# Patient Record
Sex: MALE | Race: WHITE | NOT HISPANIC OR LATINO | Employment: OTHER | URBAN - METROPOLITAN AREA
[De-identification: names, ages, dates, MRNs, and addresses within clinical notes are randomized per-mention and may not be internally consistent; named-entity substitution may affect disease eponyms.]

---

## 2017-01-17 DIAGNOSIS — F41.9 ANXIETY: ICD-10-CM

## 2017-01-17 RX ORDER — ALPRAZOLAM 0.5 MG/1
0.5 TABLET ORAL 3 TIMES DAILY PRN
Qty: 90 TABLET | Refills: 0 | Status: SHIPPED | OUTPATIENT
Start: 2017-01-17 | End: 2017-02-23 | Stop reason: SDUPTHER

## 2017-01-18 DIAGNOSIS — F51.02 TRANSIENT INSOMNIA: ICD-10-CM

## 2017-01-18 NOTE — TELEPHONE ENCOUNTER
Patient notified that his prescription for alprazolam is ready for pickup.  Verbalized understanding but states that he also needs a refill of zolpidem.  Informed that the request would be sent to Dr. Vega and he would be notified when it is ready for pickup.  Verbalized understanding.  LOV 11/9/2016.

## 2017-01-18 NOTE — TELEPHONE ENCOUNTER
----- Message from Ana Elmore sent at 1/18/2017  2:34 PM CST -----  Contact: self  [phone]  Pt is checking on the status on his refill request. Pls call pt [phone]. Thanks......Ortega

## 2017-01-19 RX ORDER — ZOLPIDEM TARTRATE 10 MG/1
10 TABLET ORAL NIGHTLY
Qty: 30 TABLET | Refills: 1 | Status: SHIPPED | OUTPATIENT
Start: 2017-01-19 | End: 2017-03-20 | Stop reason: SDUPTHER

## 2017-02-22 DIAGNOSIS — F41.9 ANXIETY: ICD-10-CM

## 2017-02-22 DIAGNOSIS — I10 ESSENTIAL HYPERTENSION: ICD-10-CM

## 2017-02-22 RX ORDER — METOPROLOL SUCCINATE 25 MG/1
TABLET, EXTENDED RELEASE ORAL
Qty: 90 TABLET | Refills: 1 | Status: SHIPPED | OUTPATIENT
Start: 2017-02-22 | End: 2017-06-18 | Stop reason: SDUPTHER

## 2017-02-22 RX ORDER — AMLODIPINE BESYLATE 10 MG/1
TABLET ORAL
Qty: 90 TABLET | Refills: 1 | Status: SHIPPED | OUTPATIENT
Start: 2017-02-22 | End: 2017-03-20 | Stop reason: SDUPTHER

## 2017-02-22 RX ORDER — LISINOPRIL 40 MG/1
TABLET ORAL
Qty: 90 TABLET | Refills: 1 | Status: SHIPPED | OUTPATIENT
Start: 2017-02-22 | End: 2017-03-20 | Stop reason: SDUPTHER

## 2017-02-23 RX ORDER — ALPRAZOLAM 0.5 MG/1
0.5 TABLET ORAL 3 TIMES DAILY PRN
Qty: 90 TABLET | Refills: 0 | Status: SHIPPED | OUTPATIENT
Start: 2017-02-23 | End: 2017-03-20 | Stop reason: SDUPTHER

## 2017-02-23 NOTE — TELEPHONE ENCOUNTER
----- Message from Marcus Coker sent at 2/23/2017  2:31 PM CST -----  Contact: self  Refill:    alprazolam (XANAX) 0.5 MG tablet    Thanks

## 2017-03-07 ENCOUNTER — TELEPHONE (OUTPATIENT)
Dept: FAMILY MEDICINE | Facility: CLINIC | Age: 62
End: 2017-03-07

## 2017-03-07 NOTE — TELEPHONE ENCOUNTER
----- Message from Fadia Awad sent at 3/6/2017  2:14 PM CST -----  Contact: [phone]  Pt is wanting to know if his refill for alprazolam (XANAX) 0.5 MG tablet is ready for pickup Please call pt at your earliest convenience.  Thanks !

## 2017-03-07 NOTE — TELEPHONE ENCOUNTER
----- Message from Fadia Awad sent at 3/6/2017  2:14 PM CST -----  Contact: [phone]  Pt is wanting to know if his refill for alprazolam (XANAX) 0.5 MG tablet is ready for pickup Please call pt at your earliest convenience.  Thanks !

## 2017-03-08 NOTE — TELEPHONE ENCOUNTER
----- Message from Qamar Landry sent at 3/1/2017  1:27 PM CST -----  Contact: Self  Pt called to check status of refill for alprazolam (XANAX) 0.5 MG tablet. Pt can be reached @ [phone].

## 2017-03-20 DIAGNOSIS — M54.5 CHRONIC MIDLINE LOW BACK PAIN, WITH SCIATICA PRESENCE UNSPECIFIED: ICD-10-CM

## 2017-03-20 DIAGNOSIS — F51.02 TRANSIENT INSOMNIA: ICD-10-CM

## 2017-03-20 DIAGNOSIS — F41.9 ANXIETY: ICD-10-CM

## 2017-03-20 DIAGNOSIS — G89.29 CHRONIC MIDLINE LOW BACK PAIN, WITH SCIATICA PRESENCE UNSPECIFIED: ICD-10-CM

## 2017-03-20 DIAGNOSIS — I10 ESSENTIAL HYPERTENSION: ICD-10-CM

## 2017-03-20 RX ORDER — METOPROLOL SUCCINATE 25 MG/1
25 TABLET, EXTENDED RELEASE ORAL DAILY
Qty: 90 TABLET | Refills: 0 | Status: SHIPPED | OUTPATIENT
Start: 2017-03-20 | End: 2017-05-23 | Stop reason: SDUPTHER

## 2017-03-20 RX ORDER — ALPRAZOLAM 0.5 MG/1
0.5 TABLET ORAL 3 TIMES DAILY PRN
Qty: 90 TABLET | Refills: 0 | Status: SHIPPED | OUTPATIENT
Start: 2017-03-20 | End: 2017-05-23 | Stop reason: SDUPTHER

## 2017-03-20 RX ORDER — NAPROXEN 500 MG/1
500 TABLET ORAL 2 TIMES DAILY WITH MEALS
Qty: 60 TABLET | Refills: 0 | Status: SHIPPED | OUTPATIENT
Start: 2017-03-20 | End: 2017-04-18 | Stop reason: SDUPTHER

## 2017-03-20 RX ORDER — AMLODIPINE BESYLATE 10 MG/1
10 TABLET ORAL DAILY
Qty: 90 TABLET | Refills: 1 | Status: SHIPPED | OUTPATIENT
Start: 2017-03-20 | End: 2017-06-18 | Stop reason: SDUPTHER

## 2017-03-20 RX ORDER — ZOLPIDEM TARTRATE 10 MG/1
10 TABLET ORAL NIGHTLY
Qty: 30 TABLET | Refills: 1 | Status: SHIPPED | OUTPATIENT
Start: 2017-03-20 | End: 2017-05-23 | Stop reason: SDUPTHER

## 2017-03-20 RX ORDER — LISINOPRIL 40 MG/1
40 TABLET ORAL DAILY
Qty: 90 TABLET | Refills: 1 | Status: SHIPPED | OUTPATIENT
Start: 2017-03-20 | End: 2017-06-18 | Stop reason: SDUPTHER

## 2017-03-29 ENCOUNTER — TELEPHONE (OUTPATIENT)
Dept: FAMILY MEDICINE | Facility: CLINIC | Age: 62
End: 2017-03-29

## 2017-03-29 NOTE — TELEPHONE ENCOUNTER
Alvina @ [facility] pharmacy requests refill authorization for patient's alprazolam, metoprolol, and zolpidem prescriptions.  Authorization provided to Edgardo, pharmacist, per prescriptions written by Dr. Vega on 3/20/2017.

## 2017-04-03 NOTE — TELEPHONE ENCOUNTER
Patient inquiring about his metoprolol and zolpidem prescriptions.  Informed that his prescriptions were called in to [facility] pharmacy on 3/29/2017 as stated in message below and advised to contact [facility] for any additional information on shipping status.  Verbalized understanding and states that he will give them several more days to process the order.

## 2017-04-18 DIAGNOSIS — G89.29 CHRONIC MIDLINE LOW BACK PAIN, WITH SCIATICA PRESENCE UNSPECIFIED: ICD-10-CM

## 2017-04-18 DIAGNOSIS — M54.5 CHRONIC MIDLINE LOW BACK PAIN, WITH SCIATICA PRESENCE UNSPECIFIED: ICD-10-CM

## 2017-04-18 RX ORDER — NAPROXEN 500 MG/1
TABLET ORAL
Qty: 60 TABLET | Refills: 0 | Status: SHIPPED | OUTPATIENT
Start: 2017-04-18 | End: 2017-10-30 | Stop reason: SDUPTHER

## 2017-05-13 DIAGNOSIS — F41.9 ANXIETY: ICD-10-CM

## 2017-05-13 DIAGNOSIS — F51.02 TRANSIENT INSOMNIA: ICD-10-CM

## 2017-05-13 NOTE — TELEPHONE ENCOUNTER
----- Message from Fadia Awad sent at 5/13/2017  8:43 AM CDT -----  Contact: [phone]  Pt is requesting refills on alprazolam (XANAX) 0.5 MG tablet and zolpidem (AMBIEN) 10 mg , Tab please send to [facility] - [place], [place] - [address]

## 2017-05-14 RX ORDER — ZOLPIDEM TARTRATE 10 MG/1
10 TABLET ORAL NIGHTLY
Qty: 30 TABLET | Refills: 1 | OUTPATIENT
Start: 2017-05-14

## 2017-05-14 RX ORDER — ALPRAZOLAM 0.5 MG/1
0.5 TABLET ORAL 3 TIMES DAILY PRN
Qty: 90 TABLET | Refills: 0 | OUTPATIENT
Start: 2017-05-14

## 2017-05-23 ENCOUNTER — OFFICE VISIT (OUTPATIENT)
Dept: FAMILY MEDICINE | Facility: CLINIC | Age: 62
End: 2017-05-23
Payer: MEDICARE

## 2017-05-23 VITALS
TEMPERATURE: 98 F | OXYGEN SATURATION: 95 % | BODY MASS INDEX: 36.8 KG/M2 | DIASTOLIC BLOOD PRESSURE: 70 MMHG | WEIGHT: 248.44 LBS | HEIGHT: 69 IN | SYSTOLIC BLOOD PRESSURE: 120 MMHG | HEART RATE: 63 BPM

## 2017-05-23 DIAGNOSIS — G89.29 CHRONIC MIDLINE LOW BACK PAIN WITHOUT SCIATICA: ICD-10-CM

## 2017-05-23 DIAGNOSIS — E66.01 SEVERE OBESITY WITH BODY MASS INDEX 36.0-36.9: ICD-10-CM

## 2017-05-23 DIAGNOSIS — E11.9 CONTROLLED TYPE 2 DIABETES MELLITUS WITHOUT COMPLICATION, WITHOUT LONG-TERM CURRENT USE OF INSULIN: ICD-10-CM

## 2017-05-23 DIAGNOSIS — F51.02 TRANSIENT INSOMNIA: ICD-10-CM

## 2017-05-23 DIAGNOSIS — M54.50 CHRONIC MIDLINE LOW BACK PAIN WITHOUT SCIATICA: ICD-10-CM

## 2017-05-23 DIAGNOSIS — I50.9 CHRONIC CONGESTIVE HEART FAILURE, UNSPECIFIED CONGESTIVE HEART FAILURE TYPE: Primary | ICD-10-CM

## 2017-05-23 DIAGNOSIS — F41.9 ANXIETY: ICD-10-CM

## 2017-05-23 PROCEDURE — 3045F PR MOST RECENT HEMOGLOBIN A1C LEVEL 7.0-9.0%: CPT | Mod: S$GLB,,, | Performed by: FAMILY MEDICINE

## 2017-05-23 PROCEDURE — 4010F ACE/ARB THERAPY RXD/TAKEN: CPT | Mod: S$GLB,,, | Performed by: FAMILY MEDICINE

## 2017-05-23 PROCEDURE — 3074F SYST BP LT 130 MM HG: CPT | Mod: S$GLB,,, | Performed by: FAMILY MEDICINE

## 2017-05-23 PROCEDURE — 99999 PR PBB SHADOW E&M-EST. PATIENT-LVL III: CPT | Mod: PBBFAC,,, | Performed by: FAMILY MEDICINE

## 2017-05-23 PROCEDURE — 1160F RVW MEDS BY RX/DR IN RCRD: CPT | Mod: S$GLB,,, | Performed by: FAMILY MEDICINE

## 2017-05-23 PROCEDURE — 3078F DIAST BP <80 MM HG: CPT | Mod: S$GLB,,, | Performed by: FAMILY MEDICINE

## 2017-05-23 PROCEDURE — 99214 OFFICE O/P EST MOD 30 MIN: CPT | Mod: S$GLB,,, | Performed by: FAMILY MEDICINE

## 2017-05-23 PROCEDURE — 99499 UNLISTED E&M SERVICE: CPT | Mod: HCNC,S$GLB,, | Performed by: FAMILY MEDICINE

## 2017-05-23 RX ORDER — ZOLPIDEM TARTRATE 10 MG/1
10 TABLET ORAL NIGHTLY
Qty: 30 TABLET | Refills: 2 | Status: SHIPPED | OUTPATIENT
Start: 2017-05-23 | End: 2017-08-30 | Stop reason: SDUPTHER

## 2017-05-23 RX ORDER — ALPRAZOLAM 0.5 MG/1
0.5 TABLET ORAL 3 TIMES DAILY PRN
Qty: 90 TABLET | Refills: 0 | Status: SHIPPED | OUTPATIENT
Start: 2017-05-23 | End: 2017-05-23 | Stop reason: SDUPTHER

## 2017-05-23 RX ORDER — ALPRAZOLAM 0.5 MG/1
0.5 TABLET ORAL 4 TIMES DAILY PRN
Qty: 120 TABLET | Refills: 0 | Status: SHIPPED | OUTPATIENT
Start: 2017-05-23 | End: 2017-06-18 | Stop reason: SDUPTHER

## 2017-05-23 NOTE — PROGRESS NOTES
Office Visit    Patient Name: Eyad Rodriguez    : 1955  MRN: [id number]    Subjective:  Eyad is a 62 y.o. male who presents today for:    Medication Refill      This patient has multiple medical diagnoses as noted below.  This patient is known to me and to this clinic. He presents to clinic for refills on his medication.  Patient denies any new symptoms including chest pain, SOB, blurry vision, N/V, diarrhea.  He would like a refill on his medications.  He states that he is doing well.  His last hemoglobin A1C shows that he is well controlled without medications.          Active Problem List  Patient Active Problem List   Diagnosis    BPH (benign prostatic hypertrophy)    DJD (degenerative joint disease) of knee    Insomnia    Anxiety    Chronic low back pain    Severe obesity with body mass index 36.0-36.9    Tobacco abuse    Essential hypertension    Angina at rest    Chest pain, atypical    CHF (congestive heart failure)    Cardiomyopathy    Controlled type 2 diabetes mellitus without complication, without long-term current use of insulin       Past Surgical History  Past Surgical History:   Procedure Laterality Date    cyst removed from back         Family History  Family History   Problem Relation Age of Onset    Hypertension Mother     Anxiety disorder Mother     Diverticulosis Mother     Irritable bowel syndrome Mother     Hypertension Father     Anxiety disorder Father     Anxiety disorder Sister     Colon polyps Brother     Colon cancer Neg Hx     Cirrhosis Neg Hx     Liver cancer Neg Hx     Celiac disease Neg Hx     Crohn's disease Neg Hx     Ulcerative colitis Neg Hx     Esophageal cancer Neg Hx     Stomach cancer Neg Hx     Rectal cancer Neg Hx        Social History  Social History     Social History    Marital status:      Spouse name: N/A    Number of children: 1    Years of education: N/A     Occupational History          Social History  "Main Topics    Smoking status: Former Smoker     Types: Cigars    Smokeless tobacco: Not on file      Comment: quit 10 yrs ago    Alcohol use No    Drug use: No    Sexual activity: Not Currently     Other Topics Concern    Not on file     Social History Narrative    No narrative on file       Current Medications  Medications reviewed and updated.     Allergies   Review of patient's allergies indicates:   Allergen Reactions    Flexeril  [cyclobenzaprine]      Other reaction(s): Nausea       Review of Systems (Pertinent positives)  Review of Systems   Constitutional: Negative for activity change, appetite change, fatigue, fever and unexpected weight change.   HENT: Negative for facial swelling.    Eyes: Negative for visual disturbance.   Respiratory: Negative for chest tightness, shortness of breath, wheezing and stridor.    Cardiovascular: Negative for chest pain, palpitations and leg swelling.   Gastrointestinal: Negative for abdominal distention, abdominal pain, blood in stool, constipation, diarrhea, nausea and vomiting.   Endocrine: Negative for cold intolerance, heat intolerance, polydipsia and polyuria.   Genitourinary: Negative.  Negative for testicular pain and urgency.   Musculoskeletal: Positive for back pain and gait problem.   Skin: Negative.    Allergic/Immunologic: Negative.    Neurological: Negative for dizziness, weakness, light-headedness, numbness and headaches.   Psychiatric/Behavioral: Positive for agitation and sleep disturbance. Negative for decreased concentration. The patient is nervous/anxious.        /70 (BP Location: Left arm, Patient Position: Sitting, BP Method: Manual)   Pulse 63   Temp 98.1 °F (36.7 °C) (Oral)   Ht 5' 9" (1.753 m)   Wt 112.7 kg (248 lb 7.3 oz)   SpO2 95%   BMI 36.69 kg/m²     Physical Exam   Constitutional: He is oriented to person, place, and time. He appears well-developed and well-nourished.   HENT:   Head: Normocephalic and atraumatic.   Eyes: " Conjunctivae and EOM are normal. Pupils are equal, round, and reactive to light.   Neck: Normal range of motion. Neck supple. No JVD present. No thyromegaly present.   Cardiovascular: Normal rate, regular rhythm and normal heart sounds.    Pulmonary/Chest: Effort normal and breath sounds normal.   Abdominal: Soft. Bowel sounds are normal. There is no guarding.   Diastasis recti   Musculoskeletal: Normal range of motion.   Lymphadenopathy:     He has no cervical adenopathy.   Neurological: He is alert and oriented to person, place, and time.   Skin: Skin is warm and dry.   Psychiatric: He has a normal mood and affect. His behavior is normal.       Health Maintenance  Health Maintenance Topics with due status: Not Due       Topic Last Completion Date    Pneumococcal PPSV23 (Medium Risk) 10/08/2015    Lipid Panel 11/09/2016    Influenza Vaccine Not Due       Assessment/Plan:  Eyad Rodriguez is a 62 y.o. male who presents today for :    Eyad was seen today for medication refill.    Diagnoses and all orders for this visit:    Chronic congestive heart failure, unspecified congestive heart failure type  -  No increase in weight   -  Pt is currently stable on medication regimen.  Continue current therapy as scheduled.  Contact office with any questions about adjustments on medications.   -  Follow up with cardiology   Anxiety  -     Discontinue: alprazolam (XANAX) 0.5 MG tablet; Take 1 tablet (0.5 mg total) by mouth 3 (three) times daily as needed for Anxiety.  -     alprazolam (XANAX) 0.5 MG tablet; Take 1 tablet (0.5 mg total) by mouth 4 (four) times daily as needed for Anxiety.  -  Padmini roldan in hospice temporary increase in alprazolam because of increase stress related to caring for her.     Transient insomnia  -     zolpidem (AMBIEN) 10 mg Tab; Take 1 tablet (10 mg total) by mouth nightly.  -  Pt is currently stable on medication regimen.  Continue current therapy as scheduled.  Contact office with any questions  about adjustments on medications.   -  We discussed sleep hygiene including going to bed at the same time every night, having a bedtime routine, avoiding bright lights in the 2-3 hours before bedtime, making sure the bedroom is completely dark when going to bed and not watching TV or reading in bed (preferably no tv in the bedroom), Consider consultation with a sleep specialist if symptoms worsen or persist.    Controlled type 2 diabetes mellitus without complication, without long-term current use of insulin  -   Patient is stable.  Assess and addressed all modifiable risk factors.  Continue with appropriate management to prevent complications.      Chronic midline low back pain without sciatica  -  Continue with pain management     Severe obesity with body mass index 36.0-36.9  -  The patient is asked to make an attempt to improve diet and exercise patterns to aid in medical management of this problem.          No Follow-up on file.

## 2017-05-26 DIAGNOSIS — E11.9 TYPE 2 DIABETES MELLITUS WITHOUT COMPLICATION: ICD-10-CM

## 2017-06-18 DIAGNOSIS — I10 ESSENTIAL HYPERTENSION: ICD-10-CM

## 2017-06-18 DIAGNOSIS — F41.9 ANXIETY: ICD-10-CM

## 2017-06-18 NOTE — TELEPHONE ENCOUNTER
----- Message from Elodia Oden sent at 6/16/2017  4:19 PM CDT -----  Contact: Self   Patient need a refill. Please call patient at [phone]    alprazolam (XANAX) 0.5 MG tablet  lisinopril (PRINIVIL,ZESTRIL) 40 MG tablet  metoprolol succinate (TOPROL-XL) 25 MG 24 hr tablet  amlodipine (NORVASC) 10 MG tablet

## 2017-06-19 RX ORDER — ALPRAZOLAM 0.5 MG/1
0.5 TABLET ORAL 4 TIMES DAILY PRN
Qty: 120 TABLET | Refills: 0 | Status: SHIPPED | OUTPATIENT
Start: 2017-06-19 | End: 2017-07-22 | Stop reason: SDUPTHER

## 2017-06-19 RX ORDER — METOPROLOL SUCCINATE 25 MG/1
25 TABLET, EXTENDED RELEASE ORAL DAILY
Qty: 90 TABLET | Refills: 1 | Status: SHIPPED | OUTPATIENT
Start: 2017-06-19 | End: 2018-01-15 | Stop reason: SDUPTHER

## 2017-06-19 RX ORDER — LISINOPRIL 40 MG/1
40 TABLET ORAL DAILY
Qty: 90 TABLET | Refills: 1 | Status: SHIPPED | OUTPATIENT
Start: 2017-06-19 | End: 2018-01-15 | Stop reason: SDUPTHER

## 2017-06-19 RX ORDER — AMLODIPINE BESYLATE 10 MG/1
10 TABLET ORAL DAILY
Qty: 90 TABLET | Refills: 1 | Status: SHIPPED | OUTPATIENT
Start: 2017-06-19 | End: 2018-01-15 | Stop reason: SDUPTHER

## 2017-07-22 DIAGNOSIS — F41.9 ANXIETY: ICD-10-CM

## 2017-07-24 RX ORDER — ALPRAZOLAM 0.5 MG/1
0.5 TABLET ORAL 4 TIMES DAILY PRN
Qty: 90 TABLET | Refills: 0 | Status: SHIPPED | OUTPATIENT
Start: 2017-07-24 | End: 2017-08-30 | Stop reason: SDUPTHER

## 2017-08-30 DIAGNOSIS — F51.02 TRANSIENT INSOMNIA: ICD-10-CM

## 2017-08-30 DIAGNOSIS — F41.9 ANXIETY: ICD-10-CM

## 2017-08-31 RX ORDER — ALPRAZOLAM 0.5 MG/1
0.5 TABLET ORAL 3 TIMES DAILY PRN
Qty: 90 TABLET | Refills: 0 | Status: SHIPPED | OUTPATIENT
Start: 2017-08-31 | End: 2017-10-09 | Stop reason: SDUPTHER

## 2017-08-31 RX ORDER — ZOLPIDEM TARTRATE 10 MG/1
10 TABLET ORAL NIGHTLY
Qty: 30 TABLET | Refills: 2 | Status: SHIPPED | OUTPATIENT
Start: 2017-08-31 | End: 2017-12-15 | Stop reason: SDUPTHER

## 2017-10-09 DIAGNOSIS — F41.9 ANXIETY: ICD-10-CM

## 2017-10-09 DIAGNOSIS — I10 ESSENTIAL HYPERTENSION: ICD-10-CM

## 2017-10-09 RX ORDER — ALPRAZOLAM 0.5 MG/1
0.5 TABLET ORAL 3 TIMES DAILY PRN
Qty: 90 TABLET | Refills: 0 | Status: SHIPPED | OUTPATIENT
Start: 2017-10-09 | End: 2017-11-10 | Stop reason: SDUPTHER

## 2017-10-09 RX ORDER — METOPROLOL SUCCINATE 25 MG/1
25 TABLET, EXTENDED RELEASE ORAL DAILY
Qty: 90 TABLET | Refills: 1 | Status: CANCELLED | OUTPATIENT
Start: 2017-10-09

## 2017-10-30 ENCOUNTER — OFFICE VISIT (OUTPATIENT)
Dept: FAMILY MEDICINE | Facility: CLINIC | Age: 62
End: 2017-10-30
Payer: MEDICARE

## 2017-10-30 VITALS
BODY MASS INDEX: 36.9 KG/M2 | DIASTOLIC BLOOD PRESSURE: 76 MMHG | HEART RATE: 53 BPM | TEMPERATURE: 98 F | HEIGHT: 69 IN | WEIGHT: 249.13 LBS | SYSTOLIC BLOOD PRESSURE: 100 MMHG | OXYGEN SATURATION: 94 %

## 2017-10-30 DIAGNOSIS — G89.29 CHRONIC MIDLINE LOW BACK PAIN, WITH SCIATICA PRESENCE UNSPECIFIED: ICD-10-CM

## 2017-10-30 DIAGNOSIS — M54.5 CHRONIC MIDLINE LOW BACK PAIN, WITH SCIATICA PRESENCE UNSPECIFIED: ICD-10-CM

## 2017-10-30 DIAGNOSIS — M23.50 CHRONIC INSTABILITY OF KNEE, UNSPECIFIED LATERALITY: ICD-10-CM

## 2017-10-30 DIAGNOSIS — N52.1 ERECTILE DYSFUNCTION DUE TO DISEASES CLASSIFIED ELSEWHERE: Primary | ICD-10-CM

## 2017-10-30 PROCEDURE — 99999 PR PBB SHADOW E&M-EST. PATIENT-LVL III: CPT | Mod: PBBFAC,,, | Performed by: FAMILY MEDICINE

## 2017-10-30 PROCEDURE — 99499 UNLISTED E&M SERVICE: CPT | Mod: S$GLB,,, | Performed by: FAMILY MEDICINE

## 2017-10-30 PROCEDURE — 99214 OFFICE O/P EST MOD 30 MIN: CPT | Mod: S$GLB,,, | Performed by: FAMILY MEDICINE

## 2017-10-30 RX ORDER — HYDROCODONE BITARTRATE AND ACETAMINOPHEN 7.5; 325 MG/1; MG/1
1 TABLET ORAL EVERY 6 HOURS PRN
Qty: 30 TABLET | Refills: 0 | Status: SHIPPED | OUTPATIENT
Start: 2017-10-30 | End: 2018-04-10 | Stop reason: SDUPTHER

## 2017-10-30 RX ORDER — NAPROXEN 500 MG/1
500 TABLET ORAL 2 TIMES DAILY WITH MEALS
Qty: 60 TABLET | Refills: 0 | Status: SHIPPED | OUTPATIENT
Start: 2017-10-30 | End: 2018-01-15 | Stop reason: SDUPTHER

## 2017-10-30 NOTE — PROGRESS NOTES
Office Visit    Patient Name: Eyad Rodriguez    : 1955  MRN: [id number]    Subjective:  Eyad is a 62 y.o. male who presents today for:    Diabetes; Hypertension; and Medication Refill      This patient has multiple medical diagnoses as noted below.  This patient is known to me and to this clinic. He reports some abdominal discomfort.  He has it mainly at night.  He will have a bowel moment, but will have some associated cramping.  He reports that he is having some ED.  He has been on cialis and viagra but has not worked in the past.  Hematoma in scrotal region caused permanent ED. He noted that he has increased intense pain at night.  He would like medication to use periodically for the pain.  He has been following the [employer] diet.  He states that he would like to lose weight to help with his hernia in place.       Patient Active Problem List   Diagnosis    BPH (benign prostatic hypertrophy)    DJD (degenerative joint disease) of knee    Insomnia    Anxiety    Chronic low back pain    Severe obesity with body mass index 36.0-36.9    Tobacco abuse    Essential hypertension    Angina at rest    Chest pain, atypical    CHF (congestive heart failure)    Cardiomyopathy    Controlled type 2 diabetes mellitus without complication, without long-term current use of insulin       Past Surgical History:   Procedure Laterality Date    cyst removed from back         Family History   Problem Relation Age of Onset    Hypertension Mother     Anxiety disorder Mother     Diverticulosis Mother     Irritable bowel syndrome Mother     Hypertension Father     Anxiety disorder Father     Anxiety disorder Sister     Colon polyps Brother     Colon cancer Neg Hx     Cirrhosis Neg Hx     Liver cancer Neg Hx     Celiac disease Neg Hx     Crohn's disease Neg Hx     Ulcerative colitis Neg Hx     Esophageal cancer Neg Hx     Stomach cancer Neg Hx     Rectal cancer Neg Hx        Social History     Social  History    Marital status:      Spouse name: N/A    Number of children: 1    Years of education: N/A     Occupational History          Social History Main Topics    Smoking status: Former Smoker     Types: Cigars    Smokeless tobacco: Not on file      Comment: quit 10 yrs ago    Alcohol use No    Drug use: No    Sexual activity: Not Currently     Other Topics Concern    Not on file     Social History Narrative    No narrative on file       Current Medications  Medications reviewed and updated.     Allergies   Review of patient's allergies indicates:   Allergen Reactions    Flexeril  [cyclobenzaprine]      Other reaction(s): Nausea         Labs  Lab Results   Component Value Date    HGBA1C 7.0 (H) 11/09/2016     Lab Results   Component Value Date     08/13/2015    K 4.2 08/13/2015     08/13/2015    CO2 29 08/13/2015    BUN 13 08/13/2015    CREATININE 1.1 08/13/2015    CALCIUM 9.6 08/13/2015    ANIONGAP 9 08/13/2015    ESTGFRAFRICA >60.0 08/13/2015    EGFRNONAA >60.0 08/13/2015     Lab Results   Component Value Date    CHOL 208 (H) 11/09/2016    CHOL 193 04/13/2013     Lab Results   Component Value Date    HDL 37 (L) 11/09/2016    HDL 39 (L) 04/13/2013     Lab Results   Component Value Date    LDLCALC 139.2 11/09/2016    LDLCALC 132.0 04/13/2013     Lab Results   Component Value Date    TRIG 159 (H) 11/09/2016    TRIG 111 04/13/2013     Lab Results   Component Value Date    CHOLHDL 17.8 (L) 11/09/2016    CHOLHDL 20.2 04/13/2013     Last set of blood work has been reviewed as noted above.    Review of Systems   Constitutional: Negative for activity change, appetite change, fatigue, fever and unexpected weight change.   HENT: Negative for facial swelling.    Eyes: Negative for visual disturbance.   Respiratory: Negative for chest tightness, shortness of breath, wheezing and stridor.    Cardiovascular: Negative for chest pain, palpitations and leg swelling.   Gastrointestinal:  "Negative for abdominal distention, abdominal pain, blood in stool, constipation, diarrhea, nausea and vomiting.   Endocrine: Negative for cold intolerance, heat intolerance, polydipsia and polyuria.   Genitourinary: Negative.  Negative for testicular pain and urgency.        ED   Skin: Negative.    Allergic/Immunologic: Negative.    Neurological: Negative for dizziness, weakness, light-headedness, numbness and headaches.   Psychiatric/Behavioral: Negative for agitation and decreased concentration.       /76 (BP Location: Left arm, Patient Position: Sitting, BP Method: X-Large (Manual))   Pulse (!) 53   Temp 97.9 °F (36.6 °C) (Oral)   Ht 5' 9" (1.753 m)   Wt 113 kg (249 lb 1.9 oz)   SpO2 (!) 94%   BMI 36.79 kg/m²      Physical Exam   Constitutional: He is oriented to person, place, and time. He appears well-developed and well-nourished.   HENT:   Head: Normocephalic and atraumatic.   Eyes: Conjunctivae and EOM are normal. Pupils are equal, round, and reactive to light.   Neck: Normal range of motion. Neck supple. No JVD present. No thyromegaly present.   Cardiovascular: Normal rate, regular rhythm and normal heart sounds.    Pulmonary/Chest: Effort normal and breath sounds normal.   Abdominal: Soft. Bowel sounds are normal. He exhibits no distension. There is no tenderness. There is no guarding.   Diastasis recti   Musculoskeletal: Normal range of motion.   Lymphadenopathy:     He has no cervical adenopathy.   Neurological: He is alert and oriented to person, place, and time.   Skin: Skin is warm and dry.   Psychiatric: He has a normal mood and affect. His behavior is normal.       Health Maintenance  Health Maintenance       Date Due Completion Date    Foot Exam 05/18/1967 ---    Eye Exam 05/18/1967 ---    TETANUS VACCINE 05/18/1975 ---    Colonoscopy 05/18/2007 ---    Hemoglobin A1c 05/09/2017 11/9/2016    Zoster Vaccine 05/18/2017 ---    Influenza Vaccine 08/01/2017 ---    Lipid Panel 11/09/2017 " 11/9/2016    Pneumococcal PPSV23 (Medium Risk) (2) 05/18/2022 10/8/2015          Assessment/Plan:  Eyad Rodriguez is a 62 y.o. male who presents today for :    1. Erectile dysfunction due to diseases classified elsewhere    2. Chronic midline low back pain, with sciatica presence unspecified    3. Chronic instability of knee, unspecified laterality        Problem List Items Addressed This Visit        Unprioritized    Chronic low back pain    Overview     followed by pain management         Relevant Medications    naproxen (NAPROSYN) 500 MG tablet    hydrocodone-acetaminophen 7.5-325mg (NORCO) 7.5-325 mg per tablet      Other Visit Diagnoses     Erectile dysfunction due to diseases classified elsewhere    -  Primary    Relevant Medications    alprostadil (MUSE) 125 MCG pellet    Chronic instability of knee, unspecified laterality      -  contiue with pain management             Return in about 6 months (around 4/30/2018).     This note was created by combination of typed  and Dragon dictation.  Transcription errors may be present.  If there are any questions, please contact me.

## 2017-11-10 DIAGNOSIS — F41.9 ANXIETY: ICD-10-CM

## 2017-11-10 RX ORDER — ALPRAZOLAM 0.5 MG/1
0.5 TABLET ORAL 3 TIMES DAILY PRN
Qty: 15 TABLET | Refills: 0 | Status: SHIPPED | OUTPATIENT
Start: 2017-11-10 | End: 2017-11-13 | Stop reason: SDUPTHER

## 2017-11-10 NOTE — PROGRESS NOTES
Pt came into office Friday afternoon 11/10 at 3:40pm asking for refill on his xanax. He realized he was out of medication today.   RX last filled on 10/10/2017. Reviewed [place]   I will prescribe enough until Dr. Vega comes back to office.

## 2017-11-13 DIAGNOSIS — F41.9 ANXIETY: ICD-10-CM

## 2017-11-13 RX ORDER — ALPRAZOLAM 0.5 MG/1
0.5 TABLET ORAL 3 TIMES DAILY PRN
Qty: 90 TABLET | Refills: 0 | Status: SHIPPED | OUTPATIENT
Start: 2017-11-13 | End: 2017-12-15 | Stop reason: SDUPTHER

## 2017-11-13 NOTE — TELEPHONE ENCOUNTER
Patient came here Friday afternoon needing refill on his Xanax. He said that he was completely out. He was given fifteen of them by Dr Pickens to last until Monday

## 2017-11-17 DIAGNOSIS — E11.9 TYPE 2 DIABETES MELLITUS WITHOUT COMPLICATION: ICD-10-CM

## 2017-12-15 DIAGNOSIS — F41.9 ANXIETY: ICD-10-CM

## 2017-12-15 DIAGNOSIS — F51.02 TRANSIENT INSOMNIA: ICD-10-CM

## 2017-12-15 NOTE — TELEPHONE ENCOUNTER
Patient requesting refill on xanax and ambien.  Last OV 10/30/17  Last rx xanax 11/13/17  Last rx ambien 8/31/17  No upcoming scheduled appointment

## 2017-12-15 NOTE — TELEPHONE ENCOUNTER
----- Message from Verito Pickens sent at 12/15/2017 11:35 AM CST -----  Refill: zolpidem (AMBIEN) 10 mg Tab  Refill: ALPRAZolam (XANAX) 0.5 MG tablet    [facility] [place] - [id number] - [place] - [address]    Patient can be reached at [phone]. Thank you!

## 2017-12-18 ENCOUNTER — TELEPHONE (OUTPATIENT)
Dept: FAMILY MEDICINE | Facility: CLINIC | Age: 62
End: 2017-12-18

## 2017-12-18 RX ORDER — ZOLPIDEM TARTRATE 10 MG/1
10 TABLET ORAL NIGHTLY
Qty: 30 TABLET | Refills: 2 | Status: SHIPPED | OUTPATIENT
Start: 2017-12-18 | End: 2018-04-10 | Stop reason: SDUPTHER

## 2017-12-18 RX ORDER — ALPRAZOLAM 0.5 MG/1
0.5 TABLET ORAL 3 TIMES DAILY PRN
Qty: 90 TABLET | Refills: 0 | Status: SHIPPED | OUTPATIENT
Start: 2017-12-18 | End: 2018-01-15 | Stop reason: SDUPTHER

## 2017-12-18 NOTE — TELEPHONE ENCOUNTER
Advised patient that  has been out of office and that his refill request is still pending for approval. Advised pt to give Dr. Vega sometime to see his request and someone will call him back.

## 2017-12-18 NOTE — TELEPHONE ENCOUNTER
----- Message from Gary Underwood sent at 12/18/2017 12:36 PM CST -----  Contact: self  Refill: ALPRAZolam (XANAX) 0.5 MG tablet. Contact pt at [phone].    Thanks-

## 2017-12-19 DIAGNOSIS — F41.9 ANXIETY: ICD-10-CM

## 2017-12-19 DIAGNOSIS — F51.02 TRANSIENT INSOMNIA: ICD-10-CM

## 2017-12-19 RX ORDER — ZOLPIDEM TARTRATE 10 MG/1
10 TABLET ORAL NIGHTLY
Qty: 30 TABLET | Refills: 2 | OUTPATIENT
Start: 2017-12-19

## 2017-12-19 RX ORDER — ALPRAZOLAM 0.5 MG/1
0.5 TABLET ORAL 3 TIMES DAILY PRN
Qty: 90 TABLET | Refills: 0 | OUTPATIENT
Start: 2017-12-19

## 2018-01-15 DIAGNOSIS — G89.29 CHRONIC MIDLINE LOW BACK PAIN, WITH SCIATICA PRESENCE UNSPECIFIED: ICD-10-CM

## 2018-01-15 DIAGNOSIS — F51.02 TRANSIENT INSOMNIA: ICD-10-CM

## 2018-01-15 DIAGNOSIS — M54.5 CHRONIC MIDLINE LOW BACK PAIN, WITH SCIATICA PRESENCE UNSPECIFIED: ICD-10-CM

## 2018-01-15 DIAGNOSIS — F41.9 ANXIETY: ICD-10-CM

## 2018-01-15 DIAGNOSIS — I10 ESSENTIAL HYPERTENSION: ICD-10-CM

## 2018-01-15 DIAGNOSIS — N52.1 ERECTILE DYSFUNCTION DUE TO DISEASES CLASSIFIED ELSEWHERE: ICD-10-CM

## 2018-01-15 RX ORDER — METOPROLOL SUCCINATE 25 MG/1
25 TABLET, EXTENDED RELEASE ORAL DAILY
Qty: 90 TABLET | Refills: 1 | Status: SHIPPED | OUTPATIENT
Start: 2018-01-15 | End: 2018-08-08 | Stop reason: SDUPTHER

## 2018-01-15 RX ORDER — LISINOPRIL 40 MG/1
40 TABLET ORAL DAILY
Qty: 90 TABLET | Refills: 1 | Status: SHIPPED | OUTPATIENT
Start: 2018-01-15 | End: 2018-08-08 | Stop reason: SDUPTHER

## 2018-01-15 RX ORDER — AMLODIPINE BESYLATE 10 MG/1
10 TABLET ORAL DAILY
Qty: 90 TABLET | Refills: 1 | Status: SHIPPED | OUTPATIENT
Start: 2018-01-15 | End: 2018-08-08 | Stop reason: SDUPTHER

## 2018-01-15 RX ORDER — ALPRAZOLAM 0.5 MG/1
0.5 TABLET ORAL 3 TIMES DAILY PRN
Qty: 90 TABLET | Refills: 0 | Status: CANCELLED | OUTPATIENT
Start: 2018-01-15

## 2018-01-15 RX ORDER — ALPRAZOLAM 0.5 MG/1
0.5 TABLET ORAL 3 TIMES DAILY PRN
Qty: 90 TABLET | Refills: 0 | Status: SHIPPED | OUTPATIENT
Start: 2018-01-15 | End: 2018-02-14 | Stop reason: SDUPTHER

## 2018-01-15 RX ORDER — NAPROXEN 500 MG/1
500 TABLET ORAL 2 TIMES DAILY WITH MEALS
Qty: 60 TABLET | Refills: 0 | Status: SHIPPED | OUTPATIENT
Start: 2018-01-15 | End: 2018-07-10 | Stop reason: SDUPTHER

## 2018-01-15 NOTE — TELEPHONE ENCOUNTER
----- Message from Roberto Ro sent at 1/15/2018 11:47 AM CST -----  Contact: Eyad [phone]  Pt wants to know why his medication was denied. Please give him a call back at your earliest convenience.

## 2018-01-15 NOTE — TELEPHONE ENCOUNTER
----- Message from Gary Underwood sent at 1/15/2018 11:41 AM CST -----  Contact: self  Refill: ALPRAZolam (XANAX) 0.5 MG tablet            zolpidem (AMBIEN) 10 mg Tab    Contact pt once script is ready for  at [phone].    Thanks-

## 2018-01-16 ENCOUNTER — TELEPHONE (OUTPATIENT)
Dept: FAMILY MEDICINE | Facility: CLINIC | Age: 63
End: 2018-01-16

## 2018-01-16 RX ORDER — ZOLPIDEM TARTRATE 10 MG/1
10 TABLET ORAL NIGHTLY
Qty: 30 TABLET | Refills: 2 | OUTPATIENT
Start: 2018-01-16

## 2018-01-16 NOTE — TELEPHONE ENCOUNTER
Patients Rx for Muse 125 mcg is on back order from the   But Muse 250 mcg  Would you like to change it

## 2018-01-26 ENCOUNTER — PES CALL (OUTPATIENT)
Dept: ADMINISTRATIVE | Facility: CLINIC | Age: 63
End: 2018-01-26

## 2018-02-14 DIAGNOSIS — F41.9 ANXIETY: ICD-10-CM

## 2018-02-15 RX ORDER — ALPRAZOLAM 0.5 MG/1
0.5 TABLET ORAL 3 TIMES DAILY PRN
Qty: 90 TABLET | Refills: 0 | Status: SHIPPED | OUTPATIENT
Start: 2018-02-15 | End: 2018-04-10 | Stop reason: SDUPTHER

## 2018-03-12 ENCOUNTER — TELEPHONE (OUTPATIENT)
Dept: FAMILY MEDICINE | Facility: CLINIC | Age: 63
End: 2018-03-12

## 2018-03-12 DIAGNOSIS — F51.02 TRANSIENT INSOMNIA: ICD-10-CM

## 2018-03-12 DIAGNOSIS — F41.9 ANXIETY: ICD-10-CM

## 2018-03-12 RX ORDER — ZOLPIDEM TARTRATE 10 MG/1
10 TABLET ORAL NIGHTLY
Qty: 30 TABLET | Refills: 2 | OUTPATIENT
Start: 2018-03-12

## 2018-03-12 RX ORDER — ALPRAZOLAM 0.5 MG/1
0.5 TABLET ORAL 3 TIMES DAILY PRN
Qty: 90 TABLET | Refills: 0 | OUTPATIENT
Start: 2018-03-12

## 2018-03-12 NOTE — TELEPHONE ENCOUNTER
----- Message from Fadia Awad sent at 3/12/2018 10:30 AM CDT -----  Contact: [phone]  Refill:ALPRAZolam (XANAX) 0.5 MG tablet and zolpidem (AMBIEN) 10 mg Tab

## 2018-03-12 NOTE — TELEPHONE ENCOUNTER
----- Message from Fadia Awad sent at 3/12/2018 10:30 AM CDT -----  Contact: [phone]  Refill:ALPRAZolam (XANAX) 0.5 MG tablet and zolpidem (AMBIEN) 10 mg Tab

## 2018-03-13 ENCOUNTER — TELEPHONE (OUTPATIENT)
Dept: FAMILY MEDICINE | Facility: CLINIC | Age: 63
End: 2018-03-13

## 2018-03-13 NOTE — TELEPHONE ENCOUNTER
Patient has OV scheduled for 4/10/18. Patient states he needs to come in sooner because he will be out of his medication before then. No earlier appointments with Dr. Vega available at this time. Patient placed on wait list for earlier appointment. Patient wants to know why he can't get medication now. Please advise.

## 2018-03-13 NOTE — TELEPHONE ENCOUNTER
----- Message from Roberto Ro sent at 3/13/2018 11:03 AM CDT -----  Contact: Eyad [phone]  Pt wants a call back in regards to refused medication. Please call at your earliest convenience.

## 2018-03-16 DIAGNOSIS — Z12.11 COLON CANCER SCREENING: ICD-10-CM

## 2018-03-16 DIAGNOSIS — Z13.5 DIABETIC RETINOPATHY SCREENING: ICD-10-CM

## 2018-04-10 ENCOUNTER — OFFICE VISIT (OUTPATIENT)
Dept: FAMILY MEDICINE | Facility: CLINIC | Age: 63
End: 2018-04-10
Payer: MEDICARE

## 2018-04-10 VITALS
DIASTOLIC BLOOD PRESSURE: 82 MMHG | BODY MASS INDEX: 37.33 KG/M2 | WEIGHT: 252 LBS | HEART RATE: 58 BPM | TEMPERATURE: 98 F | OXYGEN SATURATION: 95 % | SYSTOLIC BLOOD PRESSURE: 130 MMHG | HEIGHT: 69 IN

## 2018-04-10 DIAGNOSIS — M54.5 CHRONIC MIDLINE LOW BACK PAIN, WITH SCIATICA PRESENCE UNSPECIFIED: ICD-10-CM

## 2018-04-10 DIAGNOSIS — F51.02 TRANSIENT INSOMNIA: ICD-10-CM

## 2018-04-10 DIAGNOSIS — I10 ESSENTIAL HYPERTENSION: Chronic | ICD-10-CM

## 2018-04-10 DIAGNOSIS — E66.01 SEVERE OBESITY WITH BODY MASS INDEX (BMI) OF 35.0 TO 39.9 WITH COMORBIDITY: ICD-10-CM

## 2018-04-10 DIAGNOSIS — E11.9 CONTROLLED TYPE 2 DIABETES MELLITUS WITHOUT COMPLICATION, WITHOUT LONG-TERM CURRENT USE OF INSULIN: Primary | ICD-10-CM

## 2018-04-10 DIAGNOSIS — I20.89 ANGINA AT REST: ICD-10-CM

## 2018-04-10 DIAGNOSIS — F41.9 ANXIETY: ICD-10-CM

## 2018-04-10 DIAGNOSIS — G89.29 CHRONIC MIDLINE LOW BACK PAIN, WITH SCIATICA PRESENCE UNSPECIFIED: ICD-10-CM

## 2018-04-10 DIAGNOSIS — I50.9 CHRONIC CONGESTIVE HEART FAILURE, UNSPECIFIED CONGESTIVE HEART FAILURE TYPE: ICD-10-CM

## 2018-04-10 PROCEDURE — 3079F DIAST BP 80-89 MM HG: CPT | Mod: CPTII,S$GLB,, | Performed by: FAMILY MEDICINE

## 2018-04-10 PROCEDURE — 99214 OFFICE O/P EST MOD 30 MIN: CPT | Mod: S$GLB,,, | Performed by: FAMILY MEDICINE

## 2018-04-10 PROCEDURE — 99499 UNLISTED E&M SERVICE: CPT | Mod: S$GLB,,, | Performed by: FAMILY MEDICINE

## 2018-04-10 PROCEDURE — 99999 PR PBB SHADOW E&M-EST. PATIENT-LVL III: CPT | Mod: PBBFAC,,, | Performed by: FAMILY MEDICINE

## 2018-04-10 PROCEDURE — 3075F SYST BP GE 130 - 139MM HG: CPT | Mod: CPTII,S$GLB,, | Performed by: FAMILY MEDICINE

## 2018-04-10 RX ORDER — ALPRAZOLAM 0.5 MG/1
0.5 TABLET ORAL 3 TIMES DAILY PRN
Qty: 90 TABLET | Refills: 2 | Status: SHIPPED | OUTPATIENT
Start: 2018-04-10 | End: 2018-07-10 | Stop reason: SDUPTHER

## 2018-04-10 RX ORDER — ZOLPIDEM TARTRATE 10 MG/1
10 TABLET ORAL NIGHTLY
Qty: 30 TABLET | Refills: 2 | Status: SHIPPED | OUTPATIENT
Start: 2018-04-10 | End: 2018-07-10 | Stop reason: SDUPTHER

## 2018-04-10 RX ORDER — HYDROCODONE BITARTRATE AND ACETAMINOPHEN 7.5; 325 MG/1; MG/1
1 TABLET ORAL EVERY 6 HOURS PRN
Qty: 30 TABLET | Refills: 0 | Status: SHIPPED | OUTPATIENT
Start: 2018-04-10 | End: 2018-07-10 | Stop reason: SDUPTHER

## 2018-04-10 NOTE — ASSESSMENT & PLAN NOTE
The current medical regimen is effective;  continue present plan and medications.  Patient was counseled that a recent study showed that the chronic use of anxiolytic medication may increase the risk for developing dementia.

## 2018-04-10 NOTE — PROGRESS NOTES
Office Visit    Patient Name: Eyad Rodriguez    : 1955  MRN: [id number]    Subjective:  Eyad is a 62 y.o. male who presents today for:    Medication Refill      This patient has multiple medical diagnoses as noted below.  This patient is known to me and to this clinic. Patient denies any new symptoms including chest pain, SOB, blurry vision, N/V, diarrhea.  He reports that he needs refills.  He denies any new issues.  He reports that his knee and his back hurts.  Nothing has worsened.  He denies any side effects from his medication.  He continues to have issues with PTSD and the lose of his significant other.  He does not like to interact with others.  He denies any SI/HI.  He will try to go out to meet another person when he feels better.       Patient Active Problem List   Diagnosis    BPH (benign prostatic hypertrophy)    DJD (degenerative joint disease) of knee    Insomnia    Anxiety    Chronic low back pain    Severe obesity with body mass index (BMI) of 35.0 to 39.9 with comorbidity    Tobacco abuse    Essential hypertension    Angina at rest    Chest pain, atypical    CHF (congestive heart failure)    Cardiomyopathy    Controlled type 2 diabetes mellitus without complication, without long-term current use of insulin       Past Surgical History:   Procedure Laterality Date    cyst removed from back         Family History   Problem Relation Age of Onset    Hypertension Mother     Anxiety disorder Mother     Diverticulosis Mother     Irritable bowel syndrome Mother     Hypertension Father     Anxiety disorder Father     Anxiety disorder Sister     Colon polyps Brother     Colon cancer Neg Hx     Cirrhosis Neg Hx     Liver cancer Neg Hx     Celiac disease Neg Hx     Crohn's disease Neg Hx     Ulcerative colitis Neg Hx     Esophageal cancer Neg Hx     Stomach cancer Neg Hx     Rectal cancer Neg Hx        Social History     Social History    Marital status:       Spouse name: N/A    Number of children: 1    Years of education: N/A     Occupational History          Social History Main Topics    Smoking status: Former Smoker     Types: Cigars    Smokeless tobacco: Not on file      Comment: quit 10 yrs ago    Alcohol use No    Drug use: No    Sexual activity: Not Currently     Other Topics Concern    Not on file     Social History Narrative    No narrative on file       Current Medications  Medications reviewed and updated.     Allergies   Review of patient's allergies indicates:   Allergen Reactions    Flexeril  [cyclobenzaprine]      Other reaction(s): Nausea         Labs  Lab Results   Component Value Date    HGBA1C 7.0 (H) 11/09/2016     Lab Results   Component Value Date     08/13/2015    K 4.2 08/13/2015     08/13/2015    CO2 29 08/13/2015    BUN 13 08/13/2015    CREATININE 1.1 08/13/2015    CALCIUM 9.6 08/13/2015    ANIONGAP 9 08/13/2015    ESTGFRAFRICA >60.0 08/13/2015    EGFRNONAA >60.0 08/13/2015     Lab Results   Component Value Date    CHOL 208 (H) 11/09/2016    CHOL 193 04/13/2013     Lab Results   Component Value Date    HDL 37 (L) 11/09/2016    HDL 39 (L) 04/13/2013     Lab Results   Component Value Date    LDLCALC 139.2 11/09/2016    LDLCALC 132.0 04/13/2013     Lab Results   Component Value Date    TRIG 159 (H) 11/09/2016    TRIG 111 04/13/2013     Lab Results   Component Value Date    CHOLHDL 17.8 (L) 11/09/2016    CHOLHDL 20.2 04/13/2013     Last set of blood work has been reviewed as noted above.    Review of Systems   Constitutional: Negative for activity change, appetite change, fatigue, fever and unexpected weight change.   HENT: Negative for facial swelling.    Eyes: Negative for visual disturbance.   Respiratory: Negative for chest tightness, shortness of breath, wheezing and stridor.    Cardiovascular: Negative for chest pain, palpitations and leg swelling.   Gastrointestinal: Negative for abdominal distention,  "abdominal pain, blood in stool, constipation, diarrhea, nausea and vomiting.   Endocrine: Negative for cold intolerance, heat intolerance, polydipsia and polyuria.   Genitourinary: Negative.  Negative for testicular pain and urgency.   Musculoskeletal: Positive for back pain and gait problem.   Skin: Negative.    Allergic/Immunologic: Negative.    Neurological: Negative for dizziness, weakness, light-headedness, numbness and headaches.   Psychiatric/Behavioral: Positive for decreased concentration and dysphoric mood. Negative for agitation.       /82 (BP Location: Left arm, Patient Position: Sitting, BP Method: Large (Manual))   Pulse (!) 58   Temp 98 °F (36.7 °C) (Oral)   Ht 5' 9" (1.753 m)   Wt 114.3 kg (251 lb 15.8 oz)   SpO2 95%   BMI 37.21 kg/m²      Physical Exam   Constitutional: He is oriented to person, place, and time. He appears well-developed and well-nourished.   HENT:   Head: Normocephalic and atraumatic.   Right Ear: External ear normal.   Left Ear: External ear normal.   Nose: Nose normal.   Mouth/Throat: Oropharynx is clear and moist.   Eyes: Conjunctivae and EOM are normal. Pupils are equal, round, and reactive to light.   Neck: Normal range of motion.   Cardiovascular: Normal rate, regular rhythm and normal heart sounds.    Pulmonary/Chest: Effort normal and breath sounds normal.   Neurological: He is alert and oriented to person, place, and time.   Skin: Skin is warm and dry.   Psychiatric: His mood appears not anxious. His affect is not inappropriate. He is withdrawn. He exhibits a depressed mood.   Vitals reviewed.      Health Maintenance  Health Maintenance       Date Due Completion Date    Foot Exam 05/18/1967 ---    Eye Exam 05/18/1967 ---    TETANUS VACCINE 05/18/1975 ---    High Dose Statin 05/18/1978 ---    Colonoscopy 05/18/2007 ---    Hemoglobin A1c 05/09/2017 11/9/2016    Zoster Vaccine 05/18/2017 ---    Influenza Vaccine 08/01/2017 ---    Lipid Panel 11/09/2017 11/9/2016 "    Pneumococcal PPSV23 (Medium Risk) (2) 05/18/2022 10/8/2015          Assessment/Plan:  Eyad Rodriguez is a 62 y.o. male who presents today for :    1. Controlled type 2 diabetes mellitus without complication, without long-term current use of insulin    2. Anxiety    3. Transient insomnia    4. Severe obesity with body mass index (BMI) of 35.0 to 39.9 with comorbidity    5. Essential hypertension    6. Angina at rest    7. Chronic congestive heart failure, unspecified congestive heart failure type    8. Chronic midline low back pain, with sciatica presence unspecified        Problem List Items Addressed This Visit        Unprioritized    Angina at rest    Current Assessment & Plan     No recent issues   -  Patient is stable.  Assess and addressed all modifiable risk factors.  Continue with appropriate management to prevent complications.           Anxiety    Current Assessment & Plan     The current medical regimen is effective;  continue present plan and medications.  Patient was counseled that a recent study showed that the chronic use of anxiolytic medication may increase the risk for developing dementia.         Relevant Medications    ALPRAZolam (XANAX) 0.5 MG tablet    CHF (congestive heart failure)    Current Assessment & Plan     Noted          Chronic low back pain    Overview     followed by pain management         Relevant Medications    hydrocodone-acetaminophen 7.5-325mg (NORCO) 7.5-325 mg per tablet    Controlled type 2 diabetes mellitus without complication, without long-term current use of insulin - Primary    Current Assessment & Plan     Pt is currently stable on medication regimen.  Continue current therapy as scheduled.  Contact office with any questions about adjustments on medications.            Relevant Orders    CBC auto differential    Comprehensive metabolic panel    Hemoglobin A1c    TSH    Lipid panel    Essential hypertension (Chronic)    Current Assessment & Plan     Pt is  currently stable on medication regimen.  Continue current therapy as scheduled.  Contact office with any questions about adjustments on medications.            Relevant Orders    CBC auto differential    Comprehensive metabolic panel    Hemoglobin A1c    TSH    Lipid panel    Severe obesity with body mass index (BMI) of 35.0 to 39.9 with comorbidity    Current Assessment & Plan     The patient is asked to make an attempt to improve diet and exercise patterns to aid in medical management of this problem.             Other Visit Diagnoses     Transient insomnia        Relevant Medications    zolpidem (AMBIEN) 10 mg Tab          No Follow-up on file.     This note was created by combination of typed  and Dragon dictation.  Transcription errors may be present.  If there are any questions, please contact me.

## 2018-04-10 NOTE — ASSESSMENT & PLAN NOTE
No recent issues   -  Patient is stable.  Assess and addressed all modifiable risk factors.  Continue with appropriate management to prevent complications.

## 2018-04-20 ENCOUNTER — PES CALL (OUTPATIENT)
Dept: ADMINISTRATIVE | Facility: CLINIC | Age: 63
End: 2018-04-20

## 2018-06-26 ENCOUNTER — TELEPHONE (OUTPATIENT)
Dept: FAMILY MEDICINE | Facility: CLINIC | Age: 63
End: 2018-06-26

## 2018-06-26 NOTE — TELEPHONE ENCOUNTER
----- Message from Azra Shaw sent at 6/26/2018  1:00 PM CDT -----  Contact: self  Pt returned staff's call. In regards to call, pt states he will complete  diabetic eye and foot exam, colonoscopy, or immunizations [facility] so he can try to get his [employer] benefits.

## 2018-06-26 NOTE — TELEPHONE ENCOUNTER
Left message with patient's friend to call our office in regards to his overdue Health Maintenance.    Please find out if the patient had a diabetic eye and foot exam, colonoscopy, or immunizations    Zoster and tetanus can not be ordered secondary to patient's insurance.

## 2018-07-02 ENCOUNTER — OFFICE VISIT (OUTPATIENT)
Dept: FAMILY MEDICINE | Facility: CLINIC | Age: 63
End: 2018-07-02
Payer: MEDICARE

## 2018-07-02 ENCOUNTER — LAB VISIT (OUTPATIENT)
Dept: LAB | Facility: HOSPITAL | Age: 63
End: 2018-07-02
Attending: FAMILY MEDICINE
Payer: MEDICARE

## 2018-07-02 VITALS
OXYGEN SATURATION: 98 % | SYSTOLIC BLOOD PRESSURE: 160 MMHG | HEART RATE: 60 BPM | HEIGHT: 69 IN | DIASTOLIC BLOOD PRESSURE: 80 MMHG | WEIGHT: 252.75 LBS | BODY MASS INDEX: 37.44 KG/M2 | TEMPERATURE: 98 F

## 2018-07-02 DIAGNOSIS — I10 ESSENTIAL HYPERTENSION: Chronic | ICD-10-CM

## 2018-07-02 DIAGNOSIS — F41.9 ANXIETY: ICD-10-CM

## 2018-07-02 DIAGNOSIS — G47.00 INSOMNIA, UNSPECIFIED TYPE: ICD-10-CM

## 2018-07-02 DIAGNOSIS — E11.9 CONTROLLED TYPE 2 DIABETES MELLITUS WITHOUT COMPLICATION, WITHOUT LONG-TERM CURRENT USE OF INSULIN: ICD-10-CM

## 2018-07-02 DIAGNOSIS — I20.89 ANGINA AT REST: ICD-10-CM

## 2018-07-02 DIAGNOSIS — I42.9 CARDIOMYOPATHY, UNSPECIFIED TYPE: ICD-10-CM

## 2018-07-02 DIAGNOSIS — I50.9 CONGESTIVE HEART FAILURE, UNSPECIFIED HF CHRONICITY, UNSPECIFIED HEART FAILURE TYPE: ICD-10-CM

## 2018-07-02 DIAGNOSIS — Z00.00 ENCOUNTER FOR PREVENTIVE HEALTH EXAMINATION: Primary | ICD-10-CM

## 2018-07-02 DIAGNOSIS — E66.01 SEVERE OBESITY WITH BODY MASS INDEX (BMI) OF 35.0 TO 39.9 WITH COMORBIDITY: ICD-10-CM

## 2018-07-02 LAB
ALBUMIN SERPL BCP-MCNC: 3.8 G/DL
ALP SERPL-CCNC: 70 U/L
ALT SERPL W/O P-5'-P-CCNC: 32 U/L
ANION GAP SERPL CALC-SCNC: 9 MMOL/L
AST SERPL-CCNC: 28 U/L
BASOPHILS # BLD AUTO: 0.08 K/UL
BASOPHILS NFR BLD: 1 %
BILIRUB SERPL-MCNC: 0.8 MG/DL
BUN SERPL-MCNC: 13 MG/DL
CALCIUM SERPL-MCNC: 9.4 MG/DL
CHLORIDE SERPL-SCNC: 105 MMOL/L
CHOLEST SERPL-MCNC: 178 MG/DL
CHOLEST/HDLC SERPL: 4.7 {RATIO}
CO2 SERPL-SCNC: 25 MMOL/L
CREAT SERPL-MCNC: 1 MG/DL
DIFFERENTIAL METHOD: NORMAL
EOSINOPHIL # BLD AUTO: 0.2 K/UL
EOSINOPHIL NFR BLD: 3 %
ERYTHROCYTE [DISTWIDTH] IN BLOOD BY AUTOMATED COUNT: 12.8 %
EST. GFR  (AFRICAN AMERICAN): >60 ML/MIN/1.73 M^2
EST. GFR  (NON AFRICAN AMERICAN): >60 ML/MIN/1.73 M^2
ESTIMATED AVG GLUCOSE: 123 MG/DL
GLUCOSE SERPL-MCNC: 118 MG/DL
HBA1C MFR BLD HPLC: 5.9 %
HCT VFR BLD AUTO: 45.1 %
HDLC SERPL-MCNC: 38 MG/DL
HDLC SERPL: 21.3 %
HGB BLD-MCNC: 14.7 G/DL
IMM GRANULOCYTES # BLD AUTO: 0.03 K/UL
IMM GRANULOCYTES NFR BLD AUTO: 0.4 %
LDLC SERPL CALC-MCNC: 89.2 MG/DL
LYMPHOCYTES # BLD AUTO: 2.6 K/UL
LYMPHOCYTES NFR BLD: 33.5 %
MCH RBC QN AUTO: 30.1 PG
MCHC RBC AUTO-ENTMCNC: 32.6 G/DL
MCV RBC AUTO: 92 FL
MONOCYTES # BLD AUTO: 0.7 K/UL
MONOCYTES NFR BLD: 8.4 %
NEUTROPHILS # BLD AUTO: 4.2 K/UL
NEUTROPHILS NFR BLD: 53.7 %
NONHDLC SERPL-MCNC: 140 MG/DL
NRBC BLD-RTO: 0 /100 WBC
PLATELET # BLD AUTO: 264 K/UL
PMV BLD AUTO: 9.8 FL
POTASSIUM SERPL-SCNC: 4.5 MMOL/L
PROT SERPL-MCNC: 7.3 G/DL
RBC # BLD AUTO: 4.89 M/UL
SODIUM SERPL-SCNC: 139 MMOL/L
TRIGL SERPL-MCNC: 254 MG/DL
TSH SERPL DL<=0.005 MIU/L-ACNC: 0.97 UIU/ML
WBC # BLD AUTO: 7.75 K/UL

## 2018-07-02 PROCEDURE — 3079F DIAST BP 80-89 MM HG: CPT | Mod: CPTII,S$GLB,, | Performed by: NURSE PRACTITIONER

## 2018-07-02 PROCEDURE — 36415 COLL VENOUS BLD VENIPUNCTURE: CPT | Mod: PO

## 2018-07-02 PROCEDURE — 83036 HEMOGLOBIN GLYCOSYLATED A1C: CPT

## 2018-07-02 PROCEDURE — 80061 LIPID PANEL: CPT

## 2018-07-02 PROCEDURE — 84443 ASSAY THYROID STIM HORMONE: CPT

## 2018-07-02 PROCEDURE — 3077F SYST BP >= 140 MM HG: CPT | Mod: CPTII,S$GLB,, | Performed by: NURSE PRACTITIONER

## 2018-07-02 PROCEDURE — 80053 COMPREHEN METABOLIC PANEL: CPT

## 2018-07-02 PROCEDURE — G0439 PPPS, SUBSEQ VISIT: HCPCS | Mod: S$GLB,,, | Performed by: NURSE PRACTITIONER

## 2018-07-02 PROCEDURE — 99999 PR PBB SHADOW E&M-EST. PATIENT-LVL IV: CPT | Mod: PBBFAC,,, | Performed by: NURSE PRACTITIONER

## 2018-07-02 PROCEDURE — 85025 COMPLETE CBC W/AUTO DIFF WBC: CPT

## 2018-07-02 NOTE — PATIENT INSTRUCTIONS
Counseling and Referral of Other Preventative  (Italic type indicates deductible and co-insurance are waived)    Patient Name: Eyad Rodriguez  Today's Date: 7/2/2018    Health Maintenance       Date Due Completion Date    Eye Exam 05/18/1967 Patient aware of recommendation for eye exam.     TETANUS VACCINE 05/18/1975 Patient aware of recommendation for tetanus vaccine.     High Dose Statin 05/18/1978 ---    Colonoscopy 05/18/2007 Patient aware of recommendation for colonoscopy.     Zoster Vaccine 05/18/2017 Patient aware of recommendation for zoster vaccine.     Influenza Vaccine 08/01/2018 12/20/2017    Hemoglobin A1c 01/02/2019 7/2/2018 (Done)    Override on 7/2/2018: Done    Foot Exam 07/02/2019 7/2/2018 (Done)    Override on 7/2/2018: Done    Lipid Panel 07/02/2019 7/2/2018 (Done)    Override on 7/2/2018: Done    Pneumococcal PPSV23 (Medium Risk) (2) 05/18/2022 10/8/2015        No orders of the defined types were placed in this encounter.    The following information is provided to all patients.  This information is to help you find resources for any of the problems found today that may be affecting your health:                Living healthy guide: www.[facility].gov      Understanding Diabetes: www.diabetes.org      Eating healthy: www.cdc.gov/healthyweight      CDC home safety checklist: www.cdc.gov/steadi/patient.html      Agency on Aging: www.goea.[place].[facility]      Alcoholics anonymous (AA): www.aa.org      Physical Activity: www.aristeo.nih.gov/gu3itzz      Tobacco use: www.quitwithusla.org

## 2018-07-02 NOTE — PROGRESS NOTES
"Eyad Rodriguez presented for a  Medicare AWV and comprehensive Health Risk Assessment today. The following components were reviewed and updated:    · Medical history  · Family History  · Social history  · Allergies and Current Medications  · Health Risk Assessment  · Health Maintenance  · Care Team     ** See Completed Assessments for Annual Wellness Visit within the encounter summary.**       The following assessments were completed:  · Living Situation  · CAGE  · Depression Screening  · Timed Get Up and Go  · Whisper Test  · Cognitive Function Screening  · Nutrition Screening  · ADL Screening  · PAQ Screening    Vitals:    07/02/18 1108   BP: (!) 160/80   Pulse: 60   Temp: 97.8 °F (36.6 °C)   TempSrc: Oral   SpO2: 98%   Weight: 114.7 kg (252 lb 12.1 oz)   Height: 5' 9" (1.753 m)     Body mass index is 37.33 kg/m².  Physical Exam   Constitutional: He is oriented to person, place, and time.   Cardiovascular: Normal rate, regular rhythm and normal heart sounds.    Pulses:       Dorsalis pedis pulses are 2+ on the right side, and 2+ on the left side.        Posterior tibial pulses are 2+ on the right side, and 2+ on the left side.   Pulmonary/Chest: Effort normal and breath sounds normal.   Musculoskeletal: Normal range of motion. He exhibits edema (bilateral +1 pretibial edema ).        Right foot: There is normal range of motion and no deformity.        Left foot: There is normal range of motion and no deformity.   Feet:   Right Foot:   Protective Sensation: 8 sites tested. 8 sites sensed.   Skin Integrity: Negative for ulcer, blister, skin breakdown, erythema, warmth, callus or dry skin.   Left Foot:   Protective Sensation: 8 sites tested. 8 sites sensed.   Skin Integrity: Negative for ulcer, blister, skin breakdown, erythema, warmth, callus or dry skin.   Neurological: He is alert and oriented to person, place, and time.   Skin: Skin is warm. Capillary refill takes less than 2 seconds.   Bilateral thickened toe " nails to great toes.    Psychiatric: He has a normal mood and affect. His behavior is normal. Thought content normal.   Vitals reviewed.    Foot examination completed using medical monofilament sensory testing screening tool 5.07/10g filament       Diagnoses and health risks identified today and associated recommendations/orders:    1. Encounter for preventive health examination  Education provided about preventive health examinations and procedures; addressed and discussed patient's health concerns. Additionally, reviewed medical record for risk factors and documented the results during this encounter.    2. Congestive heart failure, unspecified HF chronicity, unspecified heart failure type  Stable, we reviewed medications, dietary habits, daily routines, and fluid intake.  Patient deferred cardiology consult at this time stating he is preparing to visit the Department of  Affairs     3. Angina at rest  Stable, we reviewed medications, patient deferred cardiology consult at this time stating he is preparing to visit the [facility] [place] [facility]     4. Cardiomyopathy, unspecified type  Stable, we reviewed medications, patient deferred cardiology consult at this time stating he is preparing to visit the Department of  Affairs     5. Controlled type 2 diabetes mellitus without complication, without long-term current use of insulin  Education provided about diabetes, management of blood glucose with diet and activities, monitoring for worsening effects of diabetes.  Reviewed most recent Ha1c and informed patient of complications associated with uncontrolled diabetes.     6. Severe obesity with body mass index (BMI) of 35.0 to 39.9 with comorbidity  Reminded patient of [employer]'s [employer] benefits with access to fitness centers and classes.   We discussed diet and exercise for weight loss.  Educated about diet, activities, and ways to avoid sedentary lifestyle.   We discussed aquatics, low impact  exercising for stability, stamina, and strength.     7. Anxiety   Denies homicidal or suicidal ideation, we discussed family and social dynamics, stress relieving activities. Continue as advised.     8. Insomnia, unspecified type  Stable, managed with zolpidem. Continue as advised regarding dietary and lifestyle modifications.     Reviewed health maintenance with patient, educated about recommended examinations, procedures (labs & images), and immunizations. [facility] and is now planning to return within the next 3 months.  He would like to defer the recommendations until he confirms reception with the [facility].     Provided Eyad with a 5-10 year written screening schedule and personal prevention plan. Recommendations were developed using the USPSTF age appropriate recommendations. Education, counseling, and referrals were provided as needed. After Visit Summary printed and given to patient which includes a list of additional screenings\tests needed.    Follow-up in about 1 year (around 7/2/2019) for assessment .    Edil Waddell Jr, NP

## 2018-07-10 ENCOUNTER — OFFICE VISIT (OUTPATIENT)
Dept: FAMILY MEDICINE | Facility: CLINIC | Age: 63
End: 2018-07-10
Payer: MEDICARE

## 2018-07-10 VITALS
BODY MASS INDEX: 37.88 KG/M2 | TEMPERATURE: 99 F | DIASTOLIC BLOOD PRESSURE: 82 MMHG | SYSTOLIC BLOOD PRESSURE: 120 MMHG | HEIGHT: 69 IN | HEART RATE: 59 BPM | OXYGEN SATURATION: 94 % | WEIGHT: 255.75 LBS

## 2018-07-10 DIAGNOSIS — F41.9 ANXIETY: ICD-10-CM

## 2018-07-10 DIAGNOSIS — F51.01 PRIMARY INSOMNIA: ICD-10-CM

## 2018-07-10 DIAGNOSIS — I10 ESSENTIAL HYPERTENSION: Primary | Chronic | ICD-10-CM

## 2018-07-10 DIAGNOSIS — M54.5 CHRONIC MIDLINE LOW BACK PAIN, WITH SCIATICA PRESENCE UNSPECIFIED: ICD-10-CM

## 2018-07-10 DIAGNOSIS — F51.02 TRANSIENT INSOMNIA: ICD-10-CM

## 2018-07-10 DIAGNOSIS — G89.29 CHRONIC MIDLINE LOW BACK PAIN, WITH SCIATICA PRESENCE UNSPECIFIED: ICD-10-CM

## 2018-07-10 DIAGNOSIS — E11.9 CONTROLLED TYPE 2 DIABETES MELLITUS WITHOUT COMPLICATION, WITHOUT LONG-TERM CURRENT USE OF INSULIN: ICD-10-CM

## 2018-07-10 PROCEDURE — 3074F SYST BP LT 130 MM HG: CPT | Mod: CPTII,S$GLB,, | Performed by: FAMILY MEDICINE

## 2018-07-10 PROCEDURE — 3044F HG A1C LEVEL LT 7.0%: CPT | Mod: CPTII,S$GLB,, | Performed by: FAMILY MEDICINE

## 2018-07-10 PROCEDURE — 3008F BODY MASS INDEX DOCD: CPT | Mod: CPTII,S$GLB,, | Performed by: FAMILY MEDICINE

## 2018-07-10 PROCEDURE — 99999 PR PBB SHADOW E&M-EST. PATIENT-LVL III: CPT | Mod: PBBFAC,,, | Performed by: FAMILY MEDICINE

## 2018-07-10 PROCEDURE — 3079F DIAST BP 80-89 MM HG: CPT | Mod: CPTII,S$GLB,, | Performed by: FAMILY MEDICINE

## 2018-07-10 PROCEDURE — 99214 OFFICE O/P EST MOD 30 MIN: CPT | Mod: S$GLB,,, | Performed by: FAMILY MEDICINE

## 2018-07-10 RX ORDER — ROSUVASTATIN CALCIUM 5 MG/1
5 TABLET, COATED ORAL DAILY
Qty: 90 TABLET | Refills: 3 | Status: SHIPPED | OUTPATIENT
Start: 2018-07-10 | End: 2019-08-01 | Stop reason: SDUPTHER

## 2018-07-10 RX ORDER — HYDROCODONE BITARTRATE AND ACETAMINOPHEN 7.5; 325 MG/1; MG/1
1 TABLET ORAL EVERY 6 HOURS PRN
Qty: 30 TABLET | Refills: 0 | Status: SHIPPED | OUTPATIENT
Start: 2018-07-10 | End: 2019-01-04 | Stop reason: SDUPTHER

## 2018-07-10 RX ORDER — ALPRAZOLAM 0.5 MG/1
0.5 TABLET ORAL 3 TIMES DAILY PRN
Qty: 90 TABLET | Refills: 2 | Status: SHIPPED | OUTPATIENT
Start: 2018-07-10 | End: 2018-10-05 | Stop reason: SDUPTHER

## 2018-07-10 RX ORDER — NAPROXEN 500 MG/1
500 TABLET ORAL 2 TIMES DAILY WITH MEALS
Qty: 60 TABLET | Refills: 0 | Status: SHIPPED | OUTPATIENT
Start: 2018-07-10 | End: 2019-06-27 | Stop reason: SDUPTHER

## 2018-07-10 RX ORDER — ZOLPIDEM TARTRATE 10 MG/1
10 TABLET ORAL NIGHTLY
Qty: 30 TABLET | Refills: 2 | Status: SHIPPED | OUTPATIENT
Start: 2018-07-10 | End: 2018-08-08 | Stop reason: SDUPTHER

## 2018-07-10 NOTE — PROGRESS NOTES
Assessment & Plan  Problem List Items Addressed This Visit        Unprioritized    Anxiety    Relevant Medications    ALPRAZolam (XANAX) 0.5 MG tablet    Chronic low back pain    Overview     followed by pain management         Current Assessment & Plan     Patient is stable.  Assess and addressed all modifiable risk factors.  Continue with appropriate management to prevent complications.           Relevant Medications    HYDROcodone-acetaminophen (NORCO) 7.5-325 mg per tablet    naproxen (NAPROSYN) 500 MG tablet    Controlled type 2 diabetes mellitus without complication, without long-term current use of insulin    Current Assessment & Plan     Pt is currently stable on medication regimen.  Continue current therapy as scheduled.  Contact office with any questions about adjustments on medications.            Relevant Medications    rosuvastatin (CRESTOR) 5 MG tablet    Essential hypertension - Primary (Chronic)    Current Assessment & Plan     Pt is currently stable on medication regimen.  Continue current therapy as scheduled.  Contact office with any questions about adjustments on medications.            Relevant Medications    rosuvastatin (CRESTOR) 5 MG tablet    Insomnia    Current Assessment & Plan     Pt is currently stable on medication regimen.  Continue current therapy as scheduled.  Contact office with any questions about adjustments on medications.   -   The current medical regimen is effective;  continue present plan and medications.  Patient was counseled that a recent study showed that the chronic use of anxiolytic medication may increase the risk for developing dementia.         Relevant Medications    zolpidem (AMBIEN) 10 mg Tab      Other Visit Diagnoses     Transient insomnia        Relevant Medications    zolpidem (AMBIEN) 10 mg Tab            Health Maintenance reviewed  Follow-up: Follow-up in about 3 months (around  10/10/2018).    ______________________________________________________________________    Chief Complaint  Chief Complaint   Patient presents with    Diabetes    Medication Refill       HPI  Eyad Rodriguez is a 63 y.o. male with multiple medical diagnoses as listed in the medical history and problem list that presents for diabetes.  Pt is known to me with last appointment 4/10/2018.    Patient denies any new symptoms including chest pain, SOB, blurry vision, N/V, diarrhea.  He reports that he is taking his medication on a regular basis.  He is doing well with his diabetic.    The 10-year ASCVD risk score (Tamika GUZMAN Jr., et al., 2013) is: 19.5%    Values used to calculate the score:      Age: 63 years      Sex: Male      Is Non- : No      Diabetic: Yes      Tobacco smoker: No      Systolic Blood Pressure: 120 mmHg      Is BP treated: Yes      HDL Cholesterol: 38 mg/dL      Total Cholesterol: 178 mg/dL      PAST MEDICAL HISTORY:  Past Medical History:   Diagnosis Date    Angina pectoris     Anxiety     BPH (benign prostatic hypertrophy)     Chronic low back pain     followed by pain management    Disorder of kidney and ureter     DJD (degenerative joint disease) of knee     Heart failure     Hemorrhoid     HTN (hypertension)     Insomnia     Obesity     Personal history of kidney stones     Tobacco abuse     Type 2 diabetes mellitus        PAST SURGICAL HISTORY:  Past Surgical History:   Procedure Laterality Date    cyst removed from back         SOCIAL HISTORY:  Social History     Social History    Marital status:      Spouse name: N/A    Number of children: 1    Years of education: N/A     Occupational History          Social History Main Topics    Smoking status: Former Smoker     Types: Cigars    Smokeless tobacco: Current User      Comment: quit 10 yrs ago    Alcohol use No    Drug use: No    Sexual activity: Not Currently     Other Topics  Concern    Not on file     Social History Narrative    No narrative on file       FAMILY HISTORY:  Family History   Problem Relation Age of Onset    Hypertension Mother     Anxiety disorder Mother     Diverticulosis Mother     Irritable bowel syndrome Mother     Hypertension Father     Anxiety disorder Father     Anxiety disorder Sister     Colon polyps Brother     Colon cancer Neg Hx     Cirrhosis Neg Hx     Liver cancer Neg Hx     Celiac disease Neg Hx     Crohn's disease Neg Hx     Ulcerative colitis Neg Hx     Esophageal cancer Neg Hx     Stomach cancer Neg Hx     Rectal cancer Neg Hx        ALLERGIES AND MEDICATIONS: updated and reviewed.  Review of patient's allergies indicates:   Allergen Reactions    Flexeril  [cyclobenzaprine]      Other reaction(s): Nausea     Current Outpatient Prescriptions   Medication Sig Dispense Refill    ALPRAZolam (XANAX) 0.5 MG tablet Take 1 tablet (0.5 mg total) by mouth 3 (three) times daily as needed for Anxiety. 90 tablet 2    alprostadil (MUSE) 250 MCG pellet 1 each (250 mcg total) by Transurethral route as needed for Erectile Dysfunction. use no more than 3 times per week 6 each 1    amLODIPine (NORVASC) 10 MG tablet Take 1 tablet (10 mg total) by mouth once daily. 90 tablet 1    HYDROcodone-acetaminophen (NORCO) 7.5-325 mg per tablet Take 1 tablet by mouth every 6 (six) hours as needed. 30 tablet 0    lisinopril (PRINIVIL,ZESTRIL) 40 MG tablet Take 1 tablet (40 mg total) by mouth once daily. 90 tablet 1    metoprolol succinate (TOPROL-XL) 25 MG 24 hr tablet Take 1 tablet (25 mg total) by mouth once daily. 90 tablet 1    naproxen (NAPROSYN) 500 MG tablet Take 1 tablet (500 mg total) by mouth 2 (two) times daily with meals. 60 tablet 0    zolpidem (AMBIEN) 10 mg Tab Take 1 tablet (10 mg total) by mouth nightly. 30 tablet 2    rosuvastatin (CRESTOR) 5 MG tablet Take 1 tablet (5 mg total) by mouth once daily. 90 tablet 3     No current  "facility-administered medications for this visit.          ROS  Review of Systems   Constitutional: Negative for activity change, appetite change, fatigue, fever and unexpected weight change.   HENT: Negative for facial swelling.    Eyes: Negative for visual disturbance.   Respiratory: Negative for chest tightness, shortness of breath, wheezing and stridor.    Cardiovascular: Negative for chest pain, palpitations and leg swelling.   Gastrointestinal: Negative for abdominal distention, abdominal pain, blood in stool, constipation, diarrhea, nausea and vomiting.   Endocrine: Negative for cold intolerance, heat intolerance, polydipsia and polyuria.   Genitourinary: Negative.  Negative for testicular pain and urgency.   Skin: Negative.    Allergic/Immunologic: Negative.    Neurological: Negative for dizziness, weakness, light-headedness, numbness and headaches.   Psychiatric/Behavioral: Negative for agitation and decreased concentration.       Physical Exam  Vitals:    07/10/18 1320   BP: 120/82   BP Location: Left arm   Patient Position: Sitting   BP Method: Large (Manual)   Pulse: (!) 59   Temp: 98.8 °F (37.1 °C)   TempSrc: Oral   SpO2: (!) 94%   Weight: 116 kg (255 lb 11.7 oz)   Height: 5' 9" (1.753 m)    Body mass index is 37.77 kg/m².  Weight: 116 kg (255 lb 11.7 oz)   Height: 5' 9" (175.3 cm)   Physical Exam   Constitutional: He is oriented to person, place, and time. He appears well-developed and well-nourished.   HENT:   Head: Normocephalic and atraumatic.   Right Ear: External ear normal.   Left Ear: External ear normal.   Nose: Nose normal.   Mouth/Throat: Oropharynx is clear and moist. No oropharyngeal exudate.   Eyes: Conjunctivae and EOM are normal. Pupils are equal, round, and reactive to light.   Neck: Normal range of motion. Neck supple.   Cardiovascular: Normal rate, regular rhythm, normal heart sounds and intact distal pulses.  Exam reveals no gallop and no friction rub.    No murmur " heard.  Pulmonary/Chest: Effort normal and breath sounds normal.   Abdominal: Soft. Bowel sounds are normal.   Musculoskeletal: Normal range of motion.   Neurological: He is alert and oriented to person, place, and time. He has normal reflexes.   Skin: Skin is warm and dry.   Psychiatric: He has a normal mood and affect. His behavior is normal. Thought content normal.   Vitals reviewed.        Health Maintenance       Date Due Completion Date    Eye Exam 05/18/1967 ---    TETANUS VACCINE 05/18/1975 ---    High Dose Statin 05/18/1978 ---    Colonoscopy 05/18/2007 ---    Zoster Vaccine 05/18/2017 ---    Influenza Vaccine 08/01/2018 12/20/2017    Hemoglobin A1c 01/02/2019 7/2/2018    Override on 7/2/2018: Done    Foot Exam 07/02/2019 7/2/2018    Override on 7/2/2018: Done    Lipid Panel 07/02/2019 7/2/2018    Override on 7/2/2018: Done    Pneumococcal PPSV23 (Medium Risk) (2) 05/18/2022 10/8/2015

## 2018-07-10 NOTE — ASSESSMENT & PLAN NOTE
Pt is currently stable on medication regimen.  Continue current therapy as scheduled.  Contact office with any questions about adjustments on medications.   -   The current medical regimen is effective;  continue present plan and medications.  Patient was counseled that a recent study showed that the chronic use of anxiolytic medication may increase the risk for developing dementia.

## 2018-08-08 DIAGNOSIS — F51.02 TRANSIENT INSOMNIA: ICD-10-CM

## 2018-08-08 DIAGNOSIS — I10 ESSENTIAL HYPERTENSION: ICD-10-CM

## 2018-08-08 RX ORDER — AMLODIPINE BESYLATE 10 MG/1
10 TABLET ORAL DAILY
Qty: 90 TABLET | Refills: 1 | Status: SHIPPED | OUTPATIENT
Start: 2018-08-08 | End: 2019-02-01 | Stop reason: SDUPTHER

## 2018-08-08 RX ORDER — LISINOPRIL 40 MG/1
40 TABLET ORAL DAILY
Qty: 90 TABLET | Refills: 1 | Status: SHIPPED | OUTPATIENT
Start: 2018-08-08 | End: 2019-02-01 | Stop reason: SDUPTHER

## 2018-08-08 RX ORDER — ZOLPIDEM TARTRATE 10 MG/1
10 TABLET ORAL NIGHTLY
Qty: 30 TABLET | Refills: 2 | Status: SHIPPED | OUTPATIENT
Start: 2018-08-08 | End: 2018-10-05 | Stop reason: SDUPTHER

## 2018-08-08 RX ORDER — METOPROLOL SUCCINATE 25 MG/1
25 TABLET, EXTENDED RELEASE ORAL DAILY
Qty: 90 TABLET | Refills: 1 | Status: SHIPPED | OUTPATIENT
Start: 2018-08-08 | End: 2019-02-01 | Stop reason: SDUPTHER

## 2018-08-08 NOTE — TELEPHONE ENCOUNTER
----- Message from Sis Adan sent at 8/8/2018 12:20 PM CDT -----  Contact: self  lisinopril (PRINIVIL,ZESTRIL) 40 MG tablet  amLODIPine (NORVASC) 10 MG tablet  zolpidem (AMBIEN) 10 mg Tab  metoprolol succinate (TOPROL-XL) 25 MG 24 hr tablet    Pt calling to request a refill of the above to be sent to [facility]. Please call pt to [phone].

## 2018-10-05 ENCOUNTER — OFFICE VISIT (OUTPATIENT)
Dept: FAMILY MEDICINE | Facility: CLINIC | Age: 63
End: 2018-10-05
Payer: MEDICARE

## 2018-10-05 VITALS
DIASTOLIC BLOOD PRESSURE: 72 MMHG | HEART RATE: 63 BPM | HEIGHT: 69 IN | BODY MASS INDEX: 39.15 KG/M2 | OXYGEN SATURATION: 94 % | WEIGHT: 264.31 LBS | SYSTOLIC BLOOD PRESSURE: 114 MMHG | TEMPERATURE: 98 F

## 2018-10-05 DIAGNOSIS — F41.9 ANXIETY: ICD-10-CM

## 2018-10-05 DIAGNOSIS — F51.02 TRANSIENT INSOMNIA: ICD-10-CM

## 2018-10-05 PROCEDURE — 99999 PR PBB SHADOW E&M-EST. PATIENT-LVL III: CPT | Mod: PBBFAC,,, | Performed by: FAMILY MEDICINE

## 2018-10-05 PROCEDURE — 3008F BODY MASS INDEX DOCD: CPT | Mod: CPTII,,, | Performed by: FAMILY MEDICINE

## 2018-10-05 PROCEDURE — 99213 OFFICE O/P EST LOW 20 MIN: CPT | Mod: PBBFAC,PO | Performed by: FAMILY MEDICINE

## 2018-10-05 PROCEDURE — 3074F SYST BP LT 130 MM HG: CPT | Mod: CPTII,,, | Performed by: FAMILY MEDICINE

## 2018-10-05 PROCEDURE — 99214 OFFICE O/P EST MOD 30 MIN: CPT | Mod: S$PBB,,, | Performed by: FAMILY MEDICINE

## 2018-10-05 PROCEDURE — 3078F DIAST BP <80 MM HG: CPT | Mod: CPTII,,, | Performed by: FAMILY MEDICINE

## 2018-10-05 RX ORDER — ZOLPIDEM TARTRATE 10 MG/1
10 TABLET ORAL NIGHTLY
Qty: 30 TABLET | Refills: 2 | Status: SHIPPED | OUTPATIENT
Start: 2018-10-05 | End: 2018-10-05 | Stop reason: SDUPTHER

## 2018-10-05 RX ORDER — ZOLPIDEM TARTRATE 10 MG/1
10 TABLET ORAL NIGHTLY
Qty: 30 TABLET | Refills: 2 | Status: SHIPPED | OUTPATIENT
Start: 2018-10-05 | End: 2019-01-04 | Stop reason: SDUPTHER

## 2018-10-05 RX ORDER — ALPRAZOLAM 0.5 MG/1
0.5 TABLET ORAL 3 TIMES DAILY PRN
Qty: 90 TABLET | Refills: 2 | Status: SHIPPED | OUTPATIENT
Start: 2018-10-05 | End: 2018-10-05 | Stop reason: SDUPTHER

## 2018-10-05 RX ORDER — ALPRAZOLAM 0.5 MG/1
0.5 TABLET ORAL 3 TIMES DAILY PRN
Qty: 90 TABLET | Refills: 2 | Status: SHIPPED | OUTPATIENT
Start: 2018-10-05 | End: 2019-01-04 | Stop reason: SDUPTHER

## 2018-10-05 NOTE — PROGRESS NOTES
Assessment & Plan  Problem List Items Addressed This Visit        Unprioritized    Anxiety    Relevant Medications    ALPRAZolam (XANAX) 0.5 MG tablet      Other Visit Diagnoses     Transient insomnia        Relevant Medications    zolpidem (AMBIEN) 10 mg Tab            Health Maintenance reviewed    Follow-up: Follow-up in about 3 months (around 1/5/2019).    ______________________________________________________________________    Chief Complaint  No chief complaint on file.      HPI  Eyad Rodriguez is a 63 y.o. male with multiple medical diagnoses as listed in the medical history and problem list that presents for medication refills.  Pt is known to me with last appointment 7/10/2018.      Patient denies any new symptoms including chest pain, SOB, blurry vision, N/V, diarrhea.  He requires refills on his medication.  He will be traveling very soon.  No recent changes in symptoms.     PAST MEDICAL HISTORY:  Past Medical History:   Diagnosis Date    Angina pectoris     Anxiety     BPH (benign prostatic hypertrophy)     Chronic low back pain     followed by pain management    Disorder of kidney and ureter     DJD (degenerative joint disease) of knee     Heart failure     Hemorrhoid     HTN (hypertension)     Insomnia     Obesity     Personal history of kidney stones     Tobacco abuse     Type 2 diabetes mellitus        PAST SURGICAL HISTORY:  Past Surgical History:   Procedure Laterality Date    COLONOSCOPY N/A 7/10/2015    Performed by Chuck [facility] at [facility] ([facility])    cyst removed from back      ESOPHAGOGASTRODUODENOSCOPY (EGD) N/A 7/10/2015    Performed by Chuck Delacruz MD at [facility] (4TH FLR)       SOCIAL HISTORY:  Social History     Socioeconomic History    Marital status:      Spouse name: Not on file    Number of children: 1    Years of education: Not on file    Highest education level: Not on file   Social Needs    Financial resource strain: Not on file     Food insecurity - worry: Not on file    Food insecurity - inability: Not on file    Transportation needs - medical: Not on file    Transportation needs - non-medical: Not on file   Occupational History    Occupation:    Tobacco Use    Smoking status: Former Smoker     Types: Cigars    Smokeless tobacco: Current User    Tobacco comment: quit 10 yrs ago   Substance and Sexual Activity    Alcohol use: No     Alcohol/week: 1.0 oz     Types: 2 Standard drinks or equivalent per week    Drug use: No    Sexual activity: Not Currently   Other Topics Concern    Not on file   Social History Narrative    Not on file       FAMILY HISTORY:  Family History   Problem Relation Age of Onset    Hypertension Mother     Anxiety disorder Mother     Diverticulosis Mother     Irritable bowel syndrome Mother     Hypertension Father     Anxiety disorder Father     Anxiety disorder Sister     Colon polyps Brother     Colon cancer Neg Hx     Cirrhosis Neg Hx     Liver cancer Neg Hx     Celiac disease Neg Hx     Crohn's disease Neg Hx     Ulcerative colitis Neg Hx     Esophageal cancer Neg Hx     Stomach cancer Neg Hx     Rectal cancer Neg Hx        ALLERGIES AND MEDICATIONS: updated and reviewed.  Review of patient's allergies indicates:   Allergen Reactions    Flexeril  [cyclobenzaprine]      Other reaction(s): Nausea     Current Outpatient Medications   Medication Sig Dispense Refill    ALPRAZolam (XANAX) 0.5 MG tablet Take 1 tablet (0.5 mg total) by mouth 3 (three) times daily as needed for Anxiety. 90 tablet 2    amLODIPine (NORVASC) 10 MG tablet Take 1 tablet (10 mg total) by mouth once daily. 90 tablet 1    lisinopril (PRINIVIL,ZESTRIL) 40 MG tablet Take 1 tablet (40 mg total) by mouth once daily. 90 tablet 1    metoprolol succinate (TOPROL-XL) 25 MG 24 hr tablet Take 1 tablet (25 mg total) by mouth once daily. 90 tablet 1    naproxen (NAPROSYN) 500 MG tablet Take 1 tablet (500 mg total) by  "mouth 2 (two) times daily with meals. 60 tablet 0    rosuvastatin (CRESTOR) 5 MG tablet Take 1 tablet (5 mg total) by mouth once daily. 90 tablet 3    zolpidem (AMBIEN) 10 mg Tab Take 1 tablet (10 mg total) by mouth nightly. 30 tablet 2    alprostadil (MUSE) 250 MCG pellet 1 each (250 mcg total) by Transurethral route as needed for Erectile Dysfunction. use no more than 3 times per week 6 each 1    HYDROcodone-acetaminophen (NORCO) 7.5-325 mg per tablet Take 1 tablet by mouth every 6 (six) hours as needed. 30 tablet 0     No current facility-administered medications for this visit.          ROS  Review of Systems   Constitutional: Negative for activity change, appetite change, fatigue, fever and unexpected weight change.   HENT: Negative for facial swelling.    Eyes: Negative for visual disturbance.   Respiratory: Negative for chest tightness, shortness of breath, wheezing and stridor.    Cardiovascular: Negative for chest pain, palpitations and leg swelling.   Gastrointestinal: Negative for abdominal distention, abdominal pain, blood in stool, constipation, diarrhea, nausea and vomiting.   Endocrine: Negative for cold intolerance, heat intolerance, polydipsia and polyuria.   Genitourinary: Negative.  Negative for testicular pain and urgency.   Skin: Negative.    Allergic/Immunologic: Negative.    Neurological: Negative for dizziness, weakness, light-headedness, numbness and headaches.   Psychiatric/Behavioral: Negative for agitation and decreased concentration.           Physical Exam  Vitals:    10/05/18 1104   BP: 114/72   BP Location: Left arm   Patient Position: Sitting   BP Method: Large (Manual)   Pulse: 63   Temp: 98.4 °F (36.9 °C)   TempSrc: Oral   SpO2: (!) 94%   Weight: 119.9 kg (264 lb 5.3 oz)   Height: 5' 9" (1.753 m)    Body mass index is 39.03 kg/m².  Weight: 119.9 kg (264 lb 5.3 oz)   Height: 5' 9" (175.3 cm)   Physical Exam   Constitutional: He is oriented to person, place, and time. He appears " well-developed and well-nourished.   HENT:   Head: Normocephalic and atraumatic.   Right Ear: External ear normal.   Left Ear: External ear normal.   Nose: Nose normal.   Mouth/Throat: Oropharynx is clear and moist. No oropharyngeal exudate.   Eyes: Conjunctivae and EOM are normal. Pupils are equal, round, and reactive to light.   Neck: Normal range of motion. Neck supple.   Cardiovascular: Normal rate, regular rhythm, normal heart sounds and intact distal pulses. Exam reveals no gallop and no friction rub.   No murmur heard.  Pulmonary/Chest: Effort normal and breath sounds normal.   Abdominal: Soft. Bowel sounds are normal.   Musculoskeletal: Normal range of motion.   Neurological: He is alert and oriented to person, place, and time. He has normal reflexes.   Skin: Skin is warm and dry.   Psychiatric: He has a normal mood and affect. His behavior is normal. Thought content normal.   Vitals reviewed.        Health Maintenance       Date Due Completion Date    TETANUS VACCINE 05/18/1975 ---    Colonoscopy 05/18/2007 ---    Zoster Vaccine 05/18/2017 ---    Eye Exam 05/23/2018 5/23/2017    Hemoglobin A1c 01/10/2019 7/10/2018    Override on 7/2/2018: Done    Foot Exam 07/02/2019 7/2/2018    Override on 7/2/2018: Done    Lipid Panel 07/02/2019 7/2/2018    Override on 7/2/2018: Done    High Dose Statin 07/10/2019 7/10/2018    Pneumococcal PPSV23 (Medium Risk) (2) 05/18/2022 10/8/2015

## 2019-01-04 ENCOUNTER — OFFICE VISIT (OUTPATIENT)
Dept: FAMILY MEDICINE | Facility: CLINIC | Age: 64
End: 2019-01-04
Payer: MEDICARE

## 2019-01-04 ENCOUNTER — TELEPHONE (OUTPATIENT)
Dept: FAMILY MEDICINE | Facility: CLINIC | Age: 64
End: 2019-01-04

## 2019-01-04 VITALS
WEIGHT: 262.81 LBS | TEMPERATURE: 98 F | SYSTOLIC BLOOD PRESSURE: 134 MMHG | OXYGEN SATURATION: 96 % | HEIGHT: 69 IN | HEART RATE: 69 BPM | BODY MASS INDEX: 38.93 KG/M2 | DIASTOLIC BLOOD PRESSURE: 80 MMHG

## 2019-01-04 DIAGNOSIS — E11.9 CONTROLLED TYPE 2 DIABETES MELLITUS WITHOUT COMPLICATION, WITHOUT LONG-TERM CURRENT USE OF INSULIN: ICD-10-CM

## 2019-01-04 DIAGNOSIS — E66.01 SEVERE OBESITY WITH BODY MASS INDEX (BMI) OF 35.0 TO 39.9 WITH COMORBIDITY: Primary | ICD-10-CM

## 2019-01-04 DIAGNOSIS — G89.29 CHRONIC MIDLINE LOW BACK PAIN, WITH SCIATICA PRESENCE UNSPECIFIED: ICD-10-CM

## 2019-01-04 DIAGNOSIS — K04.7 DENTAL ABSCESS: ICD-10-CM

## 2019-01-04 DIAGNOSIS — I10 ESSENTIAL HYPERTENSION: Chronic | ICD-10-CM

## 2019-01-04 DIAGNOSIS — I20.89 ANGINA AT REST: ICD-10-CM

## 2019-01-04 DIAGNOSIS — I50.9 CONGESTIVE HEART FAILURE, UNSPECIFIED HF CHRONICITY, UNSPECIFIED HEART FAILURE TYPE: ICD-10-CM

## 2019-01-04 DIAGNOSIS — M54.5 CHRONIC MIDLINE LOW BACK PAIN, WITH SCIATICA PRESENCE UNSPECIFIED: ICD-10-CM

## 2019-01-04 DIAGNOSIS — F41.9 ANXIETY: ICD-10-CM

## 2019-01-04 DIAGNOSIS — F51.02 TRANSIENT INSOMNIA: ICD-10-CM

## 2019-01-04 PROCEDURE — 3075F SYST BP GE 130 - 139MM HG: CPT | Mod: CPTII,HCNC,S$GLB, | Performed by: FAMILY MEDICINE

## 2019-01-04 PROCEDURE — 3079F DIAST BP 80-89 MM HG: CPT | Mod: CPTII,HCNC,S$GLB, | Performed by: FAMILY MEDICINE

## 2019-01-04 PROCEDURE — 3008F BODY MASS INDEX DOCD: CPT | Mod: CPTII,HCNC,S$GLB, | Performed by: FAMILY MEDICINE

## 2019-01-04 PROCEDURE — 99214 PR OFFICE/OUTPT VISIT, EST, LEVL IV, 30-39 MIN: ICD-10-PCS | Mod: HCNC,S$GLB,, | Performed by: FAMILY MEDICINE

## 2019-01-04 PROCEDURE — 3079F PR MOST RECENT DIASTOLIC BLOOD PRESSURE 80-89 MM HG: ICD-10-PCS | Mod: CPTII,HCNC,S$GLB, | Performed by: FAMILY MEDICINE

## 2019-01-04 PROCEDURE — 99214 OFFICE O/P EST MOD 30 MIN: CPT | Mod: HCNC,S$GLB,, | Performed by: FAMILY MEDICINE

## 2019-01-04 PROCEDURE — 99999 PR PBB SHADOW E&M-EST. PATIENT-LVL III: CPT | Mod: PBBFAC,HCNC,, | Performed by: FAMILY MEDICINE

## 2019-01-04 PROCEDURE — 99999 PR PBB SHADOW E&M-EST. PATIENT-LVL III: ICD-10-PCS | Mod: PBBFAC,HCNC,, | Performed by: FAMILY MEDICINE

## 2019-01-04 PROCEDURE — 3044F HG A1C LEVEL LT 7.0%: CPT | Mod: CPTII,HCNC,S$GLB, | Performed by: FAMILY MEDICINE

## 2019-01-04 PROCEDURE — 99499 RISK ADDL DX/OHS AUDIT: ICD-10-PCS | Mod: HCNC,S$GLB,, | Performed by: FAMILY MEDICINE

## 2019-01-04 PROCEDURE — 3075F PR MOST RECENT SYSTOLIC BLOOD PRESS GE 130-139MM HG: ICD-10-PCS | Mod: CPTII,HCNC,S$GLB, | Performed by: FAMILY MEDICINE

## 2019-01-04 PROCEDURE — 3008F PR BODY MASS INDEX (BMI) DOCUMENTED: ICD-10-PCS | Mod: CPTII,HCNC,S$GLB, | Performed by: FAMILY MEDICINE

## 2019-01-04 PROCEDURE — 3044F PR MOST RECENT HEMOGLOBIN A1C LEVEL <7.0%: ICD-10-PCS | Mod: CPTII,HCNC,S$GLB, | Performed by: FAMILY MEDICINE

## 2019-01-04 PROCEDURE — 99499 UNLISTED E&M SERVICE: CPT | Mod: HCNC,S$GLB,, | Performed by: FAMILY MEDICINE

## 2019-01-04 RX ORDER — HYDROCODONE BITARTRATE AND ACETAMINOPHEN 7.5; 325 MG/1; MG/1
1 TABLET ORAL EVERY 6 HOURS PRN
Qty: 30 TABLET | Refills: 0 | Status: SHIPPED | OUTPATIENT
Start: 2019-01-04 | End: 2019-04-02 | Stop reason: SDUPTHER

## 2019-01-04 RX ORDER — AMOXICILLIN 500 MG/1
500 TABLET, FILM COATED ORAL EVERY 12 HOURS
Qty: 20 TABLET | Refills: 0 | Status: SHIPPED | OUTPATIENT
Start: 2019-01-04 | End: 2019-01-14

## 2019-01-04 RX ORDER — ALPRAZOLAM 0.5 MG/1
0.5 TABLET ORAL 3 TIMES DAILY PRN
Qty: 90 TABLET | Refills: 2 | Status: SHIPPED | OUTPATIENT
Start: 2019-01-04 | End: 2019-04-02 | Stop reason: SDUPTHER

## 2019-01-04 RX ORDER — ZOLPIDEM TARTRATE 10 MG/1
10 TABLET ORAL NIGHTLY
Qty: 30 TABLET | Refills: 2 | Status: SHIPPED | OUTPATIENT
Start: 2019-01-04 | End: 2019-04-02 | Stop reason: SDUPTHER

## 2019-01-04 NOTE — ASSESSMENT & PLAN NOTE
Pt is currently stable on medication regimen.  Continue current therapy as scheduled.  Contact office with any questions about adjustments on medications.   Well controlled

## 2019-01-04 NOTE — ASSESSMENT & PLAN NOTE
The patient is asked to make an attempt to improve diet and exercise patterns to aid in medical management of this problem.

## 2019-01-04 NOTE — PROGRESS NOTES
Assessment & Plan  Problem List Items Addressed This Visit        Psychiatric    Anxiety    Current Assessment & Plan     The current medical regimen is effective;  continue present plan and medications.  Patient was counseled that a recent study showed that the chronic use of anxiolytic medication may increase the risk for developing dementia.         Relevant Medications    ALPRAZolam (XANAX) 0.5 MG tablet       Cardiac/Vascular    Essential hypertension (Chronic)    Current Assessment & Plan     Pt is currently stable on medication regimen.  Continue current therapy as scheduled.  Contact office with any questions about adjustments on medications.   Well controlled          Angina at rest    Current Assessment & Plan     Patient is stable.  Assess and addressed all modifiable risk factors.  Continue with appropriate management to prevent complications.           CHF (congestive heart failure)    Current Assessment & Plan     Patient is stable.  Assess and addressed all modifiable risk factors.  Continue with appropriate management to prevent complications.              Endocrine    Controlled type 2 diabetes mellitus without complication, without long-term current use of insulin    Current Assessment & Plan     Patient is stable.  Assess and addressed all modifiable risk factors.  Continue with appropriate management to prevent complications.  -  Continue with medication         Severe obesity with body mass index (BMI) of 35.0 to 39.9 with comorbidity - Primary    Current Assessment & Plan     The patient is asked to make an attempt to improve diet and exercise patterns to aid in medical management of this problem.                Orthopedic    Chronic low back pain    Overview     followed by pain management         Relevant Medications    HYDROcodone-acetaminophen (NORCO) 7.5-325 mg per tablet      Other Visit Diagnoses     Transient insomnia        Relevant Medications    zolpidem (AMBIEN) 10 mg Tab             Health Maintenance reviewed.     Follow-up: No Follow-up on file.    ______________________________________________________________________    Chief Complaint  Chief Complaint   Patient presents with    Medication Refill    Follow-up       HPI  Eyad Rodriguez is a 63 y.o. male with multiple medical diagnoses as listed in the medical history and problem list that presents for medication refill.  Pt is known to me with last appointment 10/5/2018.    Patient presents in the office today for refills on his medication.  Patient states he continues to have lower back pain. He denies any nausea or vomiting.  Patient reports that his anxiety is well controlled.      PAST MEDICAL HISTORY:  Past Medical History:   Diagnosis Date    Angina pectoris     Anxiety     BPH (benign prostatic hypertrophy)     Chronic low back pain     followed by pain management    Disorder of kidney and ureter     DJD (degenerative joint disease) of knee     Heart failure     Hemorrhoid     HTN (hypertension)     Insomnia     Obesity     Personal history of kidney stones     Tobacco abuse     Type 2 diabetes mellitus        PAST SURGICAL HISTORY:  Past Surgical History:   Procedure Laterality Date    COLONOSCOPY N/A 7/10/2015    Performed [facility] (4TH FLR)    cyst removed from back      ESOPHAGOGASTRODUODENOSCOPY (EGD) N/A 7/10/2015    Performed by [facility] at [facility] ENDO (4TH FLR)       SOCIAL HISTORY:  Social History     Socioeconomic History    Marital status:      Spouse name: Not on file    Number of children: 1    Years of education: Not on file    Highest education level: Not on file   Social Needs    Financial resource strain: Not on file    Food insecurity - worry: Not on file    Food insecurity - inability: Not on file    Transportation needs - medical: Not on file    Transportation needs - non-medical: Not on file   Occupational History    Occupation:     Tobacco Use    Smoking status: Former Smoker     Types: Cigars    Smokeless tobacco: Current User    Tobacco comment: quit 10 yrs ago   Substance and Sexual Activity    Alcohol use: No     Alcohol/week: 1.0 oz     Types: 2 Standard drinks or equivalent per week    Drug use: No    Sexual activity: Not Currently   Other Topics Concern    Not on file   Social History Narrative    Not on file       FAMILY HISTORY:  Family History   Problem Relation Age of Onset    Hypertension Mother     Anxiety disorder Mother     Diverticulosis Mother     Irritable bowel syndrome Mother     Hypertension Father     Anxiety disorder Father     Anxiety disorder Sister     Colon polyps Brother     Colon cancer Neg Hx     Cirrhosis Neg Hx     Liver cancer Neg Hx     Celiac disease Neg Hx     Crohn's disease Neg Hx     Ulcerative colitis Neg Hx     Esophageal cancer Neg Hx     Stomach cancer Neg Hx     Rectal cancer Neg Hx        ALLERGIES AND MEDICATIONS: updated and reviewed.  Review of patient's allergies indicates:   Allergen Reactions    Flexeril  [cyclobenzaprine]      Other reaction(s): Nausea     Current Outpatient Medications   Medication Sig Dispense Refill    ALPRAZolam (XANAX) 0.5 MG tablet Take 1 tablet (0.5 mg total) by mouth 3 (three) times daily as needed for Anxiety. 90 tablet 2    alprostadil (MUSE) 250 MCG pellet 1 each (250 mcg total) by Transurethral route as needed for Erectile Dysfunction. use no more than 3 times per week 6 each 1    amLODIPine (NORVASC) 10 MG tablet Take 1 tablet (10 mg total) by mouth once daily. 90 tablet 1    HYDROcodone-acetaminophen (NORCO) 7.5-325 mg per tablet Take 1 tablet by mouth every 6 (six) hours as needed. 30 tablet 0    lisinopril (PRINIVIL,ZESTRIL) 40 MG tablet Take 1 tablet (40 mg total) by mouth once daily. 90 tablet 1    metoprolol succinate (TOPROL-XL) 25 MG 24 hr tablet Take 1 tablet (25 mg total) by mouth once daily. 90 tablet 1     "naproxen (NAPROSYN) 500 MG tablet Take 1 tablet (500 mg total) by mouth 2 (two) times daily with meals. 60 tablet 0    rosuvastatin (CRESTOR) 5 MG tablet Take 1 tablet (5 mg total) by mouth once daily. 90 tablet 3    zolpidem (AMBIEN) 10 mg Tab Take 1 tablet (10 mg total) by mouth nightly. 30 tablet 2     No current facility-administered medications for this visit.          ROS  Review of Systems   Constitutional: Negative for activity change, appetite change, fatigue, fever and unexpected weight change.   HENT: Negative for facial swelling.    Eyes: Negative for visual disturbance.   Respiratory: Negative for chest tightness, shortness of breath, wheezing and stridor.    Cardiovascular: Negative for chest pain, palpitations and leg swelling.   Gastrointestinal: Negative for abdominal distention, abdominal pain, blood in stool, constipation, diarrhea, nausea and vomiting.   Endocrine: Negative for cold intolerance, heat intolerance, polydipsia and polyuria.   Genitourinary: Negative.  Negative for testicular pain and urgency.   Musculoskeletal: Positive for arthralgias and back pain.   Skin: Negative.    Allergic/Immunologic: Negative.    Neurological: Negative for dizziness, weakness, light-headedness, numbness and headaches.   Psychiatric/Behavioral: Negative for agitation and decreased concentration.           Physical Exam  Vitals:    01/04/19 1001   BP: 134/80   BP Location: Left arm   Patient Position: Sitting   BP Method: Large (Manual)   Pulse: 69   Temp: 97.8 °F (36.6 °C)   TempSrc: Oral   SpO2: 96%   Weight: 119.2 kg (262 lb 12.6 oz)   Height: 5' 9" (1.753 m)    Body mass index is 38.81 kg/m².  Weight: 119.2 kg (262 lb 12.6 oz)   Height: 5' 9" (175.3 cm)   Physical Exam   Constitutional: He is oriented to person, place, and time. He appears well-developed and well-nourished.   HENT:   Head: Normocephalic and atraumatic.   Right Ear: External ear normal.   Left Ear: External ear normal.   Nose: Nose " normal.   Mouth/Throat: Oropharynx is clear and moist.   Eyes: Conjunctivae and EOM are normal. Pupils are equal, round, and reactive to light.   Neck: Normal range of motion.   Cardiovascular: Normal rate, regular rhythm and normal heart sounds.   Pulmonary/Chest: Effort normal and breath sounds normal.   Neurological: He is alert and oriented to person, place, and time.   Skin: Skin is warm and dry.   Psychiatric: He has a normal mood and affect. His behavior is normal.   Vitals reviewed.      Health Maintenance       Date Due Completion Date    TETANUS VACCINE 05/18/1975 ---    Colonoscopy 05/18/2007 ---    Zoster Vaccine 05/18/2017 ---    Eye Exam 05/23/2018 5/23/2017    Hemoglobin A1c 01/10/2019 7/10/2018    Override on 7/2/2018: Done    Foot Exam 07/02/2019 7/2/2018    Override on 7/2/2018: Done    Lipid Panel 07/02/2019 7/2/2018    Override on 7/2/2018: Done    High Dose Statin 10/14/2019 10/14/2018              Patient note was created using [employer].  Any errors in syntax or even information may not have been identified and edited on initial review prior to signing this note.

## 2019-01-04 NOTE — TELEPHONE ENCOUNTER
----- Message from Mari Alva sent at 1/4/2019 11:14 AM CST -----  Contact: Pharmacy   Is calling stating pt is there and says an antibiotic was supposed to be called in but wasn't. Please call at [phone].

## 2019-01-04 NOTE — ASSESSMENT & PLAN NOTE
Patient is stable.  Assess and addressed all modifiable risk factors.  Continue with appropriate management to prevent complications.  -  Continue with medication

## 2019-01-25 DIAGNOSIS — E11.9 TYPE 2 DIABETES MELLITUS WITHOUT COMPLICATION: ICD-10-CM

## 2019-02-01 DIAGNOSIS — I10 ESSENTIAL HYPERTENSION: ICD-10-CM

## 2019-02-01 RX ORDER — METOPROLOL SUCCINATE 25 MG/1
25 TABLET, EXTENDED RELEASE ORAL DAILY
Qty: 90 TABLET | Refills: 1 | Status: SHIPPED | OUTPATIENT
Start: 2019-02-01 | End: 2019-07-31 | Stop reason: SDUPTHER

## 2019-02-01 RX ORDER — AMLODIPINE BESYLATE 10 MG/1
10 TABLET ORAL DAILY
Qty: 90 TABLET | Refills: 1 | Status: SHIPPED | OUTPATIENT
Start: 2019-02-01 | End: 2019-07-31 | Stop reason: SDUPTHER

## 2019-02-01 RX ORDER — LISINOPRIL 40 MG/1
40 TABLET ORAL DAILY
Qty: 90 TABLET | Refills: 1 | Status: SHIPPED | OUTPATIENT
Start: 2019-02-01 | End: 2019-07-31 | Stop reason: SDUPTHER

## 2019-02-01 NOTE — TELEPHONE ENCOUNTER
----- Message from Mireya Jones sent at 2/1/2019  1:17 PM CST -----  Contact: Self     Self/ call back number [phone]    Refills:  amLODIPine (NORVASC) 10 MG tablet]   metoprolol succinate (TOPROL-XL) 25 MG 24 hr tablet]  lisinopril (PRINIVIL,ZESTRIL) 40 MG tablet]    Thank you    [facility] [place] [facility] [id number] - [place] [address] - [address] [facility] [address]  [address] [facility] [address]  [place] [id number]  Phone: [phone] Fax: [phone]

## 2019-02-12 ENCOUNTER — PES CALL (OUTPATIENT)
Dept: ADMINISTRATIVE | Facility: CLINIC | Age: 64
End: 2019-02-12

## 2019-03-18 DIAGNOSIS — E11.9 TYPE 2 DIABETES MELLITUS WITHOUT COMPLICATION, UNSPECIFIED WHETHER LONG TERM INSULIN USE: ICD-10-CM

## 2019-04-02 ENCOUNTER — OFFICE VISIT (OUTPATIENT)
Dept: FAMILY MEDICINE | Facility: CLINIC | Age: 64
End: 2019-04-02
Payer: MEDICARE

## 2019-04-02 VITALS
HEIGHT: 69 IN | BODY MASS INDEX: 39.12 KG/M2 | DIASTOLIC BLOOD PRESSURE: 78 MMHG | OXYGEN SATURATION: 96 % | TEMPERATURE: 98 F | HEART RATE: 62 BPM | WEIGHT: 264.13 LBS | SYSTOLIC BLOOD PRESSURE: 120 MMHG

## 2019-04-02 DIAGNOSIS — G89.29 CHRONIC MIDLINE LOW BACK PAIN, WITH SCIATICA PRESENCE UNSPECIFIED: ICD-10-CM

## 2019-04-02 DIAGNOSIS — F41.9 ANXIETY: ICD-10-CM

## 2019-04-02 DIAGNOSIS — F51.02 TRANSIENT INSOMNIA: ICD-10-CM

## 2019-04-02 DIAGNOSIS — M54.5 CHRONIC MIDLINE LOW BACK PAIN, WITH SCIATICA PRESENCE UNSPECIFIED: ICD-10-CM

## 2019-04-02 PROCEDURE — 3074F SYST BP LT 130 MM HG: CPT | Mod: HCNC,CPTII,S$GLB, | Performed by: FAMILY MEDICINE

## 2019-04-02 PROCEDURE — 3078F DIAST BP <80 MM HG: CPT | Mod: HCNC,CPTII,S$GLB, | Performed by: FAMILY MEDICINE

## 2019-04-02 PROCEDURE — 3074F PR MOST RECENT SYSTOLIC BLOOD PRESSURE < 130 MM HG: ICD-10-PCS | Mod: HCNC,CPTII,S$GLB, | Performed by: FAMILY MEDICINE

## 2019-04-02 PROCEDURE — 99214 PR OFFICE/OUTPT VISIT, EST, LEVL IV, 30-39 MIN: ICD-10-PCS | Mod: HCNC,S$GLB,, | Performed by: FAMILY MEDICINE

## 2019-04-02 PROCEDURE — 3008F BODY MASS INDEX DOCD: CPT | Mod: HCNC,CPTII,S$GLB, | Performed by: FAMILY MEDICINE

## 2019-04-02 PROCEDURE — 3078F PR MOST RECENT DIASTOLIC BLOOD PRESSURE < 80 MM HG: ICD-10-PCS | Mod: HCNC,CPTII,S$GLB, | Performed by: FAMILY MEDICINE

## 2019-04-02 PROCEDURE — 99999 PR PBB SHADOW E&M-EST. PATIENT-LVL III: ICD-10-PCS | Mod: PBBFAC,HCNC,, | Performed by: FAMILY MEDICINE

## 2019-04-02 PROCEDURE — 99999 PR PBB SHADOW E&M-EST. PATIENT-LVL III: CPT | Mod: PBBFAC,HCNC,, | Performed by: FAMILY MEDICINE

## 2019-04-02 PROCEDURE — 3008F PR BODY MASS INDEX (BMI) DOCUMENTED: ICD-10-PCS | Mod: HCNC,CPTII,S$GLB, | Performed by: FAMILY MEDICINE

## 2019-04-02 PROCEDURE — 99214 OFFICE O/P EST MOD 30 MIN: CPT | Mod: HCNC,S$GLB,, | Performed by: FAMILY MEDICINE

## 2019-04-02 RX ORDER — HYDROCODONE BITARTRATE AND ACETAMINOPHEN 7.5; 325 MG/1; MG/1
1 TABLET ORAL EVERY 6 HOURS PRN
Qty: 30 TABLET | Refills: 0 | Status: SHIPPED | OUTPATIENT
Start: 2019-04-02 | End: 2019-06-27 | Stop reason: SDUPTHER

## 2019-04-02 RX ORDER — ZOLPIDEM TARTRATE 10 MG/1
10 TABLET ORAL NIGHTLY
Qty: 30 TABLET | Refills: 2 | Status: SHIPPED | OUTPATIENT
Start: 2019-04-02 | End: 2019-06-27 | Stop reason: SDUPTHER

## 2019-04-02 RX ORDER — ALPRAZOLAM 0.5 MG/1
0.5 TABLET ORAL 3 TIMES DAILY PRN
Qty: 90 TABLET | Refills: 2 | Status: SHIPPED | OUTPATIENT
Start: 2019-04-02 | End: 2019-06-27 | Stop reason: SDUPTHER

## 2019-04-02 NOTE — PROGRESS NOTES
Assessment & Plan  Problem List Items Addressed This Visit        Psychiatric    Anxiety    Relevant Medications    ALPRAZolam (XANAX) 0.5 MG tablet       Orthopedic    Chronic low back pain    Overview     followed by pain management         Relevant Medications    HYDROcodone-acetaminophen (NORCO) 7.5-325 mg per tablet      Other Visit Diagnoses     Transient insomnia        Relevant Medications    zolpidem (AMBIEN) 10 mg Tab            Health Maintenance reviewed    Follow-up: No follow-ups on file.    ______________________________________________________________________    Chief Complaint  Chief Complaint   Patient presents with    Medication Refill       HPI  Eyad Rodriguez is a 63 y.o. male with multiple medical diagnoses as listed in the medical history and problem list that presents for medication refill.  Pt is known to me with last appointment 1/4/2019.    He reports increased hip and knee pain.  He is working on disability with the [employer].    Patient denies any increased pain. Patient states he is otherwise doing well.    PAST MEDICAL HISTORY:  Past Medical History:   Diagnosis Date    Angina pectoris     Anxiety     BPH (benign prostatic hypertrophy)     Chronic low back pain     followed by pain management    Disorder of kidney and ureter     DJD (degenerative joint disease) of knee     Heart failure     Hemorrhoid     HTN (hypertension)     Insomnia     Obesity     Personal history of kidney stones     Tobacco abuse     Type 2 diabetes mellitus        PAST SURGICAL HISTORY:  Past Surgical History:   Procedure Laterality Date    COLONOSCOPY N/A 7/10/2015    Performed by [facility] at [facility] END[facility] (4TH FLR)    cyst removed from back      ESOPHAGOGASTRODUODENOSCOPY (EGD) N/A 7/10/2015    Performed by Chuck [facility]acruz MD at [facility] ENDO (4TH FLR)       SOCIAL HISTORY:  Social History     Socioeconomic History    Marital status:      Spouse name: Not on file    Number of  children: 1    Years of education: Not on file    Highest education level: Not on file   Occupational History    Occupation:    Social Needs    Financial resource strain: Not on file    Food insecurity:     Worry: Not on file     Inability: Not on file    Transportation needs:     Medical: Not on file     Non-medical: Not on file   Tobacco Use    Smoking status: Former Smoker     Types: Cigars    Smokeless tobacco: Current User    Tobacco comment: quit 10 yrs ago   Substance and Sexual Activity    Alcohol use: No     Alcohol/week: 1.0 oz     Types: 2 Standard drinks or equivalent per week    Drug use: No    Sexual activity: Not Currently   Lifestyle    Physical activity:     Days per week: Not on file     Minutes per session: Not on file    Stress: Not on file   Relationships    Social connections:     Talks on phone: Not on file     Gets together: Not on file     Attends Restorationist service: Not on file     Active member of club or organization: Not on file     Attends meetings of clubs or organizations: Not on file     Relationship status: Not on file   Other Topics Concern    Not on file   Social History Narrative    Not on file       FAMILY HISTORY:  Family History   Problem Relation Age of Onset    Hypertension Mother     Anxiety disorder Mother     Diverticulosis Mother     Irritable bowel syndrome Mother     Hypertension Father     Anxiety disorder Father     Anxiety disorder Sister     Colon polyps Brother     Colon cancer Neg Hx     Cirrhosis Neg Hx     Liver cancer Neg Hx     Celiac disease Neg Hx     Crohn's disease Neg Hx     Ulcerative colitis Neg Hx     Esophageal cancer Neg Hx     Stomach cancer Neg Hx     Rectal cancer Neg Hx        ALLERGIES AND MEDICATIONS: updated and reviewed.  Review of patient's allergies indicates:   Allergen Reactions    Codeine      Other reaction(s): Nausea  Other reaction(s): Nausea    Flexeril  [cyclobenzaprine]      Other  reaction(s): Nausea     Current Outpatient Medications   Medication Sig Dispense Refill    amLODIPine (NORVASC) 10 MG tablet Take 1 tablet (10 mg total) by mouth once daily. 90 tablet 1    HYDROcodone-acetaminophen (NORCO) 7.5-325 mg per tablet Take 1 tablet by mouth every 6 (six) hours as needed. 30 tablet 0    lisinopril (PRINIVIL,ZESTRIL) 40 MG tablet Take 1 tablet (40 mg total) by mouth once daily. 90 tablet 1    metoprolol succinate (TOPROL-XL) 25 MG 24 hr tablet Take 1 tablet (25 mg total) by mouth once daily. 90 tablet 1    naproxen (NAPROSYN) 500 MG tablet Take 1 tablet (500 mg total) by mouth 2 (two) times daily with meals. 60 tablet 0    rosuvastatin (CRESTOR) 5 MG tablet Take 1 tablet (5 mg total) by mouth once daily. 90 tablet 3    ALPRAZolam (XANAX) 0.5 MG tablet Take 1 tablet (0.5 mg total) by mouth 3 (three) times daily as needed for Anxiety. 90 tablet 2    alprostadil (MUSE) 250 MCG pellet 1 each (250 mcg total) by Transurethral route as needed for Erectile Dysfunction. use no more than 3 times per week 6 each 1    zolpidem (AMBIEN) 10 mg Tab Take 1 tablet (10 mg total) by mouth nightly. 30 tablet 2     No current facility-administered medications for this visit.          ROS  Review of Systems   Constitutional: Negative for activity change, appetite change, fatigue, fever and unexpected weight change.   HENT: Negative for facial swelling.    Eyes: Negative for visual disturbance.   Respiratory: Negative for chest tightness, shortness of breath, wheezing and stridor.    Cardiovascular: Negative for chest pain, palpitations and leg swelling.   Gastrointestinal: Negative for abdominal distention, abdominal pain, blood in stool, constipation, diarrhea, nausea and vomiting.   Endocrine: Negative for cold intolerance, heat intolerance, polydipsia and polyuria.   Genitourinary: Negative.  Negative for testicular pain and urgency.   Musculoskeletal: Positive for arthralgias and back pain.   Skin:  "Negative.    Allergic/Immunologic: Negative.    Neurological: Negative for dizziness, weakness, light-headedness, numbness and headaches.   Psychiatric/Behavioral: Negative for agitation and decreased concentration.       Physical Exam  Vitals:    04/02/19 1023   BP: 120/78   BP Location: Left arm   Patient Position: Sitting   BP Method: Large (Manual)   Pulse: 62   Temp: 98 °F (36.7 °C)   TempSrc: Oral   SpO2: 96%   Weight: 119.8 kg (264 lb 1.8 oz)   Height: 5' 9" (1.753 m)    Body mass index is 39 kg/m².  Weight: 119.8 kg (264 lb 1.8 oz)   Height: 5' 9" (175.3 cm)   Physical Exam   Constitutional: He is oriented to person, place, and time. He appears well-developed and well-nourished.   HENT:   Head: Normocephalic and atraumatic.   Right Ear: External ear normal.   Left Ear: External ear normal.   Nose: Nose normal.   Mouth/Throat: Oropharynx is clear and moist.   Eyes: Pupils are equal, round, and reactive to light. Conjunctivae and EOM are normal.   Neck: Normal range of motion.   Cardiovascular: Normal rate, regular rhythm and normal heart sounds.   Pulmonary/Chest: Effort normal and breath sounds normal.   Neurological: He is alert and oriented to person, place, and time.   Skin: Skin is warm and dry.   Psychiatric: He has a normal mood and affect. His behavior is normal.   Vitals reviewed.    Health Maintenance       Date Due Completion Date    TETANUS VACCINE 05/18/1975 ---    Aspirin/Antiplatelet Therapy 05/18/1975 ---    Colonoscopy 05/18/2007 ---    Zoster Vaccine 05/18/2017 ---    Eye Exam 05/23/2018 5/23/2017    Hemoglobin A1c 01/10/2019 7/10/2018    Override on 7/2/2018: Done    Foot Exam 07/02/2019 7/2/2018    Override on 7/2/2018: Done    Lipid Panel 07/02/2019 7/2/2018    Override on 7/2/2018: Done    High Dose Statin 01/04/2020 1/4/2019              Patient note was created using [employer].  Any errors in syntax or even information may not have been identified and edited on initial review prior to " signing this note.

## 2019-04-05 DIAGNOSIS — Z12.11 COLON CANCER SCREENING: ICD-10-CM

## 2019-06-27 ENCOUNTER — OFFICE VISIT (OUTPATIENT)
Dept: FAMILY MEDICINE | Facility: CLINIC | Age: 64
End: 2019-06-27
Payer: MEDICARE

## 2019-06-27 ENCOUNTER — LAB VISIT (OUTPATIENT)
Dept: LAB | Facility: HOSPITAL | Age: 64
End: 2019-06-27
Payer: MEDICARE

## 2019-06-27 VITALS
WEIGHT: 258.38 LBS | TEMPERATURE: 99 F | BODY MASS INDEX: 38.27 KG/M2 | HEIGHT: 69 IN | DIASTOLIC BLOOD PRESSURE: 82 MMHG | SYSTOLIC BLOOD PRESSURE: 136 MMHG | HEART RATE: 77 BPM | OXYGEN SATURATION: 98 %

## 2019-06-27 VITALS
WEIGHT: 258.38 LBS | SYSTOLIC BLOOD PRESSURE: 134 MMHG | HEART RATE: 77 BPM | OXYGEN SATURATION: 98 % | HEIGHT: 69 IN | BODY MASS INDEX: 38.27 KG/M2 | DIASTOLIC BLOOD PRESSURE: 82 MMHG | TEMPERATURE: 99 F

## 2019-06-27 DIAGNOSIS — Z13.6 ENCOUNTER FOR LIPID SCREENING FOR CARDIOVASCULAR DISEASE: ICD-10-CM

## 2019-06-27 DIAGNOSIS — F41.9 ANXIETY: ICD-10-CM

## 2019-06-27 DIAGNOSIS — F51.02 TRANSIENT INSOMNIA: ICD-10-CM

## 2019-06-27 DIAGNOSIS — I42.9 CARDIOMYOPATHY, UNSPECIFIED TYPE: ICD-10-CM

## 2019-06-27 DIAGNOSIS — Z12.11 SCREENING FOR MALIGNANT NEOPLASM OF COLON: ICD-10-CM

## 2019-06-27 DIAGNOSIS — I50.9 CONGESTIVE HEART FAILURE, UNSPECIFIED HF CHRONICITY, UNSPECIFIED HEART FAILURE TYPE: ICD-10-CM

## 2019-06-27 DIAGNOSIS — I10 ESSENTIAL HYPERTENSION: Chronic | ICD-10-CM

## 2019-06-27 DIAGNOSIS — Z13.220 ENCOUNTER FOR LIPID SCREENING FOR CARDIOVASCULAR DISEASE: ICD-10-CM

## 2019-06-27 DIAGNOSIS — I20.89 ANGINA AT REST: ICD-10-CM

## 2019-06-27 DIAGNOSIS — M54.5 CHRONIC MIDLINE LOW BACK PAIN, WITH SCIATICA PRESENCE UNSPECIFIED: ICD-10-CM

## 2019-06-27 DIAGNOSIS — E11.9 TYPE 2 DIABETES MELLITUS WITHOUT COMPLICATION: ICD-10-CM

## 2019-06-27 DIAGNOSIS — E11.9 CONTROLLED TYPE 2 DIABETES MELLITUS WITHOUT COMPLICATION, WITHOUT LONG-TERM CURRENT USE OF INSULIN: ICD-10-CM

## 2019-06-27 DIAGNOSIS — G89.29 CHRONIC MIDLINE LOW BACK PAIN, WITH SCIATICA PRESENCE UNSPECIFIED: ICD-10-CM

## 2019-06-27 DIAGNOSIS — Z00.00 ENCOUNTER FOR PREVENTIVE HEALTH EXAMINATION: Primary | ICD-10-CM

## 2019-06-27 DIAGNOSIS — E66.01 SEVERE OBESITY WITH BODY MASS INDEX (BMI) OF 35.0 TO 39.9 WITH COMORBIDITY: ICD-10-CM

## 2019-06-27 LAB
ALBUMIN SERPL BCP-MCNC: 3.8 G/DL (ref 3.5–5.2)
ALP SERPL-CCNC: 103 U/L (ref 55–135)
ALT SERPL W/O P-5'-P-CCNC: 76 U/L (ref 10–44)
ANION GAP SERPL CALC-SCNC: 7 MMOL/L (ref 8–16)
AST SERPL-CCNC: 57 U/L (ref 10–40)
BASOPHILS # BLD AUTO: 0.12 K/UL (ref 0–0.2)
BASOPHILS NFR BLD: 1.2 % (ref 0–1.9)
BILIRUB SERPL-MCNC: 0.6 MG/DL (ref 0.1–1)
BUN SERPL-MCNC: 10 MG/DL (ref 8–23)
CALCIUM SERPL-MCNC: 10 MG/DL (ref 8.7–10.5)
CHLORIDE SERPL-SCNC: 100 MMOL/L (ref 95–110)
CHOLEST SERPL-MCNC: 192 MG/DL (ref 120–199)
CHOLEST/HDLC SERPL: 4.6 {RATIO} (ref 2–5)
CO2 SERPL-SCNC: 27 MMOL/L (ref 23–29)
CREAT SERPL-MCNC: 1.2 MG/DL (ref 0.5–1.4)
DIFFERENTIAL METHOD: NORMAL
EOSINOPHIL # BLD AUTO: 0.3 K/UL (ref 0–0.5)
EOSINOPHIL NFR BLD: 2.8 % (ref 0–8)
ERYTHROCYTE [DISTWIDTH] IN BLOOD BY AUTOMATED COUNT: 12.7 % (ref 11.5–14.5)
EST. GFR  (AFRICAN AMERICAN): >60 ML/MIN/1.73 M^2
EST. GFR  (NON AFRICAN AMERICAN): >60 ML/MIN/1.73 M^2
ESTIMATED AVG GLUCOSE: 229 MG/DL (ref 68–131)
GLUCOSE SERPL-MCNC: 317 MG/DL (ref 70–110)
HBA1C MFR BLD HPLC: 9.6 % (ref 4–5.6)
HCT VFR BLD AUTO: 44.3 % (ref 40–54)
HDLC SERPL-MCNC: 42 MG/DL (ref 40–75)
HDLC SERPL: 21.9 % (ref 20–50)
HGB BLD-MCNC: 14.7 G/DL (ref 14–18)
IMM GRANULOCYTES # BLD AUTO: 0.04 K/UL (ref 0–0.04)
IMM GRANULOCYTES NFR BLD AUTO: 0.4 % (ref 0–0.5)
LDLC SERPL CALC-MCNC: 107 MG/DL (ref 63–159)
LYMPHOCYTES # BLD AUTO: 3.3 K/UL (ref 1–4.8)
LYMPHOCYTES NFR BLD: 32.8 % (ref 18–48)
MCH RBC QN AUTO: 29.8 PG (ref 27–31)
MCHC RBC AUTO-ENTMCNC: 33.2 G/DL (ref 32–36)
MCV RBC AUTO: 90 FL (ref 82–98)
MONOCYTES # BLD AUTO: 0.8 K/UL (ref 0.3–1)
MONOCYTES NFR BLD: 7.9 % (ref 4–15)
NEUTROPHILS # BLD AUTO: 5.5 K/UL (ref 1.8–7.7)
NEUTROPHILS NFR BLD: 54.9 % (ref 38–73)
NONHDLC SERPL-MCNC: 150 MG/DL
NRBC BLD-RTO: 0 /100 WBC
PLATELET # BLD AUTO: 268 K/UL (ref 150–350)
PMV BLD AUTO: 10.3 FL (ref 9.2–12.9)
POTASSIUM SERPL-SCNC: 4.6 MMOL/L (ref 3.5–5.1)
PROT SERPL-MCNC: 7.9 G/DL (ref 6–8.4)
RBC # BLD AUTO: 4.94 M/UL (ref 4.6–6.2)
SODIUM SERPL-SCNC: 134 MMOL/L (ref 136–145)
TRIGL SERPL-MCNC: 215 MG/DL (ref 30–150)
WBC # BLD AUTO: 10.02 K/UL (ref 3.9–12.7)

## 2019-06-27 PROCEDURE — 3046F HEMOGLOBIN A1C LEVEL >9.0%: CPT | Mod: HCNC,CPTII,S$GLB, | Performed by: NURSE PRACTITIONER

## 2019-06-27 PROCEDURE — 3008F BODY MASS INDEX DOCD: CPT | Mod: HCNC,CPTII,S$GLB, | Performed by: FAMILY MEDICINE

## 2019-06-27 PROCEDURE — 99999 PR PBB SHADOW E&M-EST. PATIENT-LVL III: CPT | Mod: PBBFAC,HCNC,, | Performed by: FAMILY MEDICINE

## 2019-06-27 PROCEDURE — 99999 PR PBB SHADOW E&M-EST. PATIENT-LVL IV: CPT | Mod: PBBFAC,HCNC,, | Performed by: NURSE PRACTITIONER

## 2019-06-27 PROCEDURE — 3075F PR MOST RECENT SYSTOLIC BLOOD PRESS GE 130-139MM HG: ICD-10-PCS | Mod: HCNC,CPTII,S$GLB, | Performed by: NURSE PRACTITIONER

## 2019-06-27 PROCEDURE — 99214 OFFICE O/P EST MOD 30 MIN: CPT | Mod: HCNC,S$GLB,, | Performed by: FAMILY MEDICINE

## 2019-06-27 PROCEDURE — 99214 PR OFFICE/OUTPT VISIT, EST, LEVL IV, 30-39 MIN: ICD-10-PCS | Mod: HCNC,S$GLB,, | Performed by: FAMILY MEDICINE

## 2019-06-27 PROCEDURE — 3075F SYST BP GE 130 - 139MM HG: CPT | Mod: HCNC,CPTII,S$GLB, | Performed by: NURSE PRACTITIONER

## 2019-06-27 PROCEDURE — 3079F PR MOST RECENT DIASTOLIC BLOOD PRESSURE 80-89 MM HG: ICD-10-PCS | Mod: HCNC,CPTII,S$GLB, | Performed by: FAMILY MEDICINE

## 2019-06-27 PROCEDURE — 85025 COMPLETE CBC W/AUTO DIFF WBC: CPT | Mod: HCNC

## 2019-06-27 PROCEDURE — G0439 PR MEDICARE ANNUAL WELLNESS SUBSEQUENT VISIT: ICD-10-PCS | Mod: HCNC,S$GLB,, | Performed by: NURSE PRACTITIONER

## 2019-06-27 PROCEDURE — 3075F PR MOST RECENT SYSTOLIC BLOOD PRESS GE 130-139MM HG: ICD-10-PCS | Mod: HCNC,CPTII,S$GLB, | Performed by: FAMILY MEDICINE

## 2019-06-27 PROCEDURE — 3046F PR MOST RECENT HEMOGLOBIN A1C LEVEL > 9.0%: ICD-10-PCS | Mod: HCNC,CPTII,S$GLB, | Performed by: NURSE PRACTITIONER

## 2019-06-27 PROCEDURE — 99499 UNLISTED E&M SERVICE: CPT | Mod: HCNC,S$GLB,, | Performed by: NURSE PRACTITIONER

## 2019-06-27 PROCEDURE — 3079F PR MOST RECENT DIASTOLIC BLOOD PRESSURE 80-89 MM HG: ICD-10-PCS | Mod: HCNC,CPTII,S$GLB, | Performed by: NURSE PRACTITIONER

## 2019-06-27 PROCEDURE — 83036 HEMOGLOBIN GLYCOSYLATED A1C: CPT | Mod: HCNC

## 2019-06-27 PROCEDURE — 3079F DIAST BP 80-89 MM HG: CPT | Mod: HCNC,CPTII,S$GLB, | Performed by: FAMILY MEDICINE

## 2019-06-27 PROCEDURE — 36415 COLL VENOUS BLD VENIPUNCTURE: CPT | Mod: HCNC,PO

## 2019-06-27 PROCEDURE — 3079F DIAST BP 80-89 MM HG: CPT | Mod: HCNC,CPTII,S$GLB, | Performed by: NURSE PRACTITIONER

## 2019-06-27 PROCEDURE — 80061 LIPID PANEL: CPT | Mod: HCNC

## 2019-06-27 PROCEDURE — 99499 RISK ADDL DX/OHS AUDIT: ICD-10-PCS | Mod: HCNC,S$GLB,, | Performed by: NURSE PRACTITIONER

## 2019-06-27 PROCEDURE — 99999 PR PBB SHADOW E&M-EST. PATIENT-LVL IV: ICD-10-PCS | Mod: PBBFAC,HCNC,, | Performed by: NURSE PRACTITIONER

## 2019-06-27 PROCEDURE — 3008F PR BODY MASS INDEX (BMI) DOCUMENTED: ICD-10-PCS | Mod: HCNC,CPTII,S$GLB, | Performed by: FAMILY MEDICINE

## 2019-06-27 PROCEDURE — 99999 PR PBB SHADOW E&M-EST. PATIENT-LVL III: ICD-10-PCS | Mod: PBBFAC,HCNC,, | Performed by: FAMILY MEDICINE

## 2019-06-27 PROCEDURE — G0439 PPPS, SUBSEQ VISIT: HCPCS | Mod: HCNC,S$GLB,, | Performed by: NURSE PRACTITIONER

## 2019-06-27 PROCEDURE — 3075F SYST BP GE 130 - 139MM HG: CPT | Mod: HCNC,CPTII,S$GLB, | Performed by: FAMILY MEDICINE

## 2019-06-27 PROCEDURE — 80053 COMPREHEN METABOLIC PANEL: CPT | Mod: HCNC

## 2019-06-27 RX ORDER — ALPRAZOLAM 0.5 MG/1
0.5 TABLET ORAL 3 TIMES DAILY PRN
Qty: 90 TABLET | Refills: 2 | Status: SHIPPED | OUTPATIENT
Start: 2019-06-27 | End: 2019-09-25 | Stop reason: SDUPTHER

## 2019-06-27 RX ORDER — ZOLPIDEM TARTRATE 10 MG/1
10 TABLET ORAL NIGHTLY
Qty: 30 TABLET | Refills: 2 | Status: SHIPPED | OUTPATIENT
Start: 2019-06-27 | End: 2019-09-25 | Stop reason: SDUPTHER

## 2019-06-27 RX ORDER — HYDROCODONE BITARTRATE AND ACETAMINOPHEN 7.5; 325 MG/1; MG/1
1 TABLET ORAL EVERY 6 HOURS PRN
Qty: 30 TABLET | Refills: 0 | Status: SHIPPED | OUTPATIENT
Start: 2019-06-27 | End: 2019-09-25 | Stop reason: SDUPTHER

## 2019-06-27 RX ORDER — NAPROXEN 500 MG/1
500 TABLET ORAL 2 TIMES DAILY WITH MEALS
Qty: 60 TABLET | Refills: 0 | Status: SHIPPED | OUTPATIENT
Start: 2019-06-27 | End: 2020-04-24

## 2019-06-27 NOTE — PROGRESS NOTES
"Eyad Rodriguez presented for an initial Medicare AWV today. The following components were reviewed and updated:    · Medical history  · Family History  · Social history  · Allergies and Current Medications  · Health Risk Assessment  · Health Maintenance  · Care Team    **See Completed Assessments for Annual Wellness visit with in the encounter summary    The following assessments were completed:  · Depression Screening  · Cognitive function Screening  · Timed Get Up Test  · Whisper Test    Vitals:    06/27/19 0846   BP: 134/82   BP Location: Right arm   Patient Position: Sitting   BP Method: Large (Manual)   Pulse: 77   Temp: 98.7 °F (37.1 °C)   TempSrc: Oral   SpO2: 98%   Weight: 117.2 kg (258 lb 6.1 oz)   Height: 5' 9" (1.753 m)     Body mass index is 38.16 kg/m².   ]          Diagnoses and health risks identified today and associated recommendations/orders:  1. Encounter for preventive health examination  Education provided about preventive health examinations and procedures; discussed patient's health concerns, holistically addressed patient's health plan.        2. Congestive heart failure, unspecified HF chronicity, unspecified heart failure type  - CBC auto differential; Future  - Comprehensive metabolic panel; Future  The current medical regimen is effective;  continue present plan and medications.    3. Angina at rest  - CBC auto differential; Future  - Comprehensive metabolic panel; Future  The current medical regimen is effective;  continue present plan and medications.    4. Cardiomyopathy, unspecified type  - CBC auto differential; Future  - Comprehensive metabolic panel; Future  The current medical regimen is effective;  continue present plan and medications.    5. Controlled type 2 diabetes mellitus without complication, without long-term current use of insulin  - CBC auto differential; Future  - Comprehensive metabolic panel; Future  Most recent Ha1c 9.6 (increased from prev. 5.9). Discussed diet, " continued participation in tolerable fitness activities.      6. Severe obesity with body mass index (BMI) of 35.0 to 39.9 with comorbidity  Discussed diet, continued participation in tolerable fitness activities, health risks associated with obesity.       7. Essential hypertension  Discussed sodium restriction, maintaining ideal body weight and regular exercise program as physiologic means to achieve blood pressure control. The patient will strive towards this. The current medical regimen is effective; continue present plan and medications. Recommended patient to check home readings to monitor and see me for followup as scheduled or sooner as needed. Patient was educated that both decongestant and anti-inflammatory medication may raise blood pressure    8. Anxiety  The current medical regimen is effective;  continue present plan and medications.      9. Screening for malignant neoplasm of colon  - Case request GI: COLONOSCOPY    10. Encounter for lipid screening for cardiovascular disease  - Lipid panel; Future      Provided Eyad with a 5-10 year written screening schedule and personal prevention plan. Recommendations were developed using the USPSTF age appropriate recommendations. Education, counseling, and referrals were provided as needed.  After Visit Summary printed and given to patient which includes a list of additional screenings\tests needed.    No follow-ups on file.      Catracho Robledo NP

## 2019-06-27 NOTE — PATIENT INSTRUCTIONS
Counseling and Referral of Other Preventative  (Italic type indicates deductible and co-insurance are waived)    Patient Name: Eyad Rodriguez  Today's Date: 6/27/2019    Health Maintenance       Date Due Completion Date    TETANUS VACCINE 05/18/1975 ---    Aspirin/Antiplatelet Therapy 05/18/1975 ---    Shingles Vaccine (1 of 2) 05/18/2007 ---    Colonoscopy 05/18/2007 ---    Eye Exam 05/23/2018 5/23/2017    Hemoglobin A1c 01/10/2019 7/10/2018    Override on 7/2/2018: Done    Foot Exam 07/02/2019 7/2/2018    Override on 7/2/2018: Done    Lipid Panel 07/02/2019 7/2/2018    Override on 7/2/2018: Done    Influenza Vaccine 08/01/2019 9/5/2018    High Dose Statin 06/27/2020 6/27/2019        No orders of the defined types were placed in this encounter.    The following information is provided to all patients.  This information is to help you find resources for any of the problems found today that may be affecting your health:                Living healthy guide: www.[facility].[place].gov      Understanding Diabetes: www.diabetes.org      Eating healthy: www.cdc.gov/healthyweight      CDC home safety checklist: www.cdc.gov/steadi/patient.html      Agency on Aging: www.goea.[place].gov      Alcoholics anonymous (AA): www.aa.org      Physical Activity: www.aristeo.nih.gov/pt7nosd      Tobacco use: www.quitwithusla.org

## 2019-06-27 NOTE — PROGRESS NOTES
Assessment & Plan  Problem List Items Addressed This Visit        Psychiatric    Anxiety    Relevant Medications    ALPRAZolam (XANAX) 0.5 MG tablet       Orthopedic    Chronic low back pain    Overview     followed by pain management         Relevant Medications    naproxen (NAPROSYN) 500 MG tablet    HYDROcodone-acetaminophen (NORCO) 7.5-325 mg per tablet      Other Visit Diagnoses     Transient insomnia        Relevant Medications    zolpidem (AMBIEN) 10 mg Tab      The current medical regimen is effective;  continue present plan and medications.  Patient was counseled that a recent study showed that the chronic use of anxiolytic medication may increase the risk for developing dementia.      Health Maintenance reviewed    Follow-up: No follow-ups on file.    ______________________________________________________________________    Chief Complaint  Chief Complaint   Patient presents with    Medication Refill       HPI  Eyad Rodriguez is a 64 y.o. male with multiple medical diagnoses as listed in the medical history and problem list that presents for medication refills. Pt is known to me with last appointment 4/2/2019.    Patient denies any new symptoms including chest pain, SOB, blurry vision, N/V, diarrhea.  He presents in the office today for refills of his medication.  He does report he did have 1 episode anxiety.  This did resolve.  Patient does take his medication on a regular basis.      PAST MEDICAL HISTORY:  Past Medical History:   Diagnosis Date    Angina pectoris     Anxiety     BPH (benign prostatic hypertrophy)     Chronic low back pain     followed by pain management    Disorder of kidney and ureter     DJD (degenerative joint disease) of knee     Heart failure     Hemorrhoid     HTN (hypertension)     Insomnia     Obesity     Personal history of kidney stones     Tobacco abuse     Type 2 diabetes mellitus        PAST SURGICAL HISTORY:  Past Surgical History:   Procedure  Laterality Date    COLONOSCOPY N/A 7/10/2015    Performed by Chuck Delacruz MD at [facility] (4TH FLR)    cyst removed from back      ESOPHAGOGASTRODUODENOSCOPY (EGD) N/A 7/10/2015    Performed by Chuck Delacruz MD at [facility] (4TH FLR)       SOCIAL HISTORY:  Social History     Socioeconomic History    Marital status:      Spouse name: Not on file    Number of children: 1    Years of education: Not on file    Highest education level: Not on file   Occupational History    Occupation:    Social Needs    Financial resource strain: Not on file    Food insecurity:     Worry: Not on file     Inability: Not on file    Transportation needs:     Medical: Not on file     Non-medical: Not on file   Tobacco Use    Smoking status: Former Smoker     Types: Cigars    Smokeless tobacco: Current User    Tobacco comment: quit 10 yrs ago   Substance and Sexual Activity    Alcohol use: No     Alcohol/week: 1.0 oz     Types: 2 Standard drinks or equivalent per week    Drug use: No    Sexual activity: Not Currently   Lifestyle    Physical activity:     Days per week: Not on file     Minutes per session: Not on file    Stress: Not on file   Relationships    Social connections:     Talks on phone: Not on file     Gets together: Not on file     Attends Scientology service: Not on file     Active member of club or organization: Not on file     Attends meetings of clubs or organizations: Not on file     Relationship status: Not on file   Other Topics Concern    Not on file   Social History Narrative    Not on file       FAMILY HISTORY:  Family History   Problem Relation Age of Onset    Hypertension Mother     Anxiety disorder Mother     Diverticulosis Mother     Irritable bowel syndrome Mother     Hypertension Father     Anxiety disorder Father     Anxiety disorder Sister     Colon polyps Brother     Colon cancer Neg Hx     Cirrhosis Neg Hx     Liver cancer Neg Hx     Celiac disease Neg  Hx     Crohn's disease Neg Hx     Ulcerative colitis Neg Hx     Esophageal cancer Neg Hx     Stomach cancer Neg Hx     Rectal cancer Neg Hx        ALLERGIES AND MEDICATIONS: updated and reviewed.  Review of patient's allergies indicates:   Allergen Reactions    Codeine      Other reaction(s): Nausea  Other reaction(s): Nausea    Flexeril  [cyclobenzaprine]      Other reaction(s): Nausea     Current Outpatient Medications   Medication Sig Dispense Refill    ALPRAZolam (XANAX) 0.5 MG tablet Take 1 tablet (0.5 mg total) by mouth 3 (three) times daily as needed for Anxiety. 90 tablet 2    alprostadil (MUSE) 250 MCG pellet 1 each (250 mcg total) by Transurethral route as needed for Erectile Dysfunction. use no more than 3 times per week 6 each 1    amLODIPine (NORVASC) 10 MG tablet Take 1 tablet (10 mg total) by mouth once daily. 90 tablet 1    HYDROcodone-acetaminophen (NORCO) 7.5-325 mg per tablet Take 1 tablet by mouth every 6 (six) hours as needed. 30 tablet 0    lisinopril (PRINIVIL,ZESTRIL) 40 MG tablet Take 1 tablet (40 mg total) by mouth once daily. 90 tablet 1    metoprolol succinate (TOPROL-XL) 25 MG 24 hr tablet Take 1 tablet (25 mg total) by mouth once daily. 90 tablet 1    naproxen (NAPROSYN) 500 MG tablet Take 1 tablet (500 mg total) by mouth 2 (two) times daily with meals. 60 tablet 0    rosuvastatin (CRESTOR) 5 MG tablet Take 1 tablet (5 mg total) by mouth once daily. 90 tablet 3    zolpidem (AMBIEN) 10 mg Tab Take 1 tablet (10 mg total) by mouth nightly. 30 tablet 2     No current facility-administered medications for this visit.          ROS  Review of Systems   Constitutional: Negative for activity change, appetite change, fatigue, fever and unexpected weight change.   HENT: Negative for congestion and facial swelling.    Eyes: Negative for visual disturbance.   Respiratory: Negative for chest tightness, shortness of breath, wheezing and stridor.    Cardiovascular: Negative for chest  "pain, palpitations and leg swelling.   Gastrointestinal: Negative for abdominal distention, abdominal pain, constipation, diarrhea, nausea and vomiting.   Endocrine: Negative for cold intolerance, heat intolerance, polydipsia and polyuria.   Musculoskeletal: Positive for arthralgias.   Skin: Negative.    Allergic/Immunologic: Negative.    Neurological: Negative for dizziness, light-headedness, numbness and headaches.   Psychiatric/Behavioral: Positive for agitation. Negative for decreased concentration.           Physical Exam  Vitals:    06/27/19 0946   BP: 136/82   BP Location: Left arm   Patient Position: Sitting   BP Method: Large (Manual)   Pulse: 77   Temp: 98.7 °F (37.1 °C)   TempSrc: Oral   SpO2: 98%   Weight: 117.2 kg (258 lb 6.1 oz)   Height: 5' 9" (1.753 m)    Body mass index is 38.16 kg/m².  Weight: 117.2 kg (258 lb 6.1 oz)   Height: 5' 9" (175.3 cm)   Physical Exam   Constitutional: He is oriented to person, place, and time. He appears well-developed and well-nourished.   HENT:   Head: Normocephalic and atraumatic.   Right Ear: External ear normal.   Left Ear: External ear normal.   Nose: Nose normal.   Mouth/Throat: Oropharynx is clear and moist.   Eyes: Pupils are equal, round, and reactive to light. Conjunctivae and EOM are normal.   Neck: Normal range of motion.   Cardiovascular: Normal rate, regular rhythm and normal heart sounds.   Pulmonary/Chest: Effort normal and breath sounds normal.   Neurological: He is alert and oriented to person, place, and time.   Skin: Skin is warm and dry.   Psychiatric: He has a normal mood and affect. His behavior is normal.   Vitals reviewed.      Health Maintenance       Date Due Completion Date    TETANUS VACCINE 05/18/1975 ---    Aspirin/Antiplatelet Therapy 05/18/1975 ---    Shingles Vaccine (1 of 2) 05/18/2007 ---    Colonoscopy 05/18/2007 ---    Eye Exam 05/23/2018 5/23/2017    Hemoglobin A1c 01/10/2019 7/10/2018    Override on 7/2/2018: Done    Foot Exam " 07/02/2019 7/2/2018    Override on 7/2/2018: Done    Lipid Panel 07/02/2019 7/2/2018    Override on 7/2/2018: Done    Influenza Vaccine 08/01/2019 9/5/2018    High Dose Statin 06/27/2020 6/27/2019              Patient note was created using [employer].  Any errors in syntax or even information may not have been identified and edited on initial review prior to signing this note.

## 2019-07-31 DIAGNOSIS — E11.9 CONTROLLED TYPE 2 DIABETES MELLITUS WITHOUT COMPLICATION, WITHOUT LONG-TERM CURRENT USE OF INSULIN: ICD-10-CM

## 2019-07-31 DIAGNOSIS — I10 ESSENTIAL HYPERTENSION: ICD-10-CM

## 2019-07-31 NOTE — TELEPHONE ENCOUNTER
----- Message from Gary Underwood sent at 7/31/2019 11:45 AM CDT -----  Contact: JENNIFER KUHN [[id number]]  Can the clinic reply in [facility]:N    Please refill the medication(s) listed below.     Please call the patient when the prescription(s) is ready for  at the phone number   [phone]    Medication #1:amLODIPine (NORVASC) 10 MG tablet    Medication #2:lisinopril (PRINIVIL,ZESTRIL) 40 MG tablet      metoprolol succinate (TOPROL-XL) 25 MG 24 hr tablet        Preferred Pharmacy:[facility] [place] [address] [place] [address] [phone] (Phone)  [phone] (Fax)

## 2019-08-01 RX ORDER — AMLODIPINE BESYLATE 10 MG/1
10 TABLET ORAL DAILY
Qty: 90 TABLET | Refills: 1 | Status: SHIPPED | OUTPATIENT
Start: 2019-08-01 | End: 2020-01-28 | Stop reason: SDUPTHER

## 2019-08-01 RX ORDER — ROSUVASTATIN CALCIUM 5 MG/1
5 TABLET, COATED ORAL DAILY
Qty: 90 TABLET | Refills: 3 | Status: SHIPPED | OUTPATIENT
Start: 2019-08-01 | End: 2020-09-10 | Stop reason: SDUPTHER

## 2019-08-01 RX ORDER — METOPROLOL SUCCINATE 25 MG/1
25 TABLET, EXTENDED RELEASE ORAL DAILY
Qty: 90 TABLET | Refills: 1 | Status: SHIPPED | OUTPATIENT
Start: 2019-08-01 | End: 2020-01-28 | Stop reason: SDUPTHER

## 2019-08-01 RX ORDER — LISINOPRIL 40 MG/1
40 TABLET ORAL DAILY
Qty: 90 TABLET | Refills: 1 | Status: SHIPPED | OUTPATIENT
Start: 2019-08-01 | End: 2020-01-28 | Stop reason: SDUPTHER

## 2019-08-01 NOTE — TELEPHONE ENCOUNTER
----- Message from Blanca Waddell sent at 8/1/2019  9:49 AM CDT -----  Contact: Marie perez/[facility]  [phone]  Type: RX Refill Request    Who Called: [facility]    Refill or New Rx: Refill    RX Name and Strength: rosuvastatin (CRESTOR) 5 MG tablet    Is this a 30 day or 90 day RX: 90 day    Preferred Pharmacy with phone number: .  [facility] [place] [facility] [id number] - [place] - [address]  [address]  [place] [id number]  Phone: [phone] Fax: [phone]    Local or Mail Order: Local    Would the patient rather a call back or a response via [facility]? Call back

## 2019-09-11 ENCOUNTER — PATIENT OUTREACH (OUTPATIENT)
Dept: ADMINISTRATIVE | Facility: HOSPITAL | Age: 64
End: 2019-09-11

## 2019-09-25 ENCOUNTER — CLINICAL SUPPORT (OUTPATIENT)
Dept: OPHTHALMOLOGY | Facility: CLINIC | Age: 64
End: 2019-09-25
Attending: FAMILY MEDICINE
Payer: MEDICARE

## 2019-09-25 ENCOUNTER — OFFICE VISIT (OUTPATIENT)
Dept: FAMILY MEDICINE | Facility: CLINIC | Age: 64
End: 2019-09-25
Payer: MEDICARE

## 2019-09-25 VITALS
HEART RATE: 60 BPM | OXYGEN SATURATION: 96 % | TEMPERATURE: 98 F | HEIGHT: 69 IN | BODY MASS INDEX: 35.92 KG/M2 | SYSTOLIC BLOOD PRESSURE: 110 MMHG | DIASTOLIC BLOOD PRESSURE: 70 MMHG | WEIGHT: 242.5 LBS

## 2019-09-25 DIAGNOSIS — Z23 NEED FOR PROPHYLACTIC VACCINATION AND INOCULATION AGAINST INFLUENZA: Primary | ICD-10-CM

## 2019-09-25 DIAGNOSIS — F41.9 ANXIETY: ICD-10-CM

## 2019-09-25 DIAGNOSIS — E11.9 TYPE 2 DIABETES MELLITUS WITHOUT COMPLICATION, UNSPECIFIED WHETHER LONG TERM INSULIN USE: ICD-10-CM

## 2019-09-25 DIAGNOSIS — G89.29 CHRONIC MIDLINE LOW BACK PAIN, WITH SCIATICA PRESENCE UNSPECIFIED: ICD-10-CM

## 2019-09-25 DIAGNOSIS — F51.02 TRANSIENT INSOMNIA: ICD-10-CM

## 2019-09-25 DIAGNOSIS — Z23 NEED FOR TD VACCINE: ICD-10-CM

## 2019-09-25 DIAGNOSIS — I10 ESSENTIAL HYPERTENSION: ICD-10-CM

## 2019-09-25 DIAGNOSIS — E66.01 SEVERE OBESITY WITH BODY MASS INDEX (BMI) OF 35.0 TO 39.9 WITH COMORBIDITY: ICD-10-CM

## 2019-09-25 DIAGNOSIS — E11.9 CONTROLLED TYPE 2 DIABETES MELLITUS WITHOUT COMPLICATION, WITHOUT LONG-TERM CURRENT USE OF INSULIN: ICD-10-CM

## 2019-09-25 DIAGNOSIS — M54.5 CHRONIC MIDLINE LOW BACK PAIN, WITH SCIATICA PRESENCE UNSPECIFIED: ICD-10-CM

## 2019-09-25 PROCEDURE — 3008F PR BODY MASS INDEX (BMI) DOCUMENTED: ICD-10-PCS | Mod: HCNC,CPTII,S$GLB, | Performed by: FAMILY MEDICINE

## 2019-09-25 PROCEDURE — 90686 IIV4 VACC NO PRSV 0.5 ML IM: CPT | Mod: HCNC,S$GLB,, | Performed by: FAMILY MEDICINE

## 2019-09-25 PROCEDURE — 3008F BODY MASS INDEX DOCD: CPT | Mod: HCNC,CPTII,S$GLB, | Performed by: FAMILY MEDICINE

## 2019-09-25 PROCEDURE — 92250 DIABETIC EYE SCREENING PHOTO: ICD-10-PCS | Mod: HCNC,TC,S$GLB, | Performed by: FAMILY MEDICINE

## 2019-09-25 PROCEDURE — 99214 PR OFFICE/OUTPT VISIT, EST, LEVL IV, 30-39 MIN: ICD-10-PCS | Mod: 25,HCNC,S$GLB, | Performed by: FAMILY MEDICINE

## 2019-09-25 PROCEDURE — 3074F SYST BP LT 130 MM HG: CPT | Mod: HCNC,CPTII,S$GLB, | Performed by: FAMILY MEDICINE

## 2019-09-25 PROCEDURE — 92250 DIABETIC EYE SCREENING PHOTO: ICD-10-PCS | Mod: 26,HCNC,S$GLB, | Performed by: OPHTHALMOLOGY

## 2019-09-25 PROCEDURE — 2024F PR 7 FIELD PHOTOS WITH INTERP/ REVIEW: ICD-10-PCS | Mod: HCNC,S$GLB,, | Performed by: FAMILY MEDICINE

## 2019-09-25 PROCEDURE — 90472 IMMUNIZATION ADMIN EACH ADD: CPT | Mod: HCNC,S$GLB,, | Performed by: FAMILY MEDICINE

## 2019-09-25 PROCEDURE — 99214 OFFICE O/P EST MOD 30 MIN: CPT | Mod: 25,HCNC,S$GLB, | Performed by: FAMILY MEDICINE

## 2019-09-25 PROCEDURE — 3046F PR MOST RECENT HEMOGLOBIN A1C LEVEL > 9.0%: ICD-10-PCS | Mod: HCNC,CPTII,S$GLB, | Performed by: FAMILY MEDICINE

## 2019-09-25 PROCEDURE — 90714 TD VACC NO PRESV 7 YRS+ IM: CPT | Mod: HCNC,S$GLB,, | Performed by: FAMILY MEDICINE

## 2019-09-25 PROCEDURE — 90714 PR TD VACCINE 2 PRSRV >/= 7 YO, IM: ICD-10-PCS | Mod: HCNC,S$GLB,, | Performed by: FAMILY MEDICINE

## 2019-09-25 PROCEDURE — 92250 FUNDUS PHOTOGRAPHY W/I&R: CPT | Mod: HCNC,TC,S$GLB, | Performed by: FAMILY MEDICINE

## 2019-09-25 PROCEDURE — 99999 PR PBB SHADOW E&M-EST. PATIENT-LVL III: ICD-10-PCS | Mod: PBBFAC,HCNC,, | Performed by: FAMILY MEDICINE

## 2019-09-25 PROCEDURE — 90686 FLU VACCINE (QUAD) GREATER THAN OR EQUAL TO 3YO PRESERVATIVE FREE IM: ICD-10-PCS | Mod: HCNC,S$GLB,, | Performed by: FAMILY MEDICINE

## 2019-09-25 PROCEDURE — G0008 PR ADMIN INFLUENZA VIRUS VAC: ICD-10-PCS | Mod: HCNC,S$GLB,, | Performed by: FAMILY MEDICINE

## 2019-09-25 PROCEDURE — 99999 PR PBB SHADOW E&M-EST. PATIENT-LVL III: CPT | Mod: PBBFAC,HCNC,, | Performed by: FAMILY MEDICINE

## 2019-09-25 PROCEDURE — G0008 ADMIN INFLUENZA VIRUS VAC: HCPCS | Mod: HCNC,S$GLB,, | Performed by: FAMILY MEDICINE

## 2019-09-25 PROCEDURE — 3078F PR MOST RECENT DIASTOLIC BLOOD PRESSURE < 80 MM HG: ICD-10-PCS | Mod: HCNC,CPTII,S$GLB, | Performed by: FAMILY MEDICINE

## 2019-09-25 PROCEDURE — 2024F 7 FLD RTA PHOTO EVC RTNOPTHY: CPT | Mod: HCNC,S$GLB,, | Performed by: FAMILY MEDICINE

## 2019-09-25 PROCEDURE — 92250 FUNDUS PHOTOGRAPHY W/I&R: CPT | Mod: 26,HCNC,S$GLB, | Performed by: OPHTHALMOLOGY

## 2019-09-25 PROCEDURE — 90472 PR IMMUNIZ,ADMIN,EACH ADDL: ICD-10-PCS | Mod: HCNC,S$GLB,, | Performed by: FAMILY MEDICINE

## 2019-09-25 PROCEDURE — 3046F HEMOGLOBIN A1C LEVEL >9.0%: CPT | Mod: HCNC,CPTII,S$GLB, | Performed by: FAMILY MEDICINE

## 2019-09-25 PROCEDURE — 3074F PR MOST RECENT SYSTOLIC BLOOD PRESSURE < 130 MM HG: ICD-10-PCS | Mod: HCNC,CPTII,S$GLB, | Performed by: FAMILY MEDICINE

## 2019-09-25 PROCEDURE — 3078F DIAST BP <80 MM HG: CPT | Mod: HCNC,CPTII,S$GLB, | Performed by: FAMILY MEDICINE

## 2019-09-25 RX ORDER — HYDROCODONE BITARTRATE AND ACETAMINOPHEN 7.5; 325 MG/1; MG/1
1 TABLET ORAL EVERY 6 HOURS PRN
Qty: 30 TABLET | Refills: 0 | Status: SHIPPED | OUTPATIENT
Start: 2019-09-29 | End: 2019-12-27 | Stop reason: SDUPTHER

## 2019-09-25 RX ORDER — ZOLPIDEM TARTRATE 10 MG/1
10 TABLET ORAL NIGHTLY
Qty: 30 TABLET | Refills: 2 | Status: SHIPPED | OUTPATIENT
Start: 2019-09-29 | End: 2019-12-27 | Stop reason: SDUPTHER

## 2019-09-25 RX ORDER — ALPRAZOLAM 0.5 MG/1
0.5 TABLET ORAL 3 TIMES DAILY PRN
Qty: 90 TABLET | Refills: 2 | Status: SHIPPED | OUTPATIENT
Start: 2019-09-29 | End: 2019-12-27 | Stop reason: SDUPTHER

## 2019-09-25 NOTE — PROGRESS NOTES
Assessment & Plan  Problem List Items Addressed This Visit        Psychiatric    Anxiety    Relevant Medications    ALPRAZolam (XANAX) 0.5 MG tablet       Cardiac/Vascular    Essential hypertension (Chronic)    Current Assessment & Plan     Pt is currently stable on medication regimen.  Continue current therapy as scheduled.  Contact office with any questions about adjustments on medications.            Relevant Orders    Hemoglobin A1c    Comprehensive metabolic panel       Endocrine    Controlled type 2 diabetes mellitus without complication, without long-term current use of insulin    Relevant Orders    Hemoglobin A1c    Comprehensive metabolic panel    Severe obesity with body mass index (BMI) of 35.0 to 39.9 with comorbidity    Current Assessment & Plan     The patient is asked to make an attempt to improve diet and exercise patterns to aid in medical management of this problem.              Orthopedic    Chronic low back pain    Overview     followed by pain management         Relevant Medications    HYDROcodone-acetaminophen (NORCO) 7.5-325 mg per tablet      Other Visit Diagnoses     Need for prophylactic vaccination and inoculation against influenza    -  Primary    Relevant Orders    Influenza - Quadrivalent (PF) (Completed)    Transient insomnia        Relevant Medications    zolpidem (AMBIEN) 10 mg Tab    Need for Td vaccine        Relevant Orders    (In Office Administered) Td Vaccine - Preservative Free (Completed)            Health Maintenance reviewed.    Follow-up: Follow up in about 3 months (around 12/25/2019).    ______________________________________________________________________    Chief Complaint  Chief Complaint   Patient presents with    Hypertension    Medication Refill       HPI  Eyad Rodriguez is a 64 y.o. male with multiple medical diagnoses as listed in the medical history and problem list that presents for medication refills.  Pt is known to me with last appointment  6/27/2019.    Hypertension: The patient reports that they check their blood pressures regularly and blood pressure is generally well controlled (<= 139/89).  The patient  is not enrolled in the digital hypertension program. The patient denies  cardiac chest pain, shortness of breath, lower extremity edema, headaches and side effects of medication. The patient reports problems with  none. The patient  has been compliant with the current medication regimen.  The patient : tries to follow a low salt diet.  He has been traveling recently.  He is doing well.  He denies any side effects with his medication.  He requires refills on his medication.  No recent changes at this time.      PAST MEDICAL HISTORY:  Past Medical History:   Diagnosis Date    Angina pectoris     Anxiety     BPH (benign prostatic hypertrophy)     Chronic low back pain     followed by pain management    Disorder of kidney and ureter     DJD (degenerative joint disease) of knee     Heart failure     Hemorrhoid     HTN (hypertension)     Insomnia     Obesity     Personal history of kidney stones     Tobacco abuse     Type 2 diabetes mellitus        PAST SURGICAL HISTORY:  Past Surgical History:   Procedure Laterality Date    cyst removed from back         SOCIAL HISTORY:  Social History     Socioeconomic History    Marital status:      Spouse name: Not on file    Number of children: 1    Years of education: Not on file    Highest education level: Not on file   Occupational History    Occupation:    Social Needs    Financial resource strain: Not on file    Food insecurity:     Worry: Not on file     Inability: Not on file    Transportation needs:     Medical: Not on file     Non-medical: Not on file   Tobacco Use    Smoking status: Former Smoker     Types: Cigars    Smokeless tobacco: Current User    Tobacco comment: quit 10 yrs ago   Substance and Sexual Activity    Alcohol use: No     Alcohol/week: 1.7  standard drinks     Types: 2 Standard drinks or equivalent per week    Drug use: No    Sexual activity: Not Currently   Lifestyle    Physical activity:     Days per week: Not on file     Minutes per session: Not on file    Stress: Not on file   Relationships    Social connections:     Talks on phone: Not on file     Gets together: Not on file     Attends Jain service: Not on file     Active member of club or organization: Not on file     Attends meetings of clubs or organizations: Not on file     Relationship status: Not on file   Other Topics Concern    Not on file   Social History Narrative    Not on file       FAMILY HISTORY:  Family History   Problem Relation Age of Onset    Hypertension Mother     Anxiety disorder Mother     Diverticulosis Mother     Irritable bowel syndrome Mother     Hypertension Father     Anxiety disorder Father     Anxiety disorder Sister     Colon polyps Brother     Colon cancer Neg Hx     Cirrhosis Neg Hx     Liver cancer Neg Hx     Celiac disease Neg Hx     Crohn's disease Neg Hx     Ulcerative colitis Neg Hx     Esophageal cancer Neg Hx     Stomach cancer Neg Hx     Rectal cancer Neg Hx        ALLERGIES AND MEDICATIONS: updated and reviewed.  Review of patient's allergies indicates:   Allergen Reactions    Codeine      Other reaction(s): Nausea  Other reaction(s): Nausea    Flexeril  [cyclobenzaprine]      Other reaction(s): Nausea     Current Outpatient Medications   Medication Sig Dispense Refill    ALPRAZolam (XANAX) 0.5 MG tablet Take 1 tablet (0.5 mg total) by mouth 3 (three) times daily as needed for Anxiety. 90 tablet 2    amLODIPine (NORVASC) 10 MG tablet Take 1 tablet (10 mg total) by mouth once daily. 90 tablet 1    HYDROcodone-acetaminophen (NORCO) 7.5-325 mg per tablet Take 1 tablet by mouth every 6 (six) hours as needed. 30 tablet 0    lisinopril (PRINIVIL,ZESTRIL) 40 MG tablet Take 1 tablet (40 mg total) by mouth once daily. 90 tablet 1  "   metoprolol succinate (TOPROL-XL) 25 MG 24 hr tablet Take 1 tablet (25 mg total) by mouth once daily. 90 tablet 1    naproxen (NAPROSYN) 500 MG tablet Take 1 tablet (500 mg total) by mouth 2 (two) times daily with meals. 60 tablet 0    rosuvastatin (CRESTOR) 5 MG tablet Take 1 tablet (5 mg total) by mouth once daily. 90 tablet 3    zolpidem (AMBIEN) 10 mg Tab Take 1 tablet (10 mg total) by mouth nightly. 30 tablet 2    alprostadil (MUSE) 250 MCG pellet 1 each (250 mcg total) by Transurethral route as needed for Erectile Dysfunction. use no more than 3 times per week 6 each 1     No current facility-administered medications for this visit.          ROS  Review of Systems   Constitutional: Negative for activity change, appetite change, fatigue, fever and unexpected weight change.   HENT: Negative for congestion and facial swelling.    Eyes: Negative for visual disturbance.   Respiratory: Negative for chest tightness, shortness of breath, wheezing and stridor.    Cardiovascular: Negative for chest pain, palpitations and leg swelling.   Gastrointestinal: Negative for abdominal distention, abdominal pain, constipation, diarrhea, nausea and vomiting.   Endocrine: Negative for cold intolerance, heat intolerance, polydipsia and polyuria.   Skin: Negative.    Allergic/Immunologic: Negative.    Neurological: Negative for dizziness, light-headedness, numbness and headaches.   Psychiatric/Behavioral: Negative for agitation and decreased concentration. The patient is nervous/anxious.            Physical Exam  Vitals:    09/25/19 1034   BP: 110/70   BP Location: Left arm   Patient Position: Sitting   BP Method: Large (Manual)   Pulse: 60   Temp: 98.4 °F (36.9 °C)   TempSrc: Oral   SpO2: 96%   Weight: 110 kg (242 lb 8.1 oz)   Height: 5' 9" (1.753 m)    Body mass index is 35.81 kg/m².  Weight: 110 kg (242 lb 8.1 oz)   Height: 5' 9" (175.3 cm)   Physical Exam   Constitutional: He is oriented to person, place, and time. He " appears well-developed and well-nourished.   HENT:   Head: Normocephalic and atraumatic.   Right Ear: External ear normal.   Left Ear: External ear normal.   Nose: Nose normal.   Mouth/Throat: Oropharynx is clear and moist.   Eyes: Pupils are equal, round, and reactive to light. Conjunctivae and EOM are normal.   Neck: Normal range of motion.   Cardiovascular: Normal rate, regular rhythm and normal heart sounds.   Pulmonary/Chest: Effort normal and breath sounds normal.   Neurological: He is alert and oriented to person, place, and time.   Skin: Skin is warm and dry.   Psychiatric: He has a normal mood and affect. His behavior is normal.   Vitals reviewed.        Health Maintenance       Date Due Completion Date    TETANUS VACCINE 05/18/1975 ---    Aspirin/Antiplatelet Therapy 05/18/1975 ---    Shingles Vaccine (1 of 2) 05/18/2007 ---    Colonoscopy 05/18/2007 ---    Eye Exam 05/23/2018 5/23/2017    Foot Exam 07/02/2019 7/2/2018    Influenza Vaccine (1) 09/01/2019 9/5/2018    Hemoglobin A1c 09/27/2019 6/27/2019    Lipid Panel 06/27/2020 6/27/2019    High Dose Statin 08/01/2020 8/1/2019              Patient note was created using [employer].  Any errors in syntax or even information may not have been identified and edited on initial review prior to signing this note.

## 2019-09-25 NOTE — PROGRESS NOTES
HPI     63 Y/o here for screening for diabetic retinopathy with non-dilated   fundus photos per Dr. Vega     Last edited by Solange Hansen MA on 9/25/2019 11:51 AM. (History)            Assessment /Plan     For exam results, see Encounter Report.    Type 2 diabetes mellitus without complication, unspecified whether long term insulin use  -     Diabetic Eye Screening Photo      Please see Dr. Worthy progress note for interpretation

## 2019-09-27 LAB
LEFT EYE DM RETINOPATHY: NEGATIVE
RIGHT EYE DM RETINOPATHY: NEGATIVE

## 2019-09-30 ENCOUNTER — TELEPHONE (OUTPATIENT)
Dept: OPHTHALMOLOGY | Facility: CLINIC | Age: 64
End: 2019-09-30

## 2019-09-30 NOTE — TELEPHONE ENCOUNTER
Called patient regarding diabetic eye screening results ; gave patient his results    ----- Message from Olya Lange sent at 9/27/2019  4:07 PM CDT -----      ----- Message -----  From: Perico Worthy MD  Sent: 9/27/2019   3:58 PM CDT  To: Eva Lira

## 2019-12-13 ENCOUNTER — PATIENT OUTREACH (OUTPATIENT)
Dept: ADMINISTRATIVE | Facility: HOSPITAL | Age: 64
End: 2019-12-13

## 2019-12-27 ENCOUNTER — LAB VISIT (OUTPATIENT)
Dept: LAB | Facility: HOSPITAL | Age: 64
End: 2019-12-27
Attending: FAMILY MEDICINE
Payer: MEDICARE

## 2019-12-27 ENCOUNTER — OFFICE VISIT (OUTPATIENT)
Dept: FAMILY MEDICINE | Facility: CLINIC | Age: 64
End: 2019-12-27
Payer: MEDICARE

## 2019-12-27 VITALS
OXYGEN SATURATION: 95 % | HEART RATE: 74 BPM | BODY MASS INDEX: 32.56 KG/M2 | HEIGHT: 69 IN | WEIGHT: 219.81 LBS | TEMPERATURE: 98 F | DIASTOLIC BLOOD PRESSURE: 62 MMHG | SYSTOLIC BLOOD PRESSURE: 100 MMHG

## 2019-12-27 DIAGNOSIS — F41.9 ANXIETY: ICD-10-CM

## 2019-12-27 DIAGNOSIS — I10 ESSENTIAL HYPERTENSION: Primary | Chronic | ICD-10-CM

## 2019-12-27 DIAGNOSIS — R91.1 LUNG NODULE: ICD-10-CM

## 2019-12-27 DIAGNOSIS — R63.4 WEIGHT LOSS: ICD-10-CM

## 2019-12-27 DIAGNOSIS — R16.0 LIVER MASS: ICD-10-CM

## 2019-12-27 DIAGNOSIS — F51.02 TRANSIENT INSOMNIA: ICD-10-CM

## 2019-12-27 DIAGNOSIS — N39.43 POST-VOID DRIBBLING: ICD-10-CM

## 2019-12-27 DIAGNOSIS — N28.89 RENAL MASS: ICD-10-CM

## 2019-12-27 DIAGNOSIS — Z12.5 ENCOUNTER FOR SCREENING FOR MALIGNANT NEOPLASM OF PROSTATE: ICD-10-CM

## 2019-12-27 DIAGNOSIS — M54.50 CHRONIC MIDLINE LOW BACK PAIN: ICD-10-CM

## 2019-12-27 DIAGNOSIS — G89.29 CHRONIC MIDLINE LOW BACK PAIN: ICD-10-CM

## 2019-12-27 DIAGNOSIS — E11.9 CONTROLLED TYPE 2 DIABETES MELLITUS WITHOUT COMPLICATION, WITHOUT LONG-TERM CURRENT USE OF INSULIN: ICD-10-CM

## 2019-12-27 DIAGNOSIS — I10 ESSENTIAL HYPERTENSION: ICD-10-CM

## 2019-12-27 DIAGNOSIS — N20.0 NEPHROLITHIASIS: ICD-10-CM

## 2019-12-27 LAB
ALBUMIN SERPL BCP-MCNC: 3.8 G/DL (ref 3.5–5.2)
ALP SERPL-CCNC: 136 U/L (ref 55–135)
ALT SERPL W/O P-5'-P-CCNC: 24 U/L (ref 10–44)
ANION GAP SERPL CALC-SCNC: 13 MMOL/L (ref 8–16)
AST SERPL-CCNC: 23 U/L (ref 10–40)
BILIRUB SERPL-MCNC: 0.8 MG/DL (ref 0.1–1)
BUN SERPL-MCNC: 13 MG/DL (ref 8–23)
CALCIUM SERPL-MCNC: 9.7 MG/DL (ref 8.7–10.5)
CHLORIDE SERPL-SCNC: 95 MMOL/L (ref 95–110)
CO2 SERPL-SCNC: 21 MMOL/L (ref 23–29)
COMPLEXED PSA SERPL-MCNC: 0.48 NG/ML (ref 0–4)
CREAT SERPL-MCNC: 1.5 MG/DL (ref 0.5–1.4)
EST. GFR  (AFRICAN AMERICAN): 56.9 ML/MIN/1.73 M^2
EST. GFR  (NON AFRICAN AMERICAN): 49.2 ML/MIN/1.73 M^2
ESTIMATED AVG GLUCOSE: ABNORMAL MG/DL (ref 68–131)
GLUCOSE SERPL-MCNC: 693 MG/DL (ref 70–110)
HBA1C MFR BLD HPLC: >14 % (ref 4–5.6)
POTASSIUM SERPL-SCNC: 4.7 MMOL/L (ref 3.5–5.1)
PROT SERPL-MCNC: 8.1 G/DL (ref 6–8.4)
SODIUM SERPL-SCNC: 129 MMOL/L (ref 136–145)

## 2019-12-27 PROCEDURE — 99999 PR PBB SHADOW E&M-EST. PATIENT-LVL IV: CPT | Mod: PBBFAC,HCNC,, | Performed by: FAMILY MEDICINE

## 2019-12-27 PROCEDURE — 3074F PR MOST RECENT SYSTOLIC BLOOD PRESSURE < 130 MM HG: ICD-10-PCS | Mod: HCNC,CPTII,S$GLB, | Performed by: FAMILY MEDICINE

## 2019-12-27 PROCEDURE — 3008F PR BODY MASS INDEX (BMI) DOCUMENTED: ICD-10-PCS | Mod: HCNC,CPTII,S$GLB, | Performed by: FAMILY MEDICINE

## 2019-12-27 PROCEDURE — 80053 COMPREHEN METABOLIC PANEL: CPT | Mod: HCNC

## 2019-12-27 PROCEDURE — 84153 ASSAY OF PSA TOTAL: CPT | Mod: HCNC

## 2019-12-27 PROCEDURE — 3078F DIAST BP <80 MM HG: CPT | Mod: HCNC,CPTII,S$GLB, | Performed by: FAMILY MEDICINE

## 2019-12-27 PROCEDURE — 99499 UNLISTED E&M SERVICE: CPT | Mod: HCNC,S$GLB,, | Performed by: FAMILY MEDICINE

## 2019-12-27 PROCEDURE — 3046F HEMOGLOBIN A1C LEVEL >9.0%: CPT | Mod: HCNC,CPTII,S$GLB, | Performed by: FAMILY MEDICINE

## 2019-12-27 PROCEDURE — 99499 RISK ADDL DX/OHS AUDIT: ICD-10-PCS | Mod: HCNC,S$GLB,, | Performed by: FAMILY MEDICINE

## 2019-12-27 PROCEDURE — 3078F PR MOST RECENT DIASTOLIC BLOOD PRESSURE < 80 MM HG: ICD-10-PCS | Mod: HCNC,CPTII,S$GLB, | Performed by: FAMILY MEDICINE

## 2019-12-27 PROCEDURE — 2024F 7 FLD RTA PHOTO EVC RTNOPTHY: CPT | Mod: HCNC,S$GLB,, | Performed by: FAMILY MEDICINE

## 2019-12-27 PROCEDURE — 99999 PR PBB SHADOW E&M-EST. PATIENT-LVL IV: ICD-10-PCS | Mod: PBBFAC,HCNC,, | Performed by: FAMILY MEDICINE

## 2019-12-27 PROCEDURE — 99215 PR OFFICE/OUTPT VISIT, EST, LEVL V, 40-54 MIN: ICD-10-PCS | Mod: HCNC,S$GLB,, | Performed by: FAMILY MEDICINE

## 2019-12-27 PROCEDURE — 3008F BODY MASS INDEX DOCD: CPT | Mod: HCNC,CPTII,S$GLB, | Performed by: FAMILY MEDICINE

## 2019-12-27 PROCEDURE — 3046F PR MOST RECENT HEMOGLOBIN A1C LEVEL > 9.0%: ICD-10-PCS | Mod: HCNC,CPTII,S$GLB, | Performed by: FAMILY MEDICINE

## 2019-12-27 PROCEDURE — 36415 COLL VENOUS BLD VENIPUNCTURE: CPT | Mod: HCNC,PO

## 2019-12-27 PROCEDURE — 2024F PR 7 FIELD PHOTOS WITH INTERP/ REVIEW: ICD-10-PCS | Mod: HCNC,S$GLB,, | Performed by: FAMILY MEDICINE

## 2019-12-27 PROCEDURE — 3074F SYST BP LT 130 MM HG: CPT | Mod: HCNC,CPTII,S$GLB, | Performed by: FAMILY MEDICINE

## 2019-12-27 PROCEDURE — 83036 HEMOGLOBIN GLYCOSYLATED A1C: CPT | Mod: HCNC

## 2019-12-27 PROCEDURE — 99215 OFFICE O/P EST HI 40 MIN: CPT | Mod: HCNC,S$GLB,, | Performed by: FAMILY MEDICINE

## 2019-12-27 RX ORDER — ZOLPIDEM TARTRATE 10 MG/1
10 TABLET ORAL NIGHTLY
Qty: 30 TABLET | Refills: 2 | Status: SHIPPED | OUTPATIENT
Start: 2019-12-27 | End: 2020-03-25 | Stop reason: SDUPTHER

## 2019-12-27 RX ORDER — ALPRAZOLAM 0.5 MG/1
0.5 TABLET ORAL 3 TIMES DAILY PRN
Qty: 90 TABLET | Refills: 2 | Status: SHIPPED | OUTPATIENT
Start: 2019-12-27 | End: 2020-03-25 | Stop reason: SDUPTHER

## 2019-12-27 RX ORDER — HYDROCODONE BITARTRATE AND ACETAMINOPHEN 7.5; 325 MG/1; MG/1
1 TABLET ORAL EVERY 6 HOURS PRN
Qty: 30 TABLET | Refills: 0 | Status: SHIPPED | OUTPATIENT
Start: 2019-12-27 | End: 2020-03-25 | Stop reason: SDUPTHER

## 2019-12-27 NOTE — PROGRESS NOTES
Assessment & Plan  Problem List Items Addressed This Visit        Psychiatric    Anxiety    Relevant Medications    ALPRAZolam (XANAX) 0.5 MG tablet       Cardiac/Vascular    Essential hypertension - Primary (Chronic)       Endocrine    Controlled type 2 diabetes mellitus without complication, without long-term current use of insulin      Other Visit Diagnoses     Post-void dribbling        Relevant Orders    Urinalysis    Urine culture    PSA, Screening    CT Chest Abdomen Pelvis Without Contrast (XPD)    Encounter for screening for malignant neoplasm of prostate         Relevant Orders    PSA, Screening    Transient insomnia        Relevant Medications    zolpidem (AMBIEN) 10 mg Tab    Chronic midline low back pain        Relevant Medications    HYDROcodone-acetaminophen (NORCO) 7.5-325 mg per tablet    Lung nodule        Relevant Orders    CT Chest Abdomen Pelvis Without Contrast (XPD)    Liver mass        Relevant Orders    CT Chest Abdomen Pelvis Without Contrast (XPD)    Renal mass        Relevant Orders    CT Chest Abdomen Pelvis Without Contrast (XPD)    Nephrolithiasis        Relevant Orders    CT Chest Abdomen Pelvis Without Contrast (XPD)    Weight loss        Relevant Orders    CT Chest Abdomen Pelvis Without Contrast (XPD)        Based upon the information provided today I am concerned that the patient may have a malignancy.  I will order a CT scan of the chest abdomen and pelvis.  Patient has noted greater than 20 lb weight loss since September.  Patient has noted increased urinary leakage.  Patient reports he has increased back pain.  Will evaluate for possible metastasis.  Primary concern is prostate as well as his lungs.  I will evaluate for UTI.  I am concerned about nephrolithiasis as well as possible malignancy.  Treat if culture is positive.  Patient has a known renal mass as well as a history of nephrolithiasis.  Patient needs CT evaluation at this point.  Red flag symptoms noted in the  office.    Health Maintenance reviewed.    Follow-up: Follow up in about 3 months (around 3/27/2020).    ______________________________________________________________________    Chief Complaint  Chief Complaint   Patient presents with    Medication Refill    bladder kuldeep       HPI  Eyad Dennis Rodriguez is a 64 y.o. male with multiple medical diagnoses as listed in the medical history and problem list that presents for bladder leakage.  Pt is known to me with last appointment 9/25/2019.    He reports increased urinary leakage.  It began one month ago.  He denies any blood in his urine.    He has a noted greater than 20 lb weight loss.  He has modified his diet.  He has a decreased appetite.  He may not feel like cooking at time.  He has been eating more soup and drinks atkins protein drink.  He has decreased his calorie intake to 100-1500 a day.  Patient today reports that he has a history of a lung nodule as well as a liver nodule.  Patient reports when he was younger he did have a history of nephrolithiasis.  He reports that on his left kidney he has a small tumor.    Chronic anxiety.  Patient presents in the office today in order to get refills on his chronic anxiety medications.  Patient denies any changes in his medication regimen.  Patient takes his medications as instructed.  Patient does not share his medication.  Patient is aware of the side effects of long-term use of benzodiazepines.      PAST MEDICAL HISTORY:  Past Medical History:   Diagnosis Date    Angina pectoris     Anxiety     BPH (benign prostatic hypertrophy)     Chronic low back pain     followed by pain management    Disorder of kidney and ureter     DJD (degenerative joint disease) of knee     Heart failure     Hemorrhoid     HTN (hypertension)     Insomnia     Obesity     Personal history of kidney stones     Tobacco abuse     Type 2 diabetes mellitus        PAST SURGICAL HISTORY:  Past Surgical History:   Procedure  Laterality Date    cyst removed from back         SOCIAL HISTORY:  Social History     Socioeconomic History    Marital status:      Spouse name: Not on file    Number of children: 1    Years of education: Not on file    Highest education level: Not on file   Occupational History    Occupation:    Social Needs    Financial resource strain: Not on file    Food insecurity:     Worry: Not on file     Inability: Not on file    Transportation needs:     Medical: Not on file     Non-medical: Not on file   Tobacco Use    Smoking status: Former Smoker     Types: Cigars    Smokeless tobacco: Current User    Tobacco comment: quit 10 yrs ago   Substance and Sexual Activity    Alcohol use: No     Alcohol/week: 1.7 standard drinks     Types: 2 Standard drinks or equivalent per week    Drug use: No    Sexual activity: Not Currently   Lifestyle    Physical activity:     Days per week: Not on file     Minutes per session: Not on file    Stress: Not on file   Relationships    Social connections:     Talks on phone: Not on file     Gets together: Not on file     Attends Rastafari service: Not on file     Active member of club or organization: Not on file     Attends meetings of clubs or organizations: Not on file     Relationship status: Not on file   Other Topics Concern    Not on file   Social History Narrative    Not on file       FAMILY HISTORY:  Family History   Problem Relation Age of Onset    Hypertension Mother     Anxiety disorder Mother     Diverticulosis Mother     Irritable bowel syndrome Mother     Hypertension Father     Anxiety disorder Father     Anxiety disorder Sister     Colon polyps Brother     Colon cancer Neg Hx     Cirrhosis Neg Hx     Liver cancer Neg Hx     Celiac disease Neg Hx     Crohn's disease Neg Hx     Ulcerative colitis Neg Hx     Esophageal cancer Neg Hx     Stomach cancer Neg Hx     Rectal cancer Neg Hx        ALLERGIES AND MEDICATIONS: updated  and reviewed.  Review of patient's allergies indicates:   Allergen Reactions    Codeine      Other reaction(s): Nausea  Other reaction(s): Nausea    Flexeril  [cyclobenzaprine]      Other reaction(s): Nausea     Current Outpatient Medications   Medication Sig Dispense Refill    amLODIPine (NORVASC) 10 MG tablet Take 1 tablet (10 mg total) by mouth once daily. 90 tablet 1    HYDROcodone-acetaminophen (NORCO) 7.5-325 mg per tablet Take 1 tablet by mouth every 6 (six) hours as needed. 30 tablet 0    lisinopril (PRINIVIL,ZESTRIL) 40 MG tablet Take 1 tablet (40 mg total) by mouth once daily. 90 tablet 1    metoprolol succinate (TOPROL-XL) 25 MG 24 hr tablet Take 1 tablet (25 mg total) by mouth once daily. 90 tablet 1    naproxen (NAPROSYN) 500 MG tablet Take 1 tablet (500 mg total) by mouth 2 (two) times daily with meals. 60 tablet 0    rosuvastatin (CRESTOR) 5 MG tablet Take 1 tablet (5 mg total) by mouth once daily. 90 tablet 3    zolpidem (AMBIEN) 10 mg Tab Take 1 tablet (10 mg total) by mouth nightly. 30 tablet 2    ALPRAZolam (XANAX) 0.5 MG tablet Take 1 tablet (0.5 mg total) by mouth 3 (three) times daily as needed for Anxiety. 90 tablet 2    alprostadil (MUSE) 250 MCG pellet 1 each (250 mcg total) by Transurethral route as needed for Erectile Dysfunction. use no more than 3 times per week 6 each 1     No current facility-administered medications for this visit.          ROS  Review of Systems   Constitutional: Positive for activity change, appetite change, fatigue and unexpected weight change. Negative for fever.   HENT: Negative for congestion and facial swelling.    Eyes: Negative for visual disturbance.   Respiratory: Negative for chest tightness, shortness of breath, wheezing and stridor.    Cardiovascular: Negative for chest pain, palpitations and leg swelling.   Gastrointestinal: Negative for abdominal distention, abdominal pain, constipation, diarrhea, nausea and vomiting.   Endocrine: Positive  "for polyuria. Negative for cold intolerance, heat intolerance and polydipsia.   Genitourinary: Positive for frequency. Negative for decreased urine volume, hematuria and urgency.   Musculoskeletal: Positive for back pain.   Skin: Negative.    Allergic/Immunologic: Negative.    Neurological: Negative for dizziness, light-headedness, numbness and headaches.   Psychiatric/Behavioral: Negative for agitation and decreased concentration.           Physical Exam  Vitals:    12/27/19 0959   BP: 100/62   BP Location: Left arm   Patient Position: Sitting   BP Method: Large (Manual)   Pulse: 74   Temp: 98 °F (36.7 °C)   TempSrc: Oral   SpO2: 95%   Weight: 99.7 kg (219 lb 12.8 oz)   Height: 5' 9" (1.753 m)    Body mass index is 32.46 kg/m².  Weight: 99.7 kg (219 lb 12.8 oz)   Height: 5' 9" (175.3 cm)   Physical Exam   Constitutional: He is oriented to person, place, and time. He appears well-developed and well-nourished.   HENT:   Head: Normocephalic and atraumatic.   Right Ear: External ear normal.   Left Ear: External ear normal.   Nose: Nose normal.   Mouth/Throat: Oropharynx is clear and moist.   Eyes: Pupils are equal, round, and reactive to light. Conjunctivae and EOM are normal.   Neck: Normal range of motion.   Cardiovascular: Normal rate, regular rhythm and normal heart sounds.   Pulmonary/Chest: Effort normal and breath sounds normal.   Neurological: He is alert and oriented to person, place, and time.   Skin: Skin is warm and dry.   Psychiatric: He has a normal mood and affect. His behavior is normal.   Vitals reviewed.        Health Maintenance       Date Due Completion Date    Aspirin/Antiplatelet Therapy 05/18/1975 ---    Colonoscopy 05/18/2007 ---    Foot Exam 07/02/2019 7/2/2018    Hemoglobin A1c 09/27/2019 6/27/2019    Shingles Vaccine (1 of 2) 09/25/2020 (Originally 5/18/2007) ---    Lipid Panel 06/27/2020 6/27/2019    Eye Exam 09/27/2020 9/27/2019    High Dose Statin 10/03/2020 10/3/2019    TETANUS VACCINE " 09/25/2029 9/25/2019              Patient note was created using [employer].  Any errors in syntax or even information may not have been identified and edited on initial review prior to signing this note.

## 2019-12-28 ENCOUNTER — DOCUMENTATION ONLY (OUTPATIENT)
Dept: PRIMARY CARE CLINIC | Facility: CLINIC | Age: 64
End: 2019-12-28

## 2019-12-28 NOTE — PROGRESS NOTES
On Call Note    Called by lab about critical serum glucose of 693. Of note, sodium was 129 and pt w mild SHIRA present w serum creat 1.5 delta 1.2 in June 2019. HgA1c >14.  Strongly rec'd pt have someone drive him to ER or that he call 911 immediately. Pt declined ama, said he was feeling fine and would follow-up with his pcp  Brandon Craft MD

## 2019-12-30 ENCOUNTER — TELEPHONE (OUTPATIENT)
Dept: FAMILY MEDICINE | Facility: CLINIC | Age: 64
End: 2019-12-30

## 2019-12-30 ENCOUNTER — HOSPITAL ENCOUNTER (EMERGENCY)
Facility: HOSPITAL | Age: 64
Discharge: HOME | End: 2019-12-30
Attending: EMERGENCY MEDICINE
Payer: MEDICARE

## 2019-12-30 VITALS
OXYGEN SATURATION: 96 % | DIASTOLIC BLOOD PRESSURE: 58 MMHG | WEIGHT: 218 LBS | RESPIRATION RATE: 20 BRPM | TEMPERATURE: 98 F | SYSTOLIC BLOOD PRESSURE: 132 MMHG | HEART RATE: 67 BPM | BODY MASS INDEX: 32.19 KG/M2

## 2019-12-30 DIAGNOSIS — R73.9 HYPERGLYCEMIA: ICD-10-CM

## 2019-12-30 DIAGNOSIS — E11.9 DIABETES MELLITUS, NEW ONSET: Primary | ICD-10-CM

## 2019-12-30 LAB
ALBUMIN SERPL BCP-MCNC: 3.1 G/DL (ref 3.5–5.2)
ALBUMIN SERPL BCP-MCNC: 3.5 G/DL (ref 3.5–5.2)
ALLENS TEST: ABNORMAL
ALP SERPL-CCNC: 106 U/L (ref 55–135)
ALP SERPL-CCNC: 94 U/L (ref 55–135)
ALT SERPL W/O P-5'-P-CCNC: 13 U/L (ref 10–44)
ALT SERPL W/O P-5'-P-CCNC: 16 U/L (ref 10–44)
ANION GAP SERPL CALC-SCNC: 11 MMOL/L (ref 8–16)
ANION GAP SERPL CALC-SCNC: 6 MMOL/L (ref 8–16)
AST SERPL-CCNC: 12 U/L (ref 10–40)
AST SERPL-CCNC: 16 U/L (ref 10–40)
BILIRUB SERPL-MCNC: 0.6 MG/DL (ref 0.1–1)
BILIRUB SERPL-MCNC: 0.7 MG/DL (ref 0.1–1)
BILIRUBIN, POC UA: NEGATIVE
BLOOD, POC UA: ABNORMAL
BUN SERPL-MCNC: 11 MG/DL (ref 8–23)
BUN SERPL-MCNC: 12 MG/DL (ref 8–23)
CALCIUM SERPL-MCNC: 8.2 MG/DL (ref 8.7–10.5)
CALCIUM SERPL-MCNC: 8.7 MG/DL (ref 8.7–10.5)
CHLORIDE SERPL-SCNC: 100 MMOL/L (ref 95–110)
CHLORIDE SERPL-SCNC: 103 MMOL/L (ref 95–110)
CLARITY, POC UA: CLEAR
CO2 SERPL-SCNC: 21 MMOL/L (ref 23–29)
CO2 SERPL-SCNC: 22 MMOL/L (ref 23–29)
COLOR, POC UA: YELLOW
CREAT SERPL-MCNC: 1 MG/DL (ref 0.5–1.4)
CREAT SERPL-MCNC: 1 MG/DL (ref 0.5–1.4)
EST. GFR  (AFRICAN AMERICAN): >60 ML/MIN/1.73 M^2
EST. GFR  (AFRICAN AMERICAN): >60 ML/MIN/1.73 M^2
EST. GFR  (NON AFRICAN AMERICAN): >60 ML/MIN/1.73 M^2
EST. GFR  (NON AFRICAN AMERICAN): >60 ML/MIN/1.73 M^2
GLUCOSE SERPL-MCNC: 338 MG/DL (ref 70–110)
GLUCOSE SERPL-MCNC: 502 MG/DL (ref 70–110)
GLUCOSE, POC UA: ABNORMAL
HCO3 UR-SCNC: 21.9 MMOL/L (ref 24–28)
KETONES, POC UA: ABNORMAL
LDH SERPL L TO P-CCNC: 1.05 MMOL/L (ref 0.5–2.2)
LEUKOCYTE EST, POC UA: NEGATIVE
NITRITE, POC UA: NEGATIVE
PCO2 BLDA: 41.3 MMHG (ref 35–45)
PH SMN: 7.33 [PH] (ref 7.35–7.45)
PH UR STRIP: 5.5 [PH]
PO2 BLDA: 37 MMHG (ref 40–60)
POC B-TYPE NATRIURETIC PEPTIDE: 40.1 PG/ML (ref 0–100)
POC BE: -4 MMOL/L
POC CARDIAC TROPONIN I: 0 NG/ML
POC PTINR: 1 (ref 0.9–1.2)
POC PTWBT: 11.5 SEC (ref 9.7–14.3)
POC SATURATED O2: 66 % (ref 95–100)
POC TCO2: 23 MMOL/L (ref 24–29)
POCT GLUCOSE: 181 MG/DL (ref 70–110)
POCT GLUCOSE: 254 MG/DL (ref 70–110)
POCT GLUCOSE: 291 MG/DL (ref 70–110)
POCT GLUCOSE: 307 MG/DL (ref 70–110)
POCT GLUCOSE: 363 MG/DL (ref 70–110)
POCT GLUCOSE: 375 MG/DL (ref 70–110)
POTASSIUM SERPL-SCNC: 4.1 MMOL/L (ref 3.5–5.1)
POTASSIUM SERPL-SCNC: 4.2 MMOL/L (ref 3.5–5.1)
PROT SERPL-MCNC: 6.7 G/DL (ref 6–8.4)
PROT SERPL-MCNC: 7.5 G/DL (ref 6–8.4)
PROTEIN, POC UA: NEGATIVE
SAMPLE: ABNORMAL
SAMPLE: NORMAL
SAMPLE: NORMAL
SITE: ABNORMAL
SODIUM SERPL-SCNC: 128 MMOL/L (ref 136–145)
SODIUM SERPL-SCNC: 135 MMOL/L (ref 136–145)
SPECIFIC GRAVITY, POC UA: 1.02
UROBILINOGEN, POC UA: 0.2 E.U./DL

## 2019-12-30 PROCEDURE — 96361 HYDRATE IV INFUSION ADD-ON: CPT | Mod: HCNC,ER

## 2019-12-30 PROCEDURE — 63600175 PHARM REV CODE 636 W HCPCS: Mod: HCNC,ER | Performed by: EMERGENCY MEDICINE

## 2019-12-30 PROCEDURE — 25000003 PHARM REV CODE 250: Mod: HCNC,ER | Performed by: EMERGENCY MEDICINE

## 2019-12-30 PROCEDURE — 93010 ELECTROCARDIOGRAM REPORT: CPT | Mod: HCNC,,, | Performed by: INTERNAL MEDICINE

## 2019-12-30 PROCEDURE — 93010 EKG 12-LEAD: ICD-10-PCS | Mod: HCNC,,, | Performed by: INTERNAL MEDICINE

## 2019-12-30 PROCEDURE — 84484 ASSAY OF TROPONIN QUANT: CPT | Mod: HCNC,ER

## 2019-12-30 PROCEDURE — 82803 BLOOD GASES ANY COMBINATION: CPT | Mod: HCNC,ER

## 2019-12-30 PROCEDURE — 96374 THER/PROPH/DIAG INJ IV PUSH: CPT | Mod: HCNC,ER

## 2019-12-30 PROCEDURE — 80053 COMPREHEN METABOLIC PANEL: CPT | Mod: HCNC,ER

## 2019-12-30 PROCEDURE — 80053 COMPREHEN METABOLIC PANEL: CPT | Mod: 91,HCNC

## 2019-12-30 PROCEDURE — 81003 URINALYSIS AUTO W/O SCOPE: CPT | Mod: HCNC,ER

## 2019-12-30 PROCEDURE — 85610 PROTHROMBIN TIME: CPT | Mod: HCNC,ER

## 2019-12-30 PROCEDURE — 93005 ELECTROCARDIOGRAM TRACING: CPT | Mod: HCNC,ER

## 2019-12-30 PROCEDURE — 99291 CRITICAL CARE FIRST HOUR: CPT | Mod: 25,HCNC,ER

## 2019-12-30 PROCEDURE — 83880 ASSAY OF NATRIURETIC PEPTIDE: CPT | Mod: HCNC,ER

## 2019-12-30 PROCEDURE — 96372 THER/PROPH/DIAG INJ SC/IM: CPT | Mod: 59,HCNC,ER

## 2019-12-30 PROCEDURE — 85025 COMPLETE CBC W/AUTO DIFF WBC: CPT | Mod: HCNC,ER

## 2019-12-30 RX ORDER — POTASSIUM CHLORIDE 20 MEQ/1
40 TABLET, EXTENDED RELEASE ORAL
Status: COMPLETED | OUTPATIENT
Start: 2019-12-30 | End: 2019-12-30

## 2019-12-30 RX ORDER — METFORMIN HYDROCHLORIDE 1000 MG/1
500 TABLET ORAL 2 TIMES DAILY WITH MEALS
Qty: 30 TABLET | Refills: 0 | Status: SHIPPED | OUTPATIENT
Start: 2019-12-30 | End: 2020-01-27 | Stop reason: SDUPTHER

## 2019-12-30 RX ORDER — INSULIN PUMP SYRINGE, 3 ML
EACH MISCELLANEOUS
Qty: 1 EACH | Refills: 0 | Status: SHIPPED | OUTPATIENT
Start: 2019-12-30 | End: 2020-12-29

## 2019-12-30 RX ORDER — ONDANSETRON 4 MG/1
4 TABLET, FILM COATED ORAL EVERY 6 HOURS PRN
Qty: 30 TABLET | Refills: 0 | Status: SHIPPED | OUTPATIENT
Start: 2019-12-30 | End: 2020-06-26

## 2019-12-30 RX ADMIN — INSULIN HUMAN 5 UNITS: 100 INJECTION, SOLUTION PARENTERAL at 02:12

## 2019-12-30 RX ADMIN — SODIUM CHLORIDE 1000 ML: 0.9 INJECTION, SOLUTION INTRAVENOUS at 11:12

## 2019-12-30 RX ADMIN — POTASSIUM CHLORIDE 40 MEQ: 1500 TABLET, EXTENDED RELEASE ORAL at 02:12

## 2019-12-30 RX ADMIN — SODIUM CHLORIDE 1000 ML: 0.9 INJECTION, SOLUTION INTRAVENOUS at 12:12

## 2019-12-30 NOTE — TELEPHONE ENCOUNTER
----- Message from Nafisa Vega MD sent at 12/30/2019  9:00 AM CST -----  Please contact patient.  He needs to go to the emergency room.  He can die with his elevated blood sugar levels.  He needs to be evaluated now.

## 2019-12-30 NOTE — TELEPHONE ENCOUNTER
Spoke with patient he said that someone called him over the weekend but said to him to go to the ED but he refused said that he has been eating a lot of sweets and drinking chocolate milkbut since than he has stopped the sweets drinking water and coffee. Said that he is going to see about getting his blood sugar checked today if it is still high he will go to the ED.

## 2019-12-30 NOTE — ED PROVIDER NOTES
"Encounter Date: 12/30/2019    SCRIBE #1 NOTE: I, Dorene Storey, am scribing for, and in the presence of,  Dr. Manley. I have scribed the following portions of the note - Other sections scribed: HPI, ROS, PE.       History     Chief Complaint   Patient presents with    Hyperglycemia     Pt states," I was called and told that I have a 700 sugar and I need to go to the ER."     Eyad Rodriguez is a 64 y.o. male who presents to the ED complaining of high blood glucose levels. Reports he had glucose level of 700 on 3 days ago and was told to go to ER, but was busy over the weekend so he did not come in until today.   Patient reports being a borderline diabetic.  Patient states he has been eating a lot of sweets over the holiday.  Patient has no complaints at this time    The history is provided by the patient. No  was used.     Review of patient's allergies indicates:   Allergen Reactions    Codeine      Other reaction(s): Nausea  Other reaction(s): Nausea    Flexeril  [cyclobenzaprine]      Other reaction(s): Nausea     Past Medical History:   Diagnosis Date    Angina pectoris     Anxiety     BPH (benign prostatic hypertrophy)     Chronic low back pain     followed by pain management    Disorder of kidney and ureter     DJD (degenerative joint disease) of knee     Heart failure     Hemorrhoid     HTN (hypertension)     Insomnia     Obesity     Personal history of kidney stones     Tobacco abuse     Type 2 diabetes mellitus      Past Surgical History:   Procedure Laterality Date    cyst removed from back       Family History   Problem Relation Age of Onset    Hypertension Mother     Anxiety disorder Mother     Diverticulosis Mother     Irritable bowel syndrome Mother     Hypertension Father     Anxiety disorder Father     Anxiety disorder Sister     Colon polyps Brother     Colon cancer Neg Hx     Cirrhosis Neg Hx     Liver cancer Neg Hx     Celiac disease Neg Hx  "    Crohn's disease Neg Hx     Ulcerative colitis Neg Hx     Esophageal cancer Neg Hx     Stomach cancer Neg Hx     Rectal cancer Neg Hx      Social History     Tobacco Use    Smoking status: Former Smoker     Types: Cigars    Smokeless tobacco: Current User    Tobacco comment: quit 10 yrs ago   Substance Use Topics    Alcohol use: No     Alcohol/week: 1.7 standard drinks     Types: 2 Standard drinks or equivalent per week    Drug use: No     Review of Systems   Constitutional: Negative for fever.   HENT: Negative for sore throat.    Respiratory: Negative for shortness of breath.    Cardiovascular: Negative for chest pain.   Gastrointestinal: Negative for nausea.   Endocrine: Negative for polydipsia, polyphagia and polyuria.   Genitourinary: Negative for dysuria.   Musculoskeletal: Negative for back pain.   Skin: Negative for rash.   Neurological: Negative for weakness.   Hematological: Does not bruise/bleed easily.   All other systems reviewed and are negative.      Physical Exam     Initial Vitals [12/30/19 1102]   BP Pulse Resp Temp SpO2   (!) 171/72 70 16 98 °F (36.7 °C) 96 %      MAP       --         Patient gave consent to have physical exam performed.    Physical Exam    Nursing note and vitals reviewed.  Constitutional: He appears well-developed and well-nourished.   HENT:   Head: Normocephalic and atraumatic.   Right Ear: External ear normal.   Left Ear: External ear normal.   Nose: Nose normal.   Mouth/Throat: Oropharynx is clear and moist.   Eyes: Conjunctivae and EOM are normal. Pupils are equal, round, and reactive to light.   Neck: Normal range of motion. Neck supple.   Cardiovascular: Normal rate, regular rhythm, normal heart sounds and intact distal pulses. Exam reveals no gallop and no friction rub.    No murmur heard.  Pulmonary/Chest: Breath sounds normal. No stridor. No respiratory distress. He has no wheezes. He has no rhonchi. He has no rales. He exhibits no tenderness.   Abdominal:  Soft. Bowel sounds are normal. He exhibits no distension and no mass. There is no tenderness. There is no rigidity, no rebound and no guarding.   Musculoskeletal: Normal range of motion.   Neurological: He is alert and oriented to person, place, and time. No cranial nerve deficit or sensory deficit. GCS score is 15. GCS eye subscore is 4. GCS verbal subscore is 5. GCS motor subscore is 6.   Skin: Skin is warm and dry. Capillary refill takes less than 2 seconds. No rash noted.   Psychiatric: He has a normal mood and affect. His behavior is normal.         ED Course   Critical Care  Date/Time: 12/30/2019 1:04 PM  Performed by: Mara Manley DO  Authorized by: Mara Manley DO   Direct patient critical care time: 8 minutes  Additional history critical care time: 8 minutes  Ordering / reviewing critical care time: 8 minutes  Documentation critical care time: 8 minutes  Total critical care time (exclusive of procedural time) : 32 minutes  Critical care was necessary to treat or prevent imminent or life-threatening deterioration of the following conditions: metabolic crisis, renal failure and dehydration.  Critical care was time spent personally by me on the following activities: evaluation of patient's response to treatment, examination of patient, obtaining history from patient or surrogate, ordering and performing treatments and interventions, ordering and review of laboratory studies, ordering and review of radiographic studies, pulse oximetry and re-evaluation of patient's condition.        Labs Reviewed   POCT URINALYSIS W/O SCOPE - Abnormal; Notable for the following components:       Result Value    Glucose, UA 3+ (*)     Ketones, UA 2+ (*)     Blood, UA Trace-intact (*)     All other components within normal limits   POCT GLUCOSE - Abnormal; Notable for the following components:    POCT Glucose 363 (*)     All other components within normal limits   ISTAT PROCEDURE - Abnormal; Notable for the following components:     POC PH 7.332 (*)     POC PO2 37 (*)     POC HCO3 21.9 (*)     POC SATURATED O2 66 (*)     POC TCO2 23 (*)     All other components within normal limits   POCT GLUCOSE - Abnormal; Notable for the following components:    POCT Glucose 375 (*)     All other components within normal limits   TROPONIN ISTAT   COMPREHENSIVE METABOLIC PANEL   POCT CBC   POCT URINALYSIS W/O SCOPE   POCT GLUCOSE MONITORING CONTINUOUS   POCT CMP   POCT PROTIME-INR   POCT TROPONIN   POCT B-TYPE NATRIURETIC PEPTIDE (BNP)   ISTAT PROCEDURE   POCT B-TYPE NATRIURETIC PEPTIDE (BNP)         EKG Readings: (Independently Interpreted)   Initial Reading: No STEMI. Previous EKG: Compared with most recent EKG Previous EKG Date: When compared to prior EKG done on 03/12/2015 rate remains the same at 71 beats per minute. Rhythm: Normal Sinus Rhythm. Heart Rate: Seventy-one. Axis: Normal. Clinical Impression: Normal Sinus Rhythm Other Impression: Normal EKG, .       Imaging Results          X-Ray Chest PA And Lateral (Final result)  Result time 12/30/19 12:45:10    Final result by Crow Lyman MD (12/30/19 12:45:10)                 Impression:      1. Mildly prominent ir stool attenuation could reflect early changes of congestion however are suspected to be on the basis of shallow inspiratory effort.  No large focal consolidation.  Correlation advised.      Electronically signed by: Crow Lyman MD  Date:    12/30/2019  Time:    12:45             Narrative:    EXAMINATION:  XR CHEST PA AND LATERAL    CLINICAL HISTORY:  Hypertension;    TECHNIQUE:  PA and lateral views of the chest were performed.    COMPARISON:  None    FINDINGS:  The cardiomediastinal silhouette is prominent, comparison with previous exams would be helpful..  There is no pleural effusion.  The trachea is midline.  The lungs are symmetrically expanded bilaterally mildly coarse interstitial attenuation projected over the bilateral lower lung zones..  No large focal  consolidation seen.  There is no pneumothorax.  The osseous structures are remarkable for degenerative change..                                 Medical Decision Making:   History:   Old Medical Records: I decided to obtain old medical records.  Clinical Tests:   Lab Tests: Ordered and Reviewed  Chief complaint: high glucose  Differential diagnosis:  Hyperglycemia, DKA, dehydration, new onset diabetes, electrolyte imbalance, urinary tract infection, and lactic acidosis.    Treatment in the ED; PE, IV fluids.   Patient reports feeling better after treatment in the ER.      Ordered glucose  Discussed treatment, prescriptions, and lab result.  Attempted to Contact PCP, however per clinic physician is not available or reachable at 4:40pm.  Left message for physician and nurse Detailing  labs, management, and anticipated D/C treatment, including initiating insulin treatment. Patient needs 1 day follow up.  Fill and take prescriptions as directed.  Patient remains symptom free.  Tolerating PO without difficulty.   Return to the ED if symptoms worsen or do not resolve.   Answered questions and discussed discharge plan.    Patient feels better and is ready for discharge.  Follow up with PCP/specialist in 1 day.            Scribe Attestation:   Scribe #1: I performed the above scribed service and the documentation accurately describes the services I performed. I attest to the accuracy of the note.     I, Dr. Mara Manley, personally performed the services described in this documentation. This document was produced by a scribe under my direction and in my presence. All medical record entries made by the scribe were at my direction and in my presence.  I have reviewed the chart and agree that the record reflects my personal performance and is accurate and complete. Mara Manley, .     12/30/2019 1:04 PM                        Clinical Impression:     1. Diabetes mellitus, new onset    2. Hyperglycemia                                 Mara Manley, DO  12/31/19 1828

## 2019-12-30 NOTE — ED NOTES
"CONTACTED [facility] FOR STATUS ON CMP; STAFF STATES THEY HAVE NOT RECEIVED THE SPECIMEN YET, BUT WILL "INVESTIGATE" AND GET BACK WITH US; MD NOTIFIED OF DELAY  "

## 2019-12-30 NOTE — TELEPHONE ENCOUNTER
----- Message from Azra Colin sent at 12/30/2019 10:09 AM CST -----  Type:  Patient Returning Call    Who Called: pt     Who Left Message for Patient:  Ms. Lehman    Does the patient know what this is regarding?:    Would the patient rather a call back or a response via [facility]? Call back     Best Call Back Number: [phone]    Additional Information:

## 2019-12-31 ENCOUNTER — TELEPHONE (OUTPATIENT)
Dept: FAMILY MEDICINE | Facility: CLINIC | Age: 64
End: 2019-12-31

## 2019-12-31 NOTE — DISCHARGE INSTRUCTIONS
Please initiate your insulin 5 units every evening.  Every morning take her glucose and record the value.  If your morning glucose is above 200 please increase your nightly dose by 2 units every night.  If your morning glucose is below 120 please decreased your dosage by 2 units.  Check your glucose before breakfast lunch and dinner and record these values.

## 2019-12-31 NOTE — TELEPHONE ENCOUNTER
Called patient and no answer a message was left for him to callus back at the clinic regarding scheduling a appointment for ER follow up. He can be scheduled with Dr. Vega's NP Ren, since his provider has nothing available within the next couple of days.

## 2020-01-02 ENCOUNTER — NURSE TRIAGE (OUTPATIENT)
Dept: ADMINISTRATIVE | Facility: CLINIC | Age: 65
End: 2020-01-02

## 2020-01-02 ENCOUNTER — TELEPHONE (OUTPATIENT)
Dept: FAMILY MEDICINE | Facility: CLINIC | Age: 65
End: 2020-01-02

## 2020-01-02 NOTE — TELEPHONE ENCOUNTER
Pt was calling to make his follow up appointment. Something happened with patients phone and the call was disconnected. Pt was called twice and left message for patient to call back.     Reason for Disposition   Requesting regular office appointment    Protocols used: INFORMATION ONLY CALL-A-AH

## 2020-01-02 NOTE — TELEPHONE ENCOUNTER
----- Message from Hollis Oden sent at 1/2/2020  8:35 AM CST -----  Contact: Self  Type: Patient Call Back    Who called:Self    What is the request in detail: He was seen at ED high blood sugar (700 level) and now he needs to be seen sooner than 01/21/2019    Can the clinic reply by [facility]? No    Would the patient rather a call back or a response via [facility]? Call    Best call back number: [phone]    Additional Information:n/a

## 2020-01-02 NOTE — TELEPHONE ENCOUNTER
Pt. Was seen in ED on 12/30/2019 for 700 blood glucose level. Was treated and released when blood sugar came down to 184 was given Metformin 1000 mg BID, and Levemir 5 units nightly. On 12/31/2019 blood sugars ranged from 317-416. 1/1/2020  326 -396 during the day and laast night was 234 and this morning 1/2/2020 blood sugar was 291. Pt. Reports feeling better, no other symptom. First available hospital follow up appt. Was 1/21/2020, appt. Was scheduled is this ok. Pt. Only wanted to see you.

## 2020-01-06 ENCOUNTER — TELEPHONE (OUTPATIENT)
Dept: FAMILY MEDICINE | Facility: CLINIC | Age: 65
End: 2020-01-06

## 2020-01-06 NOTE — TELEPHONE ENCOUNTER
Voicemail message left for patient to call this office regarding message below. Last office visit 12/27/19/ up coming visit 01/21/2020. Please advise

## 2020-01-06 NOTE — TELEPHONE ENCOUNTER
----- Message from Kate Elkinsaux sent at 1/6/2020  9:27 AM CST -----  Contact: Self  Type: Patient Call Back    Who called: Self  What is the request in detail: patient is having cloudy vision he would like to know should he be seen by PCP or Ophthalmology? He stated his sugar was in the 700's on recent  ED visit and this may be the reason for this new onset. Please advise    Can the clinic reply by [facility]? No     Would the patient rather a call back or a response via [facility]?  Call     Best call back number: [phone]

## 2020-01-06 NOTE — TELEPHONE ENCOUNTER
New blurry vision is related to his high blood glucose levels.  He will see me soon.  He needs to take his medication as instructed.

## 2020-01-21 ENCOUNTER — OFFICE VISIT (OUTPATIENT)
Dept: FAMILY MEDICINE | Facility: CLINIC | Age: 65
End: 2020-01-21
Payer: MEDICARE

## 2020-01-21 VITALS
DIASTOLIC BLOOD PRESSURE: 60 MMHG | SYSTOLIC BLOOD PRESSURE: 90 MMHG | BODY MASS INDEX: 33.31 KG/M2 | OXYGEN SATURATION: 96 % | HEIGHT: 69 IN | WEIGHT: 224.88 LBS | TEMPERATURE: 98 F | HEART RATE: 66 BPM

## 2020-01-21 DIAGNOSIS — E11.9 CONTROLLED TYPE 2 DIABETES MELLITUS WITHOUT COMPLICATION, WITHOUT LONG-TERM CURRENT USE OF INSULIN: Primary | ICD-10-CM

## 2020-01-21 PROCEDURE — 3074F SYST BP LT 130 MM HG: CPT | Mod: HCNC,CPTII,S$GLB, | Performed by: FAMILY MEDICINE

## 2020-01-21 PROCEDURE — 3008F PR BODY MASS INDEX (BMI) DOCUMENTED: ICD-10-PCS | Mod: HCNC,CPTII,S$GLB, | Performed by: FAMILY MEDICINE

## 2020-01-21 PROCEDURE — 3008F BODY MASS INDEX DOCD: CPT | Mod: HCNC,CPTII,S$GLB, | Performed by: FAMILY MEDICINE

## 2020-01-21 PROCEDURE — 3046F HEMOGLOBIN A1C LEVEL >9.0%: CPT | Mod: HCNC,CPTII,S$GLB, | Performed by: FAMILY MEDICINE

## 2020-01-21 PROCEDURE — 3078F PR MOST RECENT DIASTOLIC BLOOD PRESSURE < 80 MM HG: ICD-10-PCS | Mod: HCNC,CPTII,S$GLB, | Performed by: FAMILY MEDICINE

## 2020-01-21 PROCEDURE — 3078F DIAST BP <80 MM HG: CPT | Mod: HCNC,CPTII,S$GLB, | Performed by: FAMILY MEDICINE

## 2020-01-21 PROCEDURE — 99214 PR OFFICE/OUTPT VISIT, EST, LEVL IV, 30-39 MIN: ICD-10-PCS | Mod: HCNC,S$GLB,, | Performed by: FAMILY MEDICINE

## 2020-01-21 PROCEDURE — 99214 OFFICE O/P EST MOD 30 MIN: CPT | Mod: HCNC,S$GLB,, | Performed by: FAMILY MEDICINE

## 2020-01-21 PROCEDURE — 99999 PR PBB SHADOW E&M-EST. PATIENT-LVL III: ICD-10-PCS | Mod: PBBFAC,HCNC,, | Performed by: FAMILY MEDICINE

## 2020-01-21 PROCEDURE — 99999 PR PBB SHADOW E&M-EST. PATIENT-LVL III: CPT | Mod: PBBFAC,HCNC,, | Performed by: FAMILY MEDICINE

## 2020-01-21 PROCEDURE — 3046F PR MOST RECENT HEMOGLOBIN A1C LEVEL > 9.0%: ICD-10-PCS | Mod: HCNC,CPTII,S$GLB, | Performed by: FAMILY MEDICINE

## 2020-01-21 PROCEDURE — 3074F PR MOST RECENT SYSTOLIC BLOOD PRESSURE < 130 MM HG: ICD-10-PCS | Mod: HCNC,CPTII,S$GLB, | Performed by: FAMILY MEDICINE

## 2020-01-21 RX ORDER — LANCETS
EACH MISCELLANEOUS
Qty: 300 EACH | Refills: 0 | Status: SHIPPED | OUTPATIENT
Start: 2020-01-21 | End: 2020-03-25 | Stop reason: SDUPTHER

## 2020-01-21 RX ORDER — PEN NEEDLE, DIABETIC 30 GX3/16"
1 NEEDLE, DISPOSABLE MISCELLANEOUS NIGHTLY
Qty: 200 EACH | Refills: 3 | Status: SHIPPED | OUTPATIENT
Start: 2020-01-21 | End: 2020-03-25 | Stop reason: SDUPTHER

## 2020-01-21 NOTE — PROGRESS NOTES
"  Assessment & Plan  Problem List Items Addressed This Visit        Endocrine    Controlled type 2 diabetes mellitus without complication, without long-term current use of insulin - Primary    Relevant Medications    flash glucose sensor (FREESTYLE BRIAN 10 DAY SENSOR) Kit    flash glucose scanning reader (FREESTYLE BRIAN 10 DAY READER) Misc    insulin detemir U-100 (LEVEMIR FLEXTOUCH U-100 INSULN) 100 unit/mL (3 mL) SubQ InPn pen    lancets Misc    blood sugar diagnostic Strp    pen needle, diabetic 32 gauge x 5/32" Ndle       It is medically necessary for the patient to utilize freestyle brian.  Patient does monitor his blood sugars 3 times a day.  Patient does take insulin as instructed.      Health Maintenance reviewed.    Follow-up: No follow-ups on file.    ______________________________________________________________________    Chief Complaint  Chief Complaint   Patient presents with    Hospital Follow Up       HPI  Eyad Rodriguez is a 64 y.o. male with multiple medical diagnoses as listed in the medical history and problem list that presents for hospital follow up.  Pt is known to me with last appointment 12/27/2019.      Diabetes:  He currently monitors his blood glucose 6 times a day.  Patient denies any new symptoms including chest pain, SOB, blurry vision, N/V, diarrhea. He denies any hypoglycemic episodes.  He has lost some vision.  He knows this is related to his blood glucose levels.  He is make good choices for his diet.  He has focused on a lower carb diet.  He is trying to keep his carbohydrate load lower.  He will avoid foods that elevated      PAST MEDICAL HISTORY:  Past Medical History:   Diagnosis Date    Angina pectoris     Anxiety     BPH (benign prostatic hypertrophy)     Chronic low back pain     followed by pain management    Disorder of kidney and ureter     DJD (degenerative joint disease) of knee     Heart failure     Hemorrhoid     HTN (hypertension)     Insomnia     " Obesity     Personal history of kidney stones     Tobacco abuse     Type 2 diabetes mellitus        PAST SURGICAL HISTORY:  Past Surgical History:   Procedure Laterality Date    cyst removed from back         SOCIAL HISTORY:  Social History     Socioeconomic History    Marital status:      Spouse name: Not on file    Number of children: 1    Years of education: Not on file    Highest education level: Not on file   Occupational History    Occupation:    Social Needs    Financial resource strain: Not on file    Food insecurity:     Worry: Not on file     Inability: Not on file    Transportation needs:     Medical: Not on file     Non-medical: Not on file   Tobacco Use    Smoking status: Former Smoker     Types: Cigars    Smokeless tobacco: Current User    Tobacco comment: quit 10 yrs ago   Substance and Sexual Activity    Alcohol use: No     Alcohol/week: 1.7 standard drinks     Types: 2 Standard drinks or equivalent per week    Drug use: No    Sexual activity: Not Currently   Lifestyle    Physical activity:     Days per week: Not on file     Minutes per session: Not on file    Stress: Not on file   Relationships    Social connections:     Talks on phone: Not on file     Gets together: Not on file     Attends Christianity service: Not on file     Active member of club or organization: Not on file     Attends meetings of clubs or organizations: Not on file     Relationship status: Not on file   Other Topics Concern    Not on file   Social History Narrative    Not on file       FAMILY HISTORY:  Family History   Problem Relation Age of Onset    Hypertension Mother     Anxiety disorder Mother     Diverticulosis Mother     Irritable bowel syndrome Mother     Hypertension Father     Anxiety disorder Father     Anxiety disorder Sister     Colon polyps Brother     Colon cancer Neg Hx     Cirrhosis Neg Hx     Liver cancer Neg Hx     Celiac disease Neg Hx     Crohn's disease  Neg Hx     Ulcerative colitis Neg Hx     Esophageal cancer Neg Hx     Stomach cancer Neg Hx     Rectal cancer Neg Hx        ALLERGIES AND MEDICATIONS: updated and reviewed.  Review of patient's allergies indicates:   Allergen Reactions    Codeine      Other reaction(s): Nausea  Other reaction(s): Nausea    Flexeril  [cyclobenzaprine]      Other reaction(s): Nausea     Current Outpatient Medications   Medication Sig Dispense Refill    ALPRAZolam (XANAX) 0.5 MG tablet Take 1 tablet (0.5 mg total) by mouth 3 (three) times daily as needed for Anxiety. 90 tablet 2    blood-glucose meter (FREESTYLE SYSTEM KIT) kit Use as instructed 1 each 0    HYDROcodone-acetaminophen (NORCO) 7.5-325 mg per tablet Take 1 tablet by mouth every 6 (six) hours as needed. 30 tablet 0    insulin detemir U-100 (LEVEMIR FLEXTOUCH U-100 INSULN) 100 unit/mL (3 mL) SubQ InPn pen Inject 5 Units into the skin every evening. 1.5 mL 0    naproxen (NAPROSYN) 500 MG tablet Take 1 tablet (500 mg total) by mouth 2 (two) times daily with meals. 60 tablet 0    ondansetron (ZOFRAN) 4 MG tablet Take 1 tablet (4 mg total) by mouth every 6 (six) hours as needed for Nausea. 30 tablet 0    rosuvastatin (CRESTOR) 5 MG tablet Take 1 tablet (5 mg total) by mouth once daily. 90 tablet 3    zolpidem (AMBIEN) 10 mg Tab Take 1 tablet (10 mg total) by mouth nightly. 30 tablet 2    alprostadil (MUSE) 250 MCG pellet 1 each (250 mcg total) by Transurethral route as needed for Erectile Dysfunction. use no more than 3 times per week 6 each 1    amLODIPine (NORVASC) 10 MG tablet Take 1 tablet (10 mg total) by mouth once daily. 90 tablet 1    blood sugar diagnostic Strp To check BG 3 times daily, to use with insurance preferred meter 300 each 0    flash glucose scanning reader (FREESTYLE BRIAN 10 DAY READER) Misc 1 Units by Misc.(Non-Drug; Combo Route) route 3 (three) times daily with meals. 1 each 0    flash glucose sensor (FREESTYLE BRIAN 10 DAY SENSOR) Kit  "1 Units by Misc.(Non-Drug; Combo Route) route 3 (three) times daily with meals. 1 kit 11    lancets [facility] To check BG 3 times daily, to use with insurance preferred meter 300 each 0    lisinopril (PRINIVIL,ZESTRIL) 40 MG tablet Take 1 tablet (40 mg total) by mouth once daily. 90 tablet 1    metFORMIN (GLUCOPHAGE) 1000 MG tablet Take 0.5 tablets (500 mg total) by mouth 2 (two) times daily with meals. 180 tablet 0    metoprolol succinate (TOPROL-XL) 25 MG 24 hr tablet Take 1 tablet (25 mg total) by mouth once daily. 90 tablet 1    pen needle, diabetic 32 gauge x 5/32" Ndle 1 Units by Misc.(Non-Drug; Combo Route) route every evening. 200 each 3     No current facility-administered medications for this visit.          ROS  Review of Systems   Constitutional: Negative for activity change, appetite change, fatigue, fever and unexpected weight change.   HENT: Negative for congestion and facial swelling.    Eyes: Negative for visual disturbance.   Respiratory: Negative for chest tightness, shortness of breath, wheezing and stridor.    Cardiovascular: Negative for chest pain, palpitations and leg swelling.   Gastrointestinal: Negative for abdominal distention, abdominal pain, constipation, diarrhea, nausea and vomiting.   Endocrine: Negative for cold intolerance, heat intolerance, polydipsia and polyuria.   Skin: Negative.    Allergic/Immunologic: Negative.    Neurological: Negative for dizziness, light-headedness, numbness and headaches.   Psychiatric/Behavioral: Negative for agitation and decreased concentration.       Physical Exam  Vitals:    01/21/20 1311   BP: 90/60   BP Location: Left arm   Patient Position: Sitting   BP Method: Large (Manual)   Pulse: 66   Temp: 98.2 °F (36.8 °C)   TempSrc: Oral   SpO2: 96%   Weight: 102 kg (224 lb 13.9 oz)   Height: 5' 9" (1.753 m)    Body mass index is 33.21 kg/m².  Weight: 102 kg (224 lb 13.9 oz)   Height: 5' 9" (175.3 cm)   Physical Exam   Constitutional: He is oriented to " person, place, and time. He appears well-developed and well-nourished.   HENT:   Head: Normocephalic and atraumatic.   Right Ear: External ear normal.   Left Ear: External ear normal.   Nose: Nose normal.   Mouth/Throat: Oropharynx is clear and moist.   Eyes: Pupils are equal, round, and reactive to light. Conjunctivae and EOM are normal.   Neck: Normal range of motion.   Cardiovascular: Normal rate, regular rhythm and normal heart sounds.   Pulmonary/Chest: Effort normal and breath sounds normal.   Neurological: He is alert and oriented to person, place, and time.   Skin: Skin is warm and dry.   Psychiatric: He has a normal mood and affect. His behavior is normal.   Vitals reviewed.        Health Maintenance       Date Due Completion Date    HIV Screening 05/18/1972 ---    Aspirin/Antiplatelet Therapy 05/18/1975 ---    Colonoscopy 05/18/2007 ---    Foot Exam 07/02/2019 7/2/2018    Shingles Vaccine (1 of 2) 09/25/2020 (Originally 5/18/2007) ---    Hemoglobin A1c 03/27/2020 12/27/2019    Lipid Panel 06/27/2020 6/27/2019    Eye Exam 09/27/2020 9/27/2019    High Dose Statin 01/21/2021 1/21/2020    TETANUS VACCINE 09/25/2029 9/25/2019              Patient note was created using [employer].  Any errors in syntax or even information may not have been identified and edited on initial review prior to signing this note.

## 2020-01-27 RX ORDER — METFORMIN HYDROCHLORIDE 1000 MG/1
500 TABLET ORAL 2 TIMES DAILY WITH MEALS
Qty: 180 TABLET | Refills: 0 | Status: SHIPPED | OUTPATIENT
Start: 2020-01-27 | End: 2020-01-28 | Stop reason: SDUPTHER

## 2020-01-27 NOTE — TELEPHONE ENCOUNTER
----- Message from Sil Luna sent at 1/27/2020 12:40 PM CST -----  Contact: pt  Type: RX Refill Request    Who Called: pt    Have you contacted your pharmacy:    Refill or New Rx:metFORMIN (GLUCOPHAGE) 1000 MG tablet     RX Name and Strength:    How is the patient currently taking it? (ex. 1XDay):    Is this a 30 day or 90 day RX:    Preferred Pharmacy with phone number:  [facility] [place] [facility] [id number] - [place] [address] - [address] [facility] [address]  [address]  [place] [id number]  Phone: [phone] Fax: [phone]    [facility] - [place] [address] [address]        Local or Mail Order:    Ordering Provider:    Would the patient rather a call back or a response via [facility]? call    Best Call Back Number:[phone]    Additional Information:

## 2020-01-28 DIAGNOSIS — I10 ESSENTIAL HYPERTENSION: ICD-10-CM

## 2020-01-28 RX ORDER — LISINOPRIL 40 MG/1
40 TABLET ORAL DAILY
Qty: 90 TABLET | Refills: 1 | Status: SHIPPED | OUTPATIENT
Start: 2020-01-28 | End: 2020-06-26 | Stop reason: SDUPTHER

## 2020-01-28 RX ORDER — AMLODIPINE BESYLATE 10 MG/1
10 TABLET ORAL DAILY
Qty: 90 TABLET | Refills: 1 | Status: SHIPPED | OUTPATIENT
Start: 2020-01-28 | End: 2020-06-26 | Stop reason: SDUPTHER

## 2020-01-28 RX ORDER — METOPROLOL SUCCINATE 25 MG/1
25 TABLET, EXTENDED RELEASE ORAL DAILY
Qty: 90 TABLET | Refills: 1 | Status: SHIPPED | OUTPATIENT
Start: 2020-01-28 | End: 2020-06-26 | Stop reason: SDUPTHER

## 2020-01-28 RX ORDER — METFORMIN HYDROCHLORIDE 1000 MG/1
500 TABLET ORAL 2 TIMES DAILY WITH MEALS
Qty: 180 TABLET | Refills: 0 | Status: SHIPPED | OUTPATIENT
Start: 2020-01-28 | End: 2020-01-28 | Stop reason: SDUPTHER

## 2020-01-28 RX ORDER — METFORMIN HYDROCHLORIDE 1000 MG/1
500 TABLET ORAL 2 TIMES DAILY WITH MEALS
Qty: 180 TABLET | Refills: 0 | Status: SHIPPED | OUTPATIENT
Start: 2020-01-28 | End: 2020-09-21

## 2020-01-28 NOTE — TELEPHONE ENCOUNTER
Last office visit 1/16/2020 Marie from [facility] stated she requested refills on all of his meds and it was returned back to her unknown. Please advise

## 2020-03-11 ENCOUNTER — PATIENT OUTREACH (OUTPATIENT)
Dept: ADMINISTRATIVE | Facility: HOSPITAL | Age: 65
End: 2020-03-11

## 2020-03-11 RX ORDER — GLUCOSAM/CHON-MSM1/C/MANG/BOSW 500-416.6
TABLET ORAL
COMMUNITY
Start: 2019-12-31 | End: 2020-03-25 | Stop reason: SDUPTHER

## 2020-03-11 RX ORDER — DIPHENHYDRAMINE HCL 25 MG
TABLET ORAL
COMMUNITY
Start: 2019-12-31

## 2020-03-11 NOTE — LETTER
AUTHORIZATION FOR RELEASE OF   CONFIDENTIAL INFORMATION    Dear [facility],    We are seeing Eyad Rodriguez, date of birth 1955, in the clinic at [facility] FAMILY [facility]/ INTERNAL MED/ PEDS. Nafisa Vega MD is the patient's PCP. Eyad Rodriguez has an outstanding lab/procedure at the time we reviewed his chart. In order to help keep his health information updated, he has authorized us to request the following medical record(s):        (  )  MAMMOGRAM                                      ( x )  COLONOSCOPY 2010-current      (  )  PAP SMEAR                                          (  )  OUTSIDE LAB RESULTS     (  )  DEXA SCAN                                          (  )  EYE EXAM            (  )  FOOT EXAM                                          (  )  ENTIRE RECORD     (  )  OUTSIDE IMMUNIZATIONS                 (  )  _______________         Please fax records to [facility], Nichole T Guillory, MD,  [phone].     If you have any questions, please contact Louise Mckeon LPN [facility] at   [phone].     Patient Name: Eyad Rodriguez  : 1955  Patient Phone #: [phone]

## 2020-03-11 NOTE — PROGRESS NOTES
No HIV test done.    No Colonoscopy at [facility]. Request sent to [facility].

## 2020-03-25 ENCOUNTER — OFFICE VISIT (OUTPATIENT)
Dept: FAMILY MEDICINE | Facility: CLINIC | Age: 65
End: 2020-03-25
Payer: MEDICARE

## 2020-03-25 VITALS
HEIGHT: 69 IN | WEIGHT: 232.81 LBS | TEMPERATURE: 98 F | DIASTOLIC BLOOD PRESSURE: 70 MMHG | SYSTOLIC BLOOD PRESSURE: 100 MMHG | HEART RATE: 51 BPM | BODY MASS INDEX: 34.48 KG/M2 | OXYGEN SATURATION: 96 %

## 2020-03-25 DIAGNOSIS — F51.02 TRANSIENT INSOMNIA: ICD-10-CM

## 2020-03-25 DIAGNOSIS — E11.9 CONTROLLED TYPE 2 DIABETES MELLITUS WITHOUT COMPLICATION, WITHOUT LONG-TERM CURRENT USE OF INSULIN: ICD-10-CM

## 2020-03-25 DIAGNOSIS — M54.50 CHRONIC MIDLINE LOW BACK PAIN: ICD-10-CM

## 2020-03-25 DIAGNOSIS — G89.29 CHRONIC MIDLINE LOW BACK PAIN: ICD-10-CM

## 2020-03-25 DIAGNOSIS — F41.9 ANXIETY: ICD-10-CM

## 2020-03-25 PROCEDURE — 3008F BODY MASS INDEX DOCD: CPT | Mod: HCNC,CPTII,S$GLB, | Performed by: FAMILY MEDICINE

## 2020-03-25 PROCEDURE — 3074F PR MOST RECENT SYSTOLIC BLOOD PRESSURE < 130 MM HG: ICD-10-PCS | Mod: HCNC,CPTII,S$GLB, | Performed by: FAMILY MEDICINE

## 2020-03-25 PROCEDURE — 3074F SYST BP LT 130 MM HG: CPT | Mod: HCNC,CPTII,S$GLB, | Performed by: FAMILY MEDICINE

## 2020-03-25 PROCEDURE — 3078F DIAST BP <80 MM HG: CPT | Mod: HCNC,CPTII,S$GLB, | Performed by: FAMILY MEDICINE

## 2020-03-25 PROCEDURE — 3008F PR BODY MASS INDEX (BMI) DOCUMENTED: ICD-10-PCS | Mod: HCNC,CPTII,S$GLB, | Performed by: FAMILY MEDICINE

## 2020-03-25 PROCEDURE — 3078F PR MOST RECENT DIASTOLIC BLOOD PRESSURE < 80 MM HG: ICD-10-PCS | Mod: HCNC,CPTII,S$GLB, | Performed by: FAMILY MEDICINE

## 2020-03-25 PROCEDURE — 2024F PR 7 FIELD PHOTOS WITH INTERP/ REVIEW: ICD-10-PCS | Mod: HCNC,S$GLB,, | Performed by: FAMILY MEDICINE

## 2020-03-25 PROCEDURE — 99214 PR OFFICE/OUTPT VISIT, EST, LEVL IV, 30-39 MIN: ICD-10-PCS | Mod: HCNC,S$GLB,, | Performed by: FAMILY MEDICINE

## 2020-03-25 PROCEDURE — 3046F HEMOGLOBIN A1C LEVEL >9.0%: CPT | Mod: HCNC,CPTII,S$GLB, | Performed by: FAMILY MEDICINE

## 2020-03-25 PROCEDURE — 99999 PR PBB SHADOW E&M-EST. PATIENT-LVL III: ICD-10-PCS | Mod: PBBFAC,HCNC,, | Performed by: FAMILY MEDICINE

## 2020-03-25 PROCEDURE — 99999 PR PBB SHADOW E&M-EST. PATIENT-LVL III: CPT | Mod: PBBFAC,HCNC,, | Performed by: FAMILY MEDICINE

## 2020-03-25 PROCEDURE — 3046F PR MOST RECENT HEMOGLOBIN A1C LEVEL > 9.0%: ICD-10-PCS | Mod: HCNC,CPTII,S$GLB, | Performed by: FAMILY MEDICINE

## 2020-03-25 PROCEDURE — 99214 OFFICE O/P EST MOD 30 MIN: CPT | Mod: HCNC,S$GLB,, | Performed by: FAMILY MEDICINE

## 2020-03-25 PROCEDURE — 2024F 7 FLD RTA PHOTO EVC RTNOPTHY: CPT | Mod: HCNC,S$GLB,, | Performed by: FAMILY MEDICINE

## 2020-03-25 RX ORDER — PEN NEEDLE, DIABETIC 30 GX3/16"
1 NEEDLE, DISPOSABLE MISCELLANEOUS NIGHTLY
Qty: 200 EACH | Refills: 3 | Status: SHIPPED | OUTPATIENT
Start: 2020-03-25 | End: 2023-08-29

## 2020-03-25 RX ORDER — HYDROCODONE BITARTRATE AND ACETAMINOPHEN 7.5; 325 MG/1; MG/1
1 TABLET ORAL EVERY 6 HOURS PRN
Qty: 30 TABLET | Refills: 0 | Status: SHIPPED | OUTPATIENT
Start: 2020-03-25 | End: 2020-07-07 | Stop reason: SDUPTHER

## 2020-03-25 RX ORDER — GLUCOSAM/CHON-MSM1/C/MANG/BOSW 500-416.6
TABLET ORAL
Qty: 300 EACH | Refills: 2 | Status: SHIPPED | OUTPATIENT
Start: 2020-03-25

## 2020-03-25 RX ORDER — CALCIUM CITRATE/VITAMIN D3 200MG-6.25
TABLET ORAL
Qty: 300 STRIP | Refills: 0 | Status: SHIPPED | OUTPATIENT
Start: 2020-03-25

## 2020-03-25 RX ORDER — LANCETS
EACH MISCELLANEOUS
Qty: 300 EACH | Refills: 3 | Status: SHIPPED | OUTPATIENT
Start: 2020-03-25 | End: 2023-08-29

## 2020-03-25 RX ORDER — ALPRAZOLAM 0.5 MG/1
0.5 TABLET ORAL 3 TIMES DAILY PRN
Qty: 90 TABLET | Refills: 2 | Status: SHIPPED | OUTPATIENT
Start: 2020-03-25 | End: 2020-06-26 | Stop reason: SDUPTHER

## 2020-03-25 RX ORDER — ZOLPIDEM TARTRATE 10 MG/1
10 TABLET ORAL NIGHTLY
Qty: 30 TABLET | Refills: 2 | Status: SHIPPED | OUTPATIENT
Start: 2020-03-25 | End: 2020-06-26 | Stop reason: SDUPTHER

## 2020-03-25 NOTE — PROGRESS NOTES
"  Assessment & Plan  Problem List Items Addressed This Visit        Psychiatric    Anxiety    Relevant Medications    ALPRAZolam (XANAX) 0.5 MG tablet       Endocrine    Controlled type 2 diabetes mellitus without complication, without long-term current use of insulin    Relevant Medications    insulin detemir U-100 (LEVEMIR FLEXTOUCH U-100 INSULN) 100 unit/mL (3 mL) SubQ InPn pen    pen needle, diabetic 32 gauge x 5/32" Ndle    lancets Misc      Other Visit Diagnoses     Transient insomnia        Relevant Medications    zolpidem (AMBIEN) 10 mg Tab    Chronic midline low back pain        Relevant Medications    HYDROcodone-acetaminophen (NORCO) 7.5-325 mg per tablet        Improvement in blood glucose control.  Will check hemoglobin A1c at next office visit.      The current medical regimen is effective;  continue present plan and medications.  Patient was counseled that a recent study showed that the chronic use of anxiolytic medication may increase the risk for developing dementia.  Health Maintenance reviewed.    Follow-up: Follow up in about 3 months (around 6/25/2020) for Diabetes Mellitus II.    ______________________________________________________________________    Chief Complaint  Chief Complaint   Patient presents with    Medication Refill    Diabetes       HPI  Eyad Rodriguez is a 64 y.o. male with multiple medical diagnoses as listed in the medical history and problem list that presents for diabetes and medication refill.  Pt is known to me with last appointment 1/21/2020.    Diabetes: The patient reports that they  check blood sugars at home on a regular basis.  Blood sugar results are generally in an acceptable range (> 70 and <150). The patient  denies any problems with low blood sugars. The patient  reports that they have been complaint with current treatment plan (diet, exercise, medication).     He continues to have back pain.  He will periodically us hydrocodone for his pain.  He does " require refills on his anxiety medication.          PAST MEDICAL HISTORY:  Past Medical History:   Diagnosis Date    Angina pectoris     Anxiety     BPH (benign prostatic hypertrophy)     Chronic low back pain     followed by pain management    Disorder of kidney and ureter     DJD (degenerative joint disease) of knee     Heart failure     Hemorrhoid     HTN (hypertension)     Insomnia     Obesity     Personal history of kidney stones     Tobacco abuse     Type 2 diabetes mellitus        PAST SURGICAL HISTORY:  Past Surgical History:   Procedure Laterality Date    cyst removed from back         SOCIAL HISTORY:  Social History     Socioeconomic History    Marital status:      Spouse name: Not on file    Number of children: 1    Years of education: Not on file    Highest education level: Not on file   Occupational History    Occupation:    Social Needs    Financial resource strain: Not on file    Food insecurity:     Worry: Not on file     Inability: Not on file    Transportation needs:     Medical: Not on file     Non-medical: Not on file   Tobacco Use    Smoking status: Former Smoker     Types: Cigars    Smokeless tobacco: Current User    Tobacco comment: quit 10 yrs ago   Substance and Sexual Activity    Alcohol use: No     Alcohol/week: 1.7 standard drinks     Types: 2 Standard drinks or equivalent per week    Drug use: No    Sexual activity: Not Currently   Lifestyle    Physical activity:     Days per week: Not on file     Minutes per session: Not on file    Stress: Not on file   Relationships    Social connections:     Talks on phone: Not on file     Gets together: Not on file     Attends Gnosticism service: Not on file     Active member of club or organization: Not on file     Attends meetings of clubs or organizations: Not on file     Relationship status: Not on file   Other Topics Concern    Not on file   Social History Narrative    Not on file       FAMILY  HISTORY:  Family History   Problem Relation Age of Onset    Hypertension Mother     Anxiety disorder Mother     Diverticulosis Mother     Irritable bowel syndrome Mother     Hypertension Father     Anxiety disorder Father     Anxiety disorder Sister     Colon polyps Brother     Colon cancer Neg Hx     Cirrhosis Neg Hx     Liver cancer Neg Hx     Celiac disease Neg Hx     Crohn's disease Neg Hx     Ulcerative colitis Neg Hx     Esophageal cancer Neg Hx     Stomach cancer Neg Hx     Rectal cancer Neg Hx        ALLERGIES AND MEDICATIONS: updated and reviewed.  Review of patient's allergies indicates:   Allergen Reactions    Codeine      Other reaction(s): Nausea  Other reaction(s): Nausea    Flexeril  [cyclobenzaprine]      Other reaction(s): Nausea     Current Outpatient Medications   Medication Sig Dispense Refill    amLODIPine (NORVASC) 10 MG tablet Take 1 tablet (10 mg total) by mouth once daily. 90 tablet 1    blood sugar diagnostic Strp To check BG 3 times daily, to use with insurance preferred meter 300 each 0    blood-glucose meter (FREESTYLE SYSTEM KIT) kit Use as instructed 1 each 0    flash glucose scanning reader (FREESTYLE BRIAN 10 DAY READER) Misc 1 Units by Misc.(Non-Drug; Combo Route) route 3 (three) times daily with meals. 1 each 0    flash glucose sensor (FREESTYLE BRIAN 10 DAY SENSOR) Kit 1 Units by Misc.(Non-Drug; Combo Route) route 3 (three) times daily with meals. 1 kit 11    HYDROcodone-acetaminophen (NORCO) 7.5-325 mg per tablet Take 1 tablet by mouth every 6 (six) hours as needed. 30 tablet 0    lancets Misc To check BG 3 times daily, to use with insurance preferred meter 300 each 3    lisinopril (PRINIVIL,ZESTRIL) 40 MG tablet Take 1 tablet (40 mg total) by mouth once daily. 90 tablet 1    metFORMIN (GLUCOPHAGE) 1000 MG tablet Take 0.5 tablets (500 mg total) by mouth 2 (two) times daily with meals. 180 tablet 0    metoprolol succinate (TOPROL-XL) 25 MG 24 hr tablet  "Take 1 tablet (25 mg total) by mouth once daily. 90 tablet 1    naproxen (NAPROSYN) 500 MG tablet Take 1 tablet (500 mg total) by mouth 2 (two) times daily with meals. 60 tablet 0    ondansetron (ZOFRAN) 4 MG tablet Take 1 tablet (4 mg total) by mouth every 6 (six) hours as needed for Nausea. 30 tablet 0    pen needle, diabetic 32 gauge x 5/32" Ndle 1 Units by Misc.(Non-Drug; Combo Route) route every evening. 200 each 3    rosuvastatin (CRESTOR) 5 MG tablet Take 1 tablet (5 mg total) by mouth once daily. 90 tablet 3    TRUE METRIX AIR GLUCOSE METER Misc use to test sugars THREE TIMES A DAY      TRUEPLUS LANCETS 30 gauge Misc use to test sugars THREE TIMES A  each 2    zolpidem (AMBIEN) 10 mg Tab Take 1 tablet (10 mg total) by mouth nightly. 30 tablet 2    ALPRAZolam (XANAX) 0.5 MG tablet Take 1 tablet (0.5 mg total) by mouth 3 (three) times daily as needed for Anxiety. 90 tablet 2    alprostadil (MUSE) 250 MCG pellet 1 each (250 mcg total) by Transurethral route as needed for Erectile Dysfunction. use no more than 3 times per week 6 each 1    insulin detemir U-100 (LEVEMIR FLEXTOUCH U-100 INSULN) 100 unit/mL (3 mL) SubQ InPn pen Inject 5 Units into the skin every evening. 1.5 mL 0     No current facility-administered medications for this visit.          ROS  Review of Systems   Constitutional: Positive for activity change, appetite change and unexpected weight change. Negative for fatigue and fever.   HENT: Negative for congestion and facial swelling.    Eyes: Negative for visual disturbance.   Respiratory: Negative for chest tightness, shortness of breath, wheezing and stridor.    Cardiovascular: Negative for chest pain, palpitations and leg swelling.   Gastrointestinal: Negative for abdominal distention, abdominal pain, constipation, diarrhea, nausea and vomiting.   Endocrine: Negative for cold intolerance, heat intolerance, polydipsia and polyuria.   Genitourinary: Negative for decreased urine " "volume, frequency, hematuria and urgency.   Musculoskeletal: Positive for back pain.   Skin: Negative.    Allergic/Immunologic: Negative.    Neurological: Negative for dizziness, light-headedness, numbness and headaches.        Blurry vision    Psychiatric/Behavioral: Negative for agitation and decreased concentration.           Physical Exam  Vitals:    03/25/20 0946   BP: 100/70   Pulse: (!) 51   Temp: 97.7 °F (36.5 °C)   TempSrc: Oral   SpO2: 96%   Weight: 105.6 kg (232 lb 12.9 oz)   Height: 5' 9" (1.753 m)    Body mass index is 34.38 kg/m².  Weight: 105.6 kg (232 lb 12.9 oz)   Height: 5' 9" (175.3 cm)   Physical Exam   Constitutional: He is oriented to person, place, and time. He appears well-developed and well-nourished.   HENT:   Head: Normocephalic and atraumatic.   Right Ear: External ear normal.   Left Ear: External ear normal.   Nose: Nose normal.   Mouth/Throat: Oropharynx is clear and moist.   Eyes: Pupils are equal, round, and reactive to light. Conjunctivae and EOM are normal.   Neck: Normal range of motion.   Cardiovascular: Normal rate, regular rhythm and normal heart sounds.   Pulmonary/Chest: Effort normal and breath sounds normal.   Neurological: He is alert and oriented to person, place, and time.   Skin: Skin is warm and dry.   Psychiatric: He has a normal mood and affect. His behavior is normal.   Vitals reviewed.      Health Maintenance       Date Due Completion Date    HIV Screening 05/18/1972 ---    Aspirin/Antiplatelet Therapy 05/18/1975 ---    Colonoscopy 05/18/2007 ---    Foot Exam 07/02/2019 7/2/2018    Hemoglobin A1c 03/27/2020 12/27/2019    Shingles Vaccine (1 of 2) 09/25/2020 (Originally 5/18/2007) ---    Lipid Panel 06/27/2020 6/27/2019    Eye Exam 09/27/2020 9/27/2019    High Dose Statin 01/30/2021 1/30/2020    TETANUS VACCINE 09/25/2029 9/25/2019              Patient note was created using [employer].  Any errors in syntax or even information may not have been identified and edited on " initial review prior to signing this note.

## 2020-04-24 DIAGNOSIS — M54.50 CHRONIC MIDLINE LOW BACK PAIN: ICD-10-CM

## 2020-04-24 DIAGNOSIS — G89.29 CHRONIC MIDLINE LOW BACK PAIN: ICD-10-CM

## 2020-04-24 RX ORDER — NAPROXEN 500 MG/1
TABLET ORAL
Qty: 60 TABLET | Refills: 0 | Status: SHIPPED | OUTPATIENT
Start: 2020-04-24 | End: 2021-04-06 | Stop reason: SDUPTHER

## 2020-05-07 DIAGNOSIS — E11.9 TYPE 2 DIABETES MELLITUS WITHOUT COMPLICATION: ICD-10-CM

## 2020-05-21 ENCOUNTER — OFFICE VISIT (OUTPATIENT)
Dept: FAMILY MEDICINE | Facility: CLINIC | Age: 65
End: 2020-05-21
Payer: MEDICARE

## 2020-05-21 VITALS
WEIGHT: 235 LBS | SYSTOLIC BLOOD PRESSURE: 138 MMHG | OXYGEN SATURATION: 95 % | BODY MASS INDEX: 34.8 KG/M2 | HEIGHT: 69 IN | TEMPERATURE: 99 F | DIASTOLIC BLOOD PRESSURE: 86 MMHG | HEART RATE: 91 BPM

## 2020-05-21 DIAGNOSIS — Z00.00 ENCOUNTER FOR PREVENTIVE HEALTH EXAMINATION: Primary | ICD-10-CM

## 2020-05-21 DIAGNOSIS — I42.9 CARDIOMYOPATHY, UNSPECIFIED TYPE: ICD-10-CM

## 2020-05-21 DIAGNOSIS — I70.0 ATHEROSCLEROSIS OF AORTA: ICD-10-CM

## 2020-05-21 DIAGNOSIS — F51.01 PRIMARY INSOMNIA: ICD-10-CM

## 2020-05-21 DIAGNOSIS — F41.9 ANXIETY: ICD-10-CM

## 2020-05-21 DIAGNOSIS — I50.9 CONGESTIVE HEART FAILURE, UNSPECIFIED HF CHRONICITY, UNSPECIFIED HEART FAILURE TYPE: ICD-10-CM

## 2020-05-21 DIAGNOSIS — E11.9 CONTROLLED TYPE 2 DIABETES MELLITUS WITHOUT COMPLICATION, WITHOUT LONG-TERM CURRENT USE OF INSULIN: ICD-10-CM

## 2020-05-21 DIAGNOSIS — I20.89 ANGINA AT REST: ICD-10-CM

## 2020-05-21 DIAGNOSIS — I10 ESSENTIAL HYPERTENSION: Chronic | ICD-10-CM

## 2020-05-21 DIAGNOSIS — Z11.4 SCREENING FOR HIV (HUMAN IMMUNODEFICIENCY VIRUS): ICD-10-CM

## 2020-05-21 PROCEDURE — 3079F PR MOST RECENT DIASTOLIC BLOOD PRESSURE 80-89 MM HG: ICD-10-PCS | Mod: HCNC,CPTII,S$GLB, | Performed by: NURSE PRACTITIONER

## 2020-05-21 PROCEDURE — 3075F PR MOST RECENT SYSTOLIC BLOOD PRESS GE 130-139MM HG: ICD-10-PCS | Mod: HCNC,CPTII,S$GLB, | Performed by: NURSE PRACTITIONER

## 2020-05-21 PROCEDURE — 3046F PR MOST RECENT HEMOGLOBIN A1C LEVEL > 9.0%: ICD-10-PCS | Mod: HCNC,CPTII,S$GLB, | Performed by: NURSE PRACTITIONER

## 2020-05-21 PROCEDURE — 99999 PR PBB SHADOW E&M-EST. PATIENT-LVL V: CPT | Mod: PBBFAC,HCNC,, | Performed by: NURSE PRACTITIONER

## 2020-05-21 PROCEDURE — G0439 PPPS, SUBSEQ VISIT: HCPCS | Mod: HCNC,S$GLB,, | Performed by: NURSE PRACTITIONER

## 2020-05-21 PROCEDURE — 3075F SYST BP GE 130 - 139MM HG: CPT | Mod: HCNC,CPTII,S$GLB, | Performed by: NURSE PRACTITIONER

## 2020-05-21 PROCEDURE — 3046F HEMOGLOBIN A1C LEVEL >9.0%: CPT | Mod: HCNC,CPTII,S$GLB, | Performed by: NURSE PRACTITIONER

## 2020-05-21 PROCEDURE — G0439 PR MEDICARE ANNUAL WELLNESS SUBSEQUENT VISIT: ICD-10-PCS | Mod: HCNC,S$GLB,, | Performed by: NURSE PRACTITIONER

## 2020-05-21 PROCEDURE — 3079F DIAST BP 80-89 MM HG: CPT | Mod: HCNC,CPTII,S$GLB, | Performed by: NURSE PRACTITIONER

## 2020-05-21 PROCEDURE — 99999 PR PBB SHADOW E&M-EST. PATIENT-LVL V: ICD-10-PCS | Mod: PBBFAC,HCNC,, | Performed by: NURSE PRACTITIONER

## 2020-05-21 PROCEDURE — 99499 UNLISTED E&M SERVICE: CPT | Mod: HCNC,S$GLB,, | Performed by: NURSE PRACTITIONER

## 2020-05-21 PROCEDURE — 99499 RISK ADDL DX/OHS AUDIT: ICD-10-PCS | Mod: HCNC,S$GLB,, | Performed by: NURSE PRACTITIONER

## 2020-05-21 NOTE — PROGRESS NOTES
"Eyad Rodriguez presented for a  Medicare AWV and comprehensive Health Risk Assessment today. The following components were reviewed and updated:    · Medical history  · Family History  · Social history  · Allergies and Current Medications  · Health Risk Assessment  · Health Maintenance  · Care Team     ** See Completed Assessments for Annual Wellness Visit within the encounter summary.**       The following assessments were completed:  · Living Situation  · CAGE  · Depression Screening  · Timed Get Up and Go  · Whisper Test  · Cognitive Function Screening  ·   ·   · Nutrition Screening  · ADL Screening  · PAQ Screening    Vitals:    05/21/20 1044   BP: 138/86   BP Location: Left arm   Patient Position: Sitting   BP Method: Medium (Manual)   Pulse: 91   Temp: 98.5 °F (36.9 °C)   SpO2: 95%   Weight: 106.6 kg (235 lb)   Height: 5' 9" (1.753 m)     Body mass index is 34.7 kg/m².  Physical Exam   Constitutional: He is oriented to person, place, and time.   Cardiovascular: Normal rate and regular rhythm.   Pulses:       Dorsalis pedis pulses are 2+ on the right side, and 2+ on the left side.        Posterior tibial pulses are 2+ on the right side, and 2+ on the left side.   Pulmonary/Chest: Effort normal and breath sounds normal.   Musculoskeletal: He exhibits no edema.        Right foot: There is normal range of motion and no deformity.        Left foot: There is normal range of motion and no deformity.   Feet:   Right Foot:   Protective Sensation: 9 sites tested. 9 sites sensed.   Skin Integrity: Negative for ulcer, blister, skin breakdown, erythema, warmth, callus or dry skin.   Left Foot:   Protective Sensation: 9 sites tested. 9 sites sensed.   Skin Integrity: Negative for ulcer, blister, skin breakdown, erythema, warmth, callus or dry skin.   Neurological: He is alert and oriented to person, place, and time.   Skin: Skin is warm, dry and intact.   Psychiatric: He has a normal mood and affect. His speech is normal and " behavior is normal.     Foot examination completed using medical monofilament sensory testing screening tool 5.07/10g filament       Diagnoses and health risks identified today and associated recommendations/orders:    1. Encounter for preventive health examination  Education provided about preventive health examinations and procedures; addressed and discussed patient's health concerns. Additionally, reviewed medical record for risk factors and documented the results during this encounter.    2. Atherosclerosis of aorta  The current medical regimen is effective;  continue present plan and medications.    3. Angina at rest  The current medical regimen is effective;  continue present plan and medications.    4. Congestive heart failure, unspecified HF chronicity, unspecified heart failure type  Stable; Continue as advised regarding dietary and lifestyle modifications.     5. Cardiomyopathy, unspecified type  Stable; Continue as advised regarding dietary and lifestyle modifications.     6. Controlled type 2 diabetes mellitus without complication, without long-term current use of insulin  Education provided about diabetes, management of blood glucose with diet and activities, monitoring for worsening effects of diabetes.  Reviewed most recent Ha1c and informed patient of complications associated with uncontrolled diabetes.     7. Essential hypertension  Presently at goal. Continue as advised regarding dietary and lifestyle modifications.     8. Primary insomnia  Stable, managed with zolpidem. Continue as advised regarding dietary and lifestyle modifications.     9. Anxiety  Stable, managed with alprazolam. Continue as advised regarding dietary and lifestyle modifications.     Provided Eyad with a 5-10 year written screening schedule and personal prevention plan. Recommendations were developed using the USPSTF age appropriate recommendations. Education, counseling, and referrals were provided as needed. After Visit  Summary printed and given to patient which includes a list of additional screenings\tests needed.    Follow up in about 1 year (around 5/21/2021) for health risk assessment .    Edil Waddell Jr, NP  I offered to discuss end of life issues, including information on how to make advance directives that the patient could use to name someone who would make medical decisions on their behalf if they became too ill to make themselves.    ___Patient declined  _X_Patient is interested, I provided paper work and offered to discuss.

## 2020-05-21 NOTE — PATIENT INSTRUCTIONS
Counseling and Referral of Other Preventative  (Italic type indicates deductible and co-insurance are waived)    Patient Name: Eyad Rodriguez  Today's Date: 5/21/2020    Health Maintenance       Date Due Completion Date    HIV Screening 05/18/1972 Patient aware of recommendation for HIV screening.     Aspirin/Antiplatelet Therapy 05/18/1975 Patient to discuss with PCP.     Colonoscopy 05/18/2007     Foot Exam 07/02/2019 7/2/2018, completed 05/21/2020    Hemoglobin A1c 03/27/2020 12/27/2019 Patient aware of recommendation for updated Ha1c.     Shingles Vaccine (1 of 2) 09/25/2020 (Originally 5/18/2007) ---    Lipid Panel 06/27/2020 6/27/2019    Eye Exam 09/27/2020 9/27/2019    High Dose Statin 03/25/2021 3/25/2020    TETANUS VACCINE 09/25/2029 9/25/2019        No orders of the defined types were placed in this encounter.    The following information is provided to all patients.  This information is to help you find resources for any of the problems found today that may be affecting your health:                Living healthy guide: www.[facility].[place].gov      Understanding Diabetes: www.diabetes.org      Eating healthy: www.cdc.gov/healthyweight      CDC home safety checklist: www.cdc.gov/steadi/patient.html      Agency on Aging: www.goea.[place].gov      Alcoholics anonymous (AA): www.aa.org      Physical Activity: www.aristeo.nih.gov/cl1iife      Tobacco use: www.quitwithusla.org

## 2020-06-26 ENCOUNTER — LAB VISIT (OUTPATIENT)
Dept: LAB | Facility: HOSPITAL | Age: 65
End: 2020-06-26
Attending: FAMILY MEDICINE
Payer: MEDICARE

## 2020-06-26 ENCOUNTER — OFFICE VISIT (OUTPATIENT)
Dept: FAMILY MEDICINE | Facility: CLINIC | Age: 65
End: 2020-06-26
Payer: MEDICARE

## 2020-06-26 VITALS
OXYGEN SATURATION: 95 % | TEMPERATURE: 98 F | WEIGHT: 233.69 LBS | HEIGHT: 69 IN | BODY MASS INDEX: 34.61 KG/M2 | SYSTOLIC BLOOD PRESSURE: 110 MMHG | HEART RATE: 62 BPM | DIASTOLIC BLOOD PRESSURE: 70 MMHG

## 2020-06-26 DIAGNOSIS — I10 ESSENTIAL HYPERTENSION: ICD-10-CM

## 2020-06-26 DIAGNOSIS — E11.9 CONTROLLED TYPE 2 DIABETES MELLITUS WITHOUT COMPLICATION, WITHOUT LONG-TERM CURRENT USE OF INSULIN: ICD-10-CM

## 2020-06-26 DIAGNOSIS — E11.9 TYPE 2 DIABETES MELLITUS WITHOUT COMPLICATION: ICD-10-CM

## 2020-06-26 DIAGNOSIS — F51.02 TRANSIENT INSOMNIA: ICD-10-CM

## 2020-06-26 DIAGNOSIS — G89.29 CHRONIC MIDLINE LOW BACK PAIN: ICD-10-CM

## 2020-06-26 DIAGNOSIS — M54.50 CHRONIC MIDLINE LOW BACK PAIN: ICD-10-CM

## 2020-06-26 DIAGNOSIS — F41.9 ANXIETY: ICD-10-CM

## 2020-06-26 LAB
ESTIMATED AVG GLUCOSE: 117 MG/DL (ref 68–131)
HBA1C MFR BLD HPLC: 5.7 % (ref 4–5.6)

## 2020-06-26 PROCEDURE — 3078F PR MOST RECENT DIASTOLIC BLOOD PRESSURE < 80 MM HG: ICD-10-PCS | Mod: HCNC,CPTII,S$GLB, | Performed by: FAMILY MEDICINE

## 2020-06-26 PROCEDURE — 83036 HEMOGLOBIN GLYCOSYLATED A1C: CPT | Mod: HCNC

## 2020-06-26 PROCEDURE — 99215 PR OFFICE/OUTPT VISIT, EST, LEVL V, 40-54 MIN: ICD-10-PCS | Mod: HCNC,S$GLB,, | Performed by: FAMILY MEDICINE

## 2020-06-26 PROCEDURE — 3074F SYST BP LT 130 MM HG: CPT | Mod: HCNC,CPTII,S$GLB, | Performed by: FAMILY MEDICINE

## 2020-06-26 PROCEDURE — 36415 COLL VENOUS BLD VENIPUNCTURE: CPT | Mod: HCNC,PO

## 2020-06-26 PROCEDURE — 99999 PR PBB SHADOW E&M-EST. PATIENT-LVL IV: ICD-10-PCS | Mod: PBBFAC,HCNC,, | Performed by: FAMILY MEDICINE

## 2020-06-26 PROCEDURE — 3008F BODY MASS INDEX DOCD: CPT | Mod: HCNC,CPTII,S$GLB, | Performed by: FAMILY MEDICINE

## 2020-06-26 PROCEDURE — 3078F DIAST BP <80 MM HG: CPT | Mod: HCNC,CPTII,S$GLB, | Performed by: FAMILY MEDICINE

## 2020-06-26 PROCEDURE — 3044F HG A1C LEVEL LT 7.0%: CPT | Mod: HCNC,CPTII,S$GLB, | Performed by: FAMILY MEDICINE

## 2020-06-26 PROCEDURE — 3074F PR MOST RECENT SYSTOLIC BLOOD PRESSURE < 130 MM HG: ICD-10-PCS | Mod: HCNC,CPTII,S$GLB, | Performed by: FAMILY MEDICINE

## 2020-06-26 PROCEDURE — 99999 PR PBB SHADOW E&M-EST. PATIENT-LVL IV: CPT | Mod: PBBFAC,HCNC,, | Performed by: FAMILY MEDICINE

## 2020-06-26 PROCEDURE — 3008F PR BODY MASS INDEX (BMI) DOCUMENTED: ICD-10-PCS | Mod: HCNC,CPTII,S$GLB, | Performed by: FAMILY MEDICINE

## 2020-06-26 PROCEDURE — 2024F PR 7 FIELD PHOTOS WITH INTERP/ REVIEW: ICD-10-PCS | Mod: HCNC,S$GLB,, | Performed by: FAMILY MEDICINE

## 2020-06-26 PROCEDURE — 99215 OFFICE O/P EST HI 40 MIN: CPT | Mod: HCNC,S$GLB,, | Performed by: FAMILY MEDICINE

## 2020-06-26 PROCEDURE — 2024F 7 FLD RTA PHOTO EVC RTNOPTHY: CPT | Mod: HCNC,S$GLB,, | Performed by: FAMILY MEDICINE

## 2020-06-26 PROCEDURE — 3044F PR MOST RECENT HEMOGLOBIN A1C LEVEL <7.0%: ICD-10-PCS | Mod: HCNC,CPTII,S$GLB, | Performed by: FAMILY MEDICINE

## 2020-06-26 RX ORDER — INSULIN DETEMIR 100 [IU]/ML
5 INJECTION, SOLUTION SUBCUTANEOUS NIGHTLY
Qty: 1.5 ML | Refills: 0 | Status: SHIPPED | OUTPATIENT
Start: 2020-06-26 | End: 2020-10-07 | Stop reason: SDUPTHER

## 2020-06-26 RX ORDER — LISINOPRIL 40 MG/1
40 TABLET ORAL DAILY
Qty: 90 TABLET | Refills: 1 | Status: SHIPPED | OUTPATIENT
Start: 2020-06-26 | End: 2021-02-09

## 2020-06-26 RX ORDER — ALPRAZOLAM 0.5 MG/1
0.5 TABLET ORAL 3 TIMES DAILY PRN
Qty: 90 TABLET | Refills: 2 | Status: SHIPPED | OUTPATIENT
Start: 2020-06-26 | End: 2020-09-10

## 2020-06-26 RX ORDER — HYDROCODONE BITARTRATE AND ACETAMINOPHEN 7.5; 325 MG/1; MG/1
1 TABLET ORAL EVERY 6 HOURS PRN
Qty: 30 TABLET | Refills: 0 | Status: CANCELLED | OUTPATIENT
Start: 2020-06-26

## 2020-06-26 RX ORDER — AMLODIPINE BESYLATE 10 MG/1
10 TABLET ORAL DAILY
Qty: 90 TABLET | Refills: 1 | Status: SHIPPED | OUTPATIENT
Start: 2020-06-26 | End: 2020-10-17 | Stop reason: SDUPTHER

## 2020-06-26 RX ORDER — ZOLPIDEM TARTRATE 10 MG/1
10 TABLET ORAL NIGHTLY
Qty: 30 TABLET | Refills: 2 | Status: SHIPPED | OUTPATIENT
Start: 2020-06-26 | End: 2020-09-10

## 2020-06-26 RX ORDER — METOPROLOL SUCCINATE 25 MG/1
25 TABLET, EXTENDED RELEASE ORAL DAILY
Qty: 90 TABLET | Refills: 1 | Status: SHIPPED | OUTPATIENT
Start: 2020-06-26 | End: 2021-02-09

## 2020-06-26 NOTE — PROGRESS NOTES
Assessment & Plan  Problem List Items Addressed This Visit        Psychiatric    Anxiety    Relevant Medications    ALPRAZolam (XANAX) 0.5 MG tablet       Cardiac/Vascular    Essential hypertension (Chronic)    Relevant Medications    lisinopriL (PRINIVIL,ZESTRIL) 40 MG tablet    metoprolol succinate (TOPROL-XL) 25 MG 24 hr tablet       Endocrine    Controlled type 2 diabetes mellitus without complication, without long-term current use of insulin    Relevant Medications    insulin detemir U-100 (LEVEMIR FLEXTOUCH U-100 INSULN) 100 unit/mL (3 mL) InPn pen      Other Visit Diagnoses     Chronic midline low back pain        Transient insomnia        Relevant Medications    zolpidem (AMBIEN) 10 mg Tab            Health Maintenance reviewed.    Follow-up: Follow up in about 6 weeks (around 8/7/2020) for back pain .    ______________________________________________________________________    Chief Complaint  Chief Complaint   Patient presents with    Diabetes    Medication Refill    Back Pain       HPI  Eyad Rodriguez is a 65 y.o. male with multiple medical diagnoses as listed in the medical history and problem list that presents for diabetes.  Pt is known to me with last appointment 3/25/2020.    Diabetes: The patient reports that they  check blood sugars at home on a regular basis.  Blood sugar results are generally in an acceptable range (> 70 and <150). The patient  denies any problems with low blood sugars. The patient  reports that they have been complaint with current treatment plan (diet, exercise, medication).    He reports left side pain that is deep in the mid back.  He has used naproxen to help with the pain.  Pain can linger.  He reports infrequent pain, but it can be quite severe pain that can impede his ambulation.     PAST MEDICAL HISTORY:  Past Medical History:   Diagnosis Date    Angina pectoris     Anxiety     BPH (benign prostatic hypertrophy)     Chronic low back pain     followed by pain  management    Disorder of kidney and ureter     DJD (degenerative joint disease) of knee     Heart failure     Hemorrhoid     HTN (hypertension)     Insomnia     Obesity     Personal history of kidney stones     Tobacco abuse     Type 2 diabetes mellitus        PAST SURGICAL HISTORY:  Past Surgical History:   Procedure Laterality Date    cyst removed from back         SOCIAL HISTORY:  Social History     Socioeconomic History    Marital status:      Spouse name: Not on file    Number of children: 1    Years of education: Not on file    Highest education level: Not on file   Occupational History    Occupation:    Social Needs    Financial resource strain: Not on file    Food insecurity     Worry: Not on file     Inability: Not on file    Transportation needs     Medical: Not on file     Non-medical: Not on file   Tobacco Use    Smoking status: Former Smoker     Types: Cigars    Smokeless tobacco: Current User    Tobacco comment: quit 10 yrs ago   Substance and Sexual Activity    Alcohol use: No     Alcohol/week: 1.7 standard drinks     Types: 2 Standard drinks or equivalent per week    Drug use: No    Sexual activity: Not Currently   Lifestyle    Physical activity     Days per week: Not on file     Minutes per session: Not on file    Stress: Not on file   Relationships    Social connections     Talks on phone: Not on file     Gets together: Not on file     Attends Shinto service: Not on file     Active member of club or organization: Not on file     Attends meetings of clubs or organizations: Not on file     Relationship status: Not on file   Other Topics Concern    Not on file   Social History Narrative    Not on file       FAMILY HISTORY:  Family History   Problem Relation Age of Onset    Hypertension Mother     Anxiety disorder Mother     Diverticulosis Mother     Irritable bowel syndrome Mother     Hypertension Father     Anxiety disorder Father     Anxiety  disorder Sister     Colon polyps Brother     Colon cancer Neg Hx     Cirrhosis Neg Hx     Liver cancer Neg Hx     Celiac disease Neg Hx     Crohn's disease Neg Hx     Ulcerative colitis Neg Hx     Esophageal cancer Neg Hx     Stomach cancer Neg Hx     Rectal cancer Neg Hx        ALLERGIES AND MEDICATIONS: updated and reviewed.  Review of patient's allergies indicates:   Allergen Reactions    Codeine      Other reaction(s): Nausea  Other reaction(s): Nausea    Flexeril  [cyclobenzaprine]      Other reaction(s): Nausea     Current Outpatient Medications   Medication Sig Dispense Refill    ALPRAZolam (XANAX) 0.5 MG tablet Take 1 tablet (0.5 mg total) by mouth 3 (three) times daily as needed for Anxiety. 90 tablet 2    amLODIPine (NORVASC) 10 MG tablet Take 1 tablet (10 mg total) by mouth once daily. 90 tablet 1    blood-glucose meter (FREESTYLE SYSTEM KIT) kit Use as instructed 1 each 0    flash glucose scanning reader (FREESTYLE BRIAN 10 DAY READER) Misc 1 Units by Misc.(Non-Drug; Combo Route) route 3 (three) times daily with meals. 1 each 0    flash glucose sensor (FREESTYLE BRIAN 10 DAY SENSOR) Kit 1 Units by Misc.(Non-Drug; Combo Route) route 3 (three) times daily with meals. 1 kit 11    HYDROcodone-acetaminophen (NORCO) 7.5-325 mg per tablet Take 1 tablet by mouth every 6 (six) hours as needed. 30 tablet 0    insulin detemir U-100 (LEVEMIR FLEXTOUCH U-100 INSULN) 100 unit/mL (3 mL) InPn pen Inject 5 Units into the skin every evening. 1.5 mL 0    lancets Misc To check BG 3 times daily, to use with insurance preferred meter 300 each 3    lisinopriL (PRINIVIL,ZESTRIL) 40 MG tablet Take 1 tablet (40 mg total) by mouth once daily. 90 tablet 1    metFORMIN (GLUCOPHAGE) 1000 MG tablet Take 0.5 tablets (500 mg total) by mouth 2 (two) times daily with meals. 180 tablet 0    metoprolol succinate (TOPROL-XL) 25 MG 24 hr tablet Take 1 tablet (25 mg total) by mouth once daily. 90 tablet 1    naproxen  "(NAPROSYN) 500 MG tablet TAKE 1 TABLET BY MOUTH TWICE A DAY with meals 60 tablet 0    pen needle, diabetic 32 gauge x 5/32" Ndle 1 Units by Misc.(Non-Drug; Combo Route) route every evening. 200 each 3    rosuvastatin (CRESTOR) 5 MG tablet Take 1 tablet (5 mg total) by mouth once daily. 90 tablet 3    TRUE METRIX AIR GLUCOSE METER Misc use to test sugars THREE TIMES A DAY      TRUE METRIX GLUCOSE TEST STRIP Strp use to test blood sugars THREE TIMES A  strip 0    TRUEPLUS LANCETS 30 gauge Misc use to test sugars THREE TIMES A  each 2    zolpidem (AMBIEN) 10 mg Tab Take 1 tablet (10 mg total) by mouth nightly. 30 tablet 2    alprostadil (MUSE) 250 MCG pellet 1 each (250 mcg total) by Transurethral route as needed for Erectile Dysfunction. use no more than 3 times per week 6 each 1     No current facility-administered medications for this visit.          ROS  Review of Systems   Constitutional: Negative for activity change, appetite change, fatigue, fever and unexpected weight change.   HENT: Negative for congestion and facial swelling.    Eyes: Negative for visual disturbance.   Respiratory: Negative for chest tightness, shortness of breath, wheezing and stridor.    Cardiovascular: Negative for chest pain, palpitations and leg swelling.   Gastrointestinal: Negative for abdominal distention, abdominal pain, constipation, diarrhea, nausea and vomiting.   Endocrine: Negative for cold intolerance, heat intolerance, polydipsia and polyuria.   Musculoskeletal: Positive for arthralgias, back pain and myalgias.   Skin: Negative.    Allergic/Immunologic: Negative.    Neurological: Negative for dizziness, light-headedness, numbness and headaches.   Psychiatric/Behavioral: Negative for agitation and decreased concentration.           Physical Exam  Vitals:    06/26/20 0952   BP: 110/70   BP Location: Left arm   Patient Position: Sitting   BP Method: Large (Manual)   Pulse: 62   Temp: 97.7 °F (36.5 °C) " "  TempSrc: Temporal   SpO2: 95%   Weight: 106 kg (233 lb 11 oz)   Height: 5' 9" (1.753 m)    Body mass index is 34.51 kg/m².  Weight: 106 kg (233 lb 11 oz)   Height: 5' 9" (175.3 cm)   Physical Exam  Vitals signs reviewed.   Constitutional:       Appearance: He is well-developed.   HENT:      Head: Normocephalic and atraumatic.      Right Ear: External ear normal.      Left Ear: External ear normal.      Nose: Nose normal.   Eyes:      Conjunctiva/sclera: Conjunctivae normal.      Pupils: Pupils are equal, round, and reactive to light.   Neck:      Musculoskeletal: Normal range of motion.   Cardiovascular:      Rate and Rhythm: Normal rate and regular rhythm.      Heart sounds: Normal heart sounds.   Pulmonary:      Effort: Pulmonary effort is normal.      Breath sounds: Normal breath sounds.   Skin:     General: Skin is warm and dry.   Neurological:      Mental Status: He is alert and oriented to person, place, and time.   Psychiatric:         Behavior: Behavior normal.         Health Maintenance       Date Due Completion Date    HIV Screening 05/18/1972 ---    Aspirin/Antiplatelet Therapy 05/18/1975 ---    Colorectal Cancer Screening 07/12/2014 7/11/2014    Hemoglobin A1c 03/27/2020 12/27/2019    Shingles Vaccine (1 of 2) 09/25/2020 (Originally 5/18/2007) ---    Lipid Panel 06/27/2020 6/27/2019    Eye Exam 09/27/2020 9/27/2019    Foot Exam 05/21/2021 5/21/2020    Override on 5/21/2020: Done    High Dose Statin 06/26/2021 6/26/2020    TETANUS VACCINE 09/25/2029 9/25/2019              Patient note was created using [employer].  Any errors in syntax or even information may not have been identified and edited on initial review prior to signing this note.    "

## 2020-07-03 DIAGNOSIS — E11.9 TYPE 2 DIABETES MELLITUS WITHOUT COMPLICATION: ICD-10-CM

## 2020-07-07 ENCOUNTER — OFFICE VISIT (OUTPATIENT)
Dept: FAMILY MEDICINE | Facility: CLINIC | Age: 65
End: 2020-07-07
Payer: MEDICARE

## 2020-07-07 VITALS
OXYGEN SATURATION: 96 % | HEIGHT: 69 IN | HEART RATE: 60 BPM | RESPIRATION RATE: 16 BRPM | TEMPERATURE: 98 F | WEIGHT: 231.5 LBS | SYSTOLIC BLOOD PRESSURE: 124 MMHG | DIASTOLIC BLOOD PRESSURE: 60 MMHG | BODY MASS INDEX: 34.29 KG/M2

## 2020-07-07 DIAGNOSIS — G89.29 CHRONIC MIDLINE LOW BACK PAIN: ICD-10-CM

## 2020-07-07 DIAGNOSIS — M54.50 CHRONIC MIDLINE LOW BACK PAIN: ICD-10-CM

## 2020-07-07 PROCEDURE — 3074F SYST BP LT 130 MM HG: CPT | Mod: HCNC,CPTII,S$GLB, | Performed by: FAMILY MEDICINE

## 2020-07-07 PROCEDURE — 3008F BODY MASS INDEX DOCD: CPT | Mod: HCNC,CPTII,S$GLB, | Performed by: FAMILY MEDICINE

## 2020-07-07 PROCEDURE — 3078F DIAST BP <80 MM HG: CPT | Mod: HCNC,CPTII,S$GLB, | Performed by: FAMILY MEDICINE

## 2020-07-07 PROCEDURE — 99214 PR OFFICE/OUTPT VISIT, EST, LEVL IV, 30-39 MIN: ICD-10-PCS | Mod: HCNC,S$GLB,, | Performed by: FAMILY MEDICINE

## 2020-07-07 PROCEDURE — 99214 OFFICE O/P EST MOD 30 MIN: CPT | Mod: HCNC,S$GLB,, | Performed by: FAMILY MEDICINE

## 2020-07-07 PROCEDURE — 3078F PR MOST RECENT DIASTOLIC BLOOD PRESSURE < 80 MM HG: ICD-10-PCS | Mod: HCNC,CPTII,S$GLB, | Performed by: FAMILY MEDICINE

## 2020-07-07 PROCEDURE — 99999 PR PBB SHADOW E&M-EST. PATIENT-LVL IV: CPT | Mod: PBBFAC,HCNC,, | Performed by: FAMILY MEDICINE

## 2020-07-07 PROCEDURE — 3008F PR BODY MASS INDEX (BMI) DOCUMENTED: ICD-10-PCS | Mod: HCNC,CPTII,S$GLB, | Performed by: FAMILY MEDICINE

## 2020-07-07 PROCEDURE — 3074F PR MOST RECENT SYSTOLIC BLOOD PRESSURE < 130 MM HG: ICD-10-PCS | Mod: HCNC,CPTII,S$GLB, | Performed by: FAMILY MEDICINE

## 2020-07-07 PROCEDURE — 99999 PR PBB SHADOW E&M-EST. PATIENT-LVL IV: ICD-10-PCS | Mod: PBBFAC,HCNC,, | Performed by: FAMILY MEDICINE

## 2020-07-07 RX ORDER — HYDROCODONE BITARTRATE AND ACETAMINOPHEN 7.5; 325 MG/1; MG/1
1 TABLET ORAL EVERY 6 HOURS PRN
Qty: 30 TABLET | Refills: 0 | Status: SHIPPED | OUTPATIENT
Start: 2020-07-07 | End: 2020-10-07 | Stop reason: SDUPTHER

## 2020-07-07 NOTE — PROGRESS NOTES
Assessment & Plan  Problem List Items Addressed This Visit     None      Visit Diagnoses     Chronic midline low back pain        Relevant Medications    HYDROcodone-acetaminophen (NORCO) 7.5-325 mg per tablet         I have discussed the common side effects of this medication with the patient and answered all of the questions they had at the time of this visit regarding this medication.      Health Maintenance reviewed.    Follow-up: Follow up in about 3 months (around 10/7/2020).    ______________________________________________________________________    Chief Complaint  Chief Complaint   Patient presents with    Follow-up       HPI  Eyad Rodriguez is a 65 y.o. male with multiple medical diagnoses as listed in the medical history and problem list that presents for follow up on back pain  Pt is known to me with last appointment 6/26/2020.    He has a history of increased back pain.  He denies any recent injuries.   Patient was counseled that a recent study showed that the chronic use of anxiolytic medication may increase the risk for developing dementia.  He is aware of the risk of opiates and benzodiazipenes.  He reports increased bilateral shoulder pain.  He has noticed increased sciatic pain on the left side.  He has tried hot showers to get his joints moving.  He is unable to fish or trawling.  He would like to increase his exercise.        PAST MEDICAL HISTORY:  Past Medical History:   Diagnosis Date    Angina pectoris     Anxiety     BPH (benign prostatic hypertrophy)     Chronic low back pain     followed by pain management    Disorder of kidney and ureter     DJD (degenerative joint disease) of knee     Heart failure     Hemorrhoid     HTN (hypertension)     Insomnia     Obesity     Personal history of kidney stones     Tobacco abuse     Type 2 diabetes mellitus        PAST SURGICAL HISTORY:  Past Surgical History:   Procedure Laterality Date    cyst removed from back         SOCIAL  HISTORY:  Social History     Socioeconomic History    Marital status:      Spouse name: Not on file    Number of children: 1    Years of education: Not on file    Highest education level: Not on file   Occupational History    Occupation:    Social Needs    Financial resource strain: Not on file    Food insecurity     Worry: Not on file     Inability: Not on file    Transportation needs     Medical: Not on file     Non-medical: Not on file   Tobacco Use    Smoking status: Former Smoker     Types: Cigars    Smokeless tobacco: Current User    Tobacco comment: quit 10 yrs ago   Substance and Sexual Activity    Alcohol use: No     Alcohol/week: 1.7 standard drinks     Types: 2 Standard drinks or equivalent per week    Drug use: No    Sexual activity: Not Currently   Lifestyle    Physical activity     Days per week: Not on file     Minutes per session: Not on file    Stress: Not on file   Relationships    Social connections     Talks on phone: Not on file     Gets together: Not on file     Attends Baptism service: Not on file     Active member of club or organization: Not on file     Attends meetings of clubs or organizations: Not on file     Relationship status: Not on file   Other Topics Concern    Not on file   Social History Narrative    Not on file       FAMILY HISTORY:  Family History   Problem Relation Age of Onset    Hypertension Mother     Anxiety disorder Mother     Diverticulosis Mother     Irritable bowel syndrome Mother     Hypertension Father     Anxiety disorder Father     Anxiety disorder Sister     Colon polyps Brother     Colon cancer Neg Hx     Cirrhosis Neg Hx     Liver cancer Neg Hx     Celiac disease Neg Hx     Crohn's disease Neg Hx     Ulcerative colitis Neg Hx     Esophageal cancer Neg Hx     Stomach cancer Neg Hx     Rectal cancer Neg Hx        ALLERGIES AND MEDICATIONS: updated and reviewed.  Review of patient's allergies indicates:  "  Allergen Reactions    Codeine      Other reaction(s): Nausea  Other reaction(s): Nausea    Flexeril  [cyclobenzaprine]      Other reaction(s): Nausea     Current Outpatient Medications   Medication Sig Dispense Refill    ALPRAZolam (XANAX) 0.5 MG tablet Take 1 tablet (0.5 mg total) by mouth 3 (three) times daily as needed for Anxiety. 90 tablet 2    amLODIPine (NORVASC) 10 MG tablet Take 1 tablet (10 mg total) by mouth once daily. 90 tablet 1    blood-glucose meter (FREESTYLE SYSTEM KIT) kit Use as instructed 1 each 0    flash glucose scanning reader (FREESTYLE BRIAN 10 DAY READER) Misc 1 Units by Misc.(Non-Drug; Combo Route) route 3 (three) times daily with meals. 1 each 0    flash glucose sensor (FREESTYLE BRIAN 10 DAY SENSOR) Kit 1 Units by Misc.(Non-Drug; Combo Route) route 3 (three) times daily with meals. 1 kit 11    HYDROcodone-acetaminophen (NORCO) 7.5-325 mg per tablet Take 1 tablet by mouth every 6 (six) hours as needed. 30 tablet 0    insulin detemir U-100 (LEVEMIR FLEXTOUCH U-100 INSULN) 100 unit/mL (3 mL) InPn pen Inject 5 Units into the skin every evening. 1.5 mL 0    lancets Mis To check BG 3 times daily, to use with insurance preferred meter 300 each 3    lisinopriL (PRINIVIL,ZESTRIL) 40 MG tablet Take 1 tablet (40 mg total) by mouth once daily. 90 tablet 1    metFORMIN (GLUCOPHAGE) 1000 MG tablet Take 0.5 tablets (500 mg total) by mouth 2 (two) times daily with meals. 180 tablet 0    metoprolol succinate (TOPROL-XL) 25 MG 24 hr tablet Take 1 tablet (25 mg total) by mouth once daily. 90 tablet 1    naproxen (NAPROSYN) 500 MG tablet TAKE 1 TABLET BY MOUTH TWICE A DAY with meals 60 tablet 0    pen needle, diabetic 32 gauge x 5/32" Ndle 1 Units by Misc.(Non-Drug; Combo Route) route every evening. 200 each 3    rosuvastatin (CRESTOR) 5 MG tablet Take 1 tablet (5 mg total) by mouth once daily. 90 tablet 3    TRUE METRIX AIR GLUCOSE METER Misc use to test sugars THREE TIMES A DAY      " "TRUE METRIX GLUCOSE TEST STRIP Strp use to test blood sugars THREE TIMES A  strip 0    TRUEPLUS LANCETS 30 gauge Misc use to test sugars THREE TIMES A  each 2    zolpidem (AMBIEN) 10 mg Tab Take 1 tablet (10 mg total) by mouth nightly. 30 tablet 2    alprostadil (MUSE) 250 MCG pellet 1 each (250 mcg total) by Transurethral route as needed for Erectile Dysfunction. use no more than 3 times per week 6 each 1     No current facility-administered medications for this visit.          ROS  Review of Systems   Constitutional: Negative for activity change, appetite change, fatigue, fever and unexpected weight change.   HENT: Negative for congestion and facial swelling.    Eyes: Negative for visual disturbance.   Respiratory: Negative for chest tightness, shortness of breath, wheezing and stridor.    Cardiovascular: Negative for chest pain, palpitations and leg swelling.   Gastrointestinal: Negative for abdominal distention, abdominal pain, constipation, diarrhea, nausea and vomiting.   Endocrine: Negative for cold intolerance, heat intolerance, polydipsia and polyuria.   Musculoskeletal: Positive for back pain and myalgias.   Skin: Negative.    Allergic/Immunologic: Negative.    Neurological: Negative for dizziness, light-headedness, numbness and headaches.   Psychiatric/Behavioral: Negative for agitation and decreased concentration.           Physical Exam  Vitals:    07/07/20 0813   BP: 124/60   BP Location: Right arm   Patient Position: Sitting   BP Method: Large (Manual)   Pulse: 60   Resp: 16   Temp: 97.9 °F (36.6 °C)   TempSrc: Temporal   SpO2: 96%   Weight: 105 kg (231 lb 7.7 oz)   Height: 5' 9" (1.753 m)    Body mass index is 34.18 kg/m².  Weight: 105 kg (231 lb 7.7 oz)   Height: 5' 9" (175.3 cm)   Physical Exam  Vitals signs reviewed.   Constitutional:       Appearance: He is well-developed.   HENT:      Head: Normocephalic and atraumatic.      Right Ear: External ear normal.      Left Ear: " External ear normal.      Nose: Nose normal.      Mouth/Throat:      Pharynx: No oropharyngeal exudate.   Eyes:      Conjunctiva/sclera: Conjunctivae normal.      Pupils: Pupils are equal, round, and reactive to light.   Neck:      Musculoskeletal: Normal range of motion and neck supple.   Cardiovascular:      Rate and Rhythm: Normal rate and regular rhythm.      Heart sounds: Normal heart sounds. No murmur. No friction rub. No gallop.    Pulmonary:      Effort: Pulmonary effort is normal.      Breath sounds: Normal breath sounds.   Abdominal:      General: Bowel sounds are normal.      Palpations: Abdomen is soft.   Musculoskeletal: Normal range of motion.   Skin:     General: Skin is warm and dry.   Neurological:      Mental Status: He is alert and oriented to person, place, and time.      Deep Tendon Reflexes: Reflexes are normal and symmetric.   Psychiatric:         Behavior: Behavior normal.         Thought Content: Thought content normal.           Health Maintenance       Date Due Completion Date    HIV Screening 05/18/1972 ---    Aspirin/Antiplatelet Therapy 05/18/1975 ---    Colorectal Cancer Screening 07/12/2014 7/11/2014    Lipid Panel 06/27/2020 6/27/2019    Shingles Vaccine (1 of 2) 09/25/2020 (Originally 5/18/2007) ---    Influenza Vaccine (1) 09/01/2020 9/25/2019    Hemoglobin A1c 09/26/2020 6/26/2020    Eye Exam 09/27/2020 9/27/2019    Foot Exam 05/21/2021 5/21/2020    Override on 5/21/2020: Done    High Dose Statin 06/26/2021 6/26/2020    TETANUS VACCINE 09/25/2029 9/25/2019              Patient note was created using [employer].  Any errors in syntax or even information may not have been identified and edited on initial review prior to signing this note.

## 2020-09-10 DIAGNOSIS — I10 ESSENTIAL HYPERTENSION: ICD-10-CM

## 2020-09-10 DIAGNOSIS — E11.9 CONTROLLED TYPE 2 DIABETES MELLITUS WITHOUT COMPLICATION, WITHOUT LONG-TERM CURRENT USE OF INSULIN: ICD-10-CM

## 2020-09-10 RX ORDER — ROSUVASTATIN CALCIUM 5 MG/1
5 TABLET, COATED ORAL DAILY
Qty: 90 TABLET | Refills: 3 | Status: SHIPPED | OUTPATIENT
Start: 2020-09-10 | End: 2023-03-22 | Stop reason: CLARIF

## 2020-09-10 NOTE — TELEPHONE ENCOUNTER
----- Message from Elodia Oden sent at 9/10/2020  9:22 AM CDT -----  Type: Patient Call Back    Who called: Self     What is the request in detail: patient states he was told he should still have refills at the pharmacy but he does not. Please call     ALPRAZolam (XANAX) 0.5 MG tablet  zolpidem (AMBIEN) 10 mg Tab  rosuvastatin (CRESTOR) 5 MG tablet      [facility] [place] [id number] [place] [phone] (Phone)  [phone] (Fax)          Can the clinic reply by [facility]? No     Would the patient rather a call back or a response via [facility]?  Call     Best call back number:[phone] (home)

## 2020-10-07 ENCOUNTER — OFFICE VISIT (OUTPATIENT)
Dept: FAMILY MEDICINE | Facility: CLINIC | Age: 65
End: 2020-10-07
Payer: MEDICARE

## 2020-10-07 VITALS
OXYGEN SATURATION: 95 % | SYSTOLIC BLOOD PRESSURE: 122 MMHG | HEART RATE: 63 BPM | WEIGHT: 231.25 LBS | BODY MASS INDEX: 34.25 KG/M2 | TEMPERATURE: 97 F | HEIGHT: 69 IN | DIASTOLIC BLOOD PRESSURE: 64 MMHG

## 2020-10-07 DIAGNOSIS — M54.50 CHRONIC MIDLINE LOW BACK PAIN: Primary | ICD-10-CM

## 2020-10-07 DIAGNOSIS — Z23 NEED FOR PROPHYLACTIC VACCINATION AND INOCULATION AGAINST INFLUENZA: ICD-10-CM

## 2020-10-07 DIAGNOSIS — E11.9 CONTROLLED TYPE 2 DIABETES MELLITUS WITHOUT COMPLICATION, WITHOUT LONG-TERM CURRENT USE OF INSULIN: ICD-10-CM

## 2020-10-07 DIAGNOSIS — G89.29 CHRONIC MIDLINE LOW BACK PAIN: Primary | ICD-10-CM

## 2020-10-07 PROCEDURE — G0008 FLU VACCINE (QUAD) GREATER THAN OR EQUAL TO 3YO PRESERVATIVE FREE IM: ICD-10-PCS | Mod: HCNC,S$GLB,, | Performed by: FAMILY MEDICINE

## 2020-10-07 PROCEDURE — 99999 PR PBB SHADOW E&M-EST. PATIENT-LVL IV: ICD-10-PCS | Mod: PBBFAC,HCNC,, | Performed by: FAMILY MEDICINE

## 2020-10-07 PROCEDURE — 90686 IIV4 VACC NO PRSV 0.5 ML IM: CPT | Mod: HCNC,S$GLB,, | Performed by: FAMILY MEDICINE

## 2020-10-07 PROCEDURE — 99215 PR OFFICE/OUTPT VISIT, EST, LEVL V, 40-54 MIN: ICD-10-PCS | Mod: HCNC,25,S$GLB, | Performed by: FAMILY MEDICINE

## 2020-10-07 PROCEDURE — 90686 FLU VACCINE (QUAD) GREATER THAN OR EQUAL TO 3YO PRESERVATIVE FREE IM: ICD-10-PCS | Mod: HCNC,S$GLB,, | Performed by: FAMILY MEDICINE

## 2020-10-07 PROCEDURE — G0008 ADMIN INFLUENZA VIRUS VAC: HCPCS | Mod: HCNC,S$GLB,, | Performed by: FAMILY MEDICINE

## 2020-10-07 PROCEDURE — 3008F BODY MASS INDEX DOCD: CPT | Mod: HCNC,CPTII,S$GLB, | Performed by: FAMILY MEDICINE

## 2020-10-07 PROCEDURE — 3078F DIAST BP <80 MM HG: CPT | Mod: HCNC,CPTII,S$GLB, | Performed by: FAMILY MEDICINE

## 2020-10-07 PROCEDURE — 3008F PR BODY MASS INDEX (BMI) DOCUMENTED: ICD-10-PCS | Mod: HCNC,CPTII,S$GLB, | Performed by: FAMILY MEDICINE

## 2020-10-07 PROCEDURE — 99999 PR PBB SHADOW E&M-EST. PATIENT-LVL IV: CPT | Mod: PBBFAC,HCNC,, | Performed by: FAMILY MEDICINE

## 2020-10-07 PROCEDURE — 3078F PR MOST RECENT DIASTOLIC BLOOD PRESSURE < 80 MM HG: ICD-10-PCS | Mod: HCNC,CPTII,S$GLB, | Performed by: FAMILY MEDICINE

## 2020-10-07 PROCEDURE — 3044F HG A1C LEVEL LT 7.0%: CPT | Mod: HCNC,CPTII,S$GLB, | Performed by: FAMILY MEDICINE

## 2020-10-07 PROCEDURE — 99215 OFFICE O/P EST HI 40 MIN: CPT | Mod: HCNC,25,S$GLB, | Performed by: FAMILY MEDICINE

## 2020-10-07 PROCEDURE — 3074F SYST BP LT 130 MM HG: CPT | Mod: HCNC,CPTII,S$GLB, | Performed by: FAMILY MEDICINE

## 2020-10-07 PROCEDURE — 3074F PR MOST RECENT SYSTOLIC BLOOD PRESSURE < 130 MM HG: ICD-10-PCS | Mod: HCNC,CPTII,S$GLB, | Performed by: FAMILY MEDICINE

## 2020-10-07 PROCEDURE — 3044F PR MOST RECENT HEMOGLOBIN A1C LEVEL <7.0%: ICD-10-PCS | Mod: HCNC,CPTII,S$GLB, | Performed by: FAMILY MEDICINE

## 2020-10-07 RX ORDER — HYDROCODONE BITARTRATE AND ACETAMINOPHEN 7.5; 325 MG/1; MG/1
1 TABLET ORAL EVERY 6 HOURS PRN
Qty: 30 TABLET | Refills: 0 | Status: SHIPPED | OUTPATIENT
Start: 2020-10-07 | End: 2021-04-06 | Stop reason: SDUPTHER

## 2020-10-07 RX ORDER — INSULIN DETEMIR 100 [IU]/ML
5 INJECTION, SOLUTION SUBCUTANEOUS NIGHTLY
Qty: 1.5 ML | Refills: 0 | Status: SHIPPED | OUTPATIENT
Start: 2020-10-07 | End: 2022-02-28

## 2020-10-07 NOTE — PROGRESS NOTES
Assessment & Plan  Problem List Items Addressed This Visit        Endocrine    Controlled type 2 diabetes mellitus without complication, without long-term current use of insulin    Relevant Medications    insulin detemir U-100 (LEVEMIR FLEXTOUCH U-100 INSULN) 100 unit/mL (3 mL) InPn pen      Other Visit Diagnoses     Chronic midline low back pain    -  Primary    Relevant Medications    HYDROcodone-acetaminophen (NORCO) 7.5-325 mg per tablet    Need for prophylactic vaccination and inoculation against influenza        Relevant Orders    Influenza - Quadrivalent (PF) (Completed)      Patient is stable.  Assess and addressed all modifiable risk factors.  Continue with appropriate management to prevent complications.  Continue with current therapy.   evaluated.       Health Maintenance reviewed.    Follow-up: No follow-ups on file.    ______________________________________________________________________    Chief Complaint  Chief Complaint   Patient presents with    Follow-up    Medication Refill       HPI  Eyad Rodriguez is a 65 y.o. male with multiple medical diagnoses as listed in the medical history and problem list that presents for medication refill.  Pt is known to me with last appointment 7/7/2020.    Patient denies any new symptoms including chest pain, SOB, blurry vision, N/V, diarrhea.  He has a history of increased back pain.  He denies any recent injuries.   Patient was counseled that a recent study showed that the chronic use of anxiolytic medication may increase the risk for developing dementia.  He is aware of the risk of opiates and benzodiazipenes.  He reports increased bilateral shoulder pain.  He has noticed increased sciatic pain on the left side.  He has tried hot showers to get his joints moving.  He has tried to increase his exercise.  He is still very active.      Diabetes: The patient reports that they  check blood sugars only occasionally. Blood sugar results are labile.  > 200 The  patient  denies any problems with low blood sugars. The patient  reports that they have been complaint with current treatment plan (diet, exercise, medication).      PAST MEDICAL HISTORY:  Past Medical History:   Diagnosis Date    Angina pectoris     Anxiety     BPH (benign prostatic hypertrophy)     Chronic low back pain     followed by pain management    Disorder of kidney and ureter     DJD (degenerative joint disease) of knee     Heart failure     Hemorrhoid     HTN (hypertension)     Insomnia     Obesity     Personal history of kidney stones     Tobacco abuse     Type 2 diabetes mellitus        PAST SURGICAL HISTORY:  Past Surgical History:   Procedure Laterality Date    cyst removed from back         SOCIAL HISTORY:  Social History     Socioeconomic History    Marital status:      Spouse name: Not on file    Number of children: 1    Years of education: Not on file    Highest education level: Not on file   Occupational History    Occupation:    Social Needs    Financial resource strain: Not on file    Food insecurity     Worry: Not on file     Inability: Not on file    Transportation needs     Medical: Not on file     Non-medical: Not on file   Tobacco Use    Smoking status: Former Smoker     Types: Cigars    Smokeless tobacco: Current User    Tobacco comment: quit 10 yrs ago   Substance and Sexual Activity    Alcohol use: No     Alcohol/week: 1.7 standard drinks     Types: 2 Standard drinks or equivalent per week    Drug use: No    Sexual activity: Not Currently   Lifestyle    Physical activity     Days per week: Not on file     Minutes per session: Not on file    Stress: Not on file   Relationships    Social connections     Talks on phone: Not on file     Gets together: Not on file     Attends [employer] service: Not on file     Active member of club or organization: Not on file     Attends meetings of clubs or organizations: Not on file     Relationship  status: Not on file   Other Topics Concern    Not on file   Social History Narrative    Not on file       FAMILY HISTORY:  Family History   Problem Relation Age of Onset    Hypertension Mother     Anxiety disorder Mother     Diverticulosis Mother     Irritable bowel syndrome Mother     Hypertension Father     Anxiety disorder Father     Anxiety disorder Sister     Colon polyps Brother     Colon cancer Neg Hx     Cirrhosis Neg Hx     Liver cancer Neg Hx     Celiac disease Neg Hx     Crohn's disease Neg Hx     Ulcerative colitis Neg Hx     Esophageal cancer Neg Hx     Stomach cancer Neg Hx     Rectal cancer Neg Hx        ALLERGIES AND MEDICATIONS: updated and reviewed.  Review of patient's allergies indicates:   Allergen Reactions    Codeine      Other reaction(s): Nausea  Other reaction(s): Nausea    Cyclobenzaprine Hives and Other (See Comments)     Other reaction(s): Nausea     Current Outpatient Medications   Medication Sig Dispense Refill    ALPRAZolam (XANAX) 0.5 MG tablet TAKE 1 TABLET BY MOUTH THREE TIMES A DAY AS NEEDED FOR anxiety 90 tablet 2    alprostadil (MUSE) 250 MCG pellet 1 each (250 mcg total) by Transurethral route as needed for Erectile Dysfunction. use no more than 3 times per week 6 each 1    amLODIPine (NORVASC) 10 MG tablet Take 1 tablet (10 mg total) by mouth once daily. 90 tablet 1    blood-glucose meter (FREESTYLE SYSTEM KIT) kit Use as instructed 1 each 0    flash glucose scanning reader (FREESTYLE BRIAN 10 DAY READER) Misc 1 Units by Misc.(Non-Drug; Combo Route) route 3 (three) times daily with meals. 1 each 0    flash glucose sensor (FREESTYLE BRIAN 10 DAY SENSOR) Kit 1 Units by Misc.(Non-Drug; Combo Route) route 3 (three) times daily with meals. 1 kit 11    HYDROcodone-acetaminophen (NORCO) 7.5-325 mg per tablet Take 1 tablet by mouth every 6 (six) hours as needed. 30 tablet 0    insulin detemir U-100 (LEVEMIR FLEXTOUCH U-100 INSULN) 100 unit/mL (3 mL) InPn  "pen Inject 5 Units into the skin every evening. 1.5 mL 0    lancets Misc To check BG 3 times daily, to use with insurance preferred meter 300 each 3    lisinopriL (PRINIVIL,ZESTRIL) 40 MG tablet Take 1 tablet (40 mg total) by mouth once daily. 90 tablet 1    metFORMIN (GLUCOPHAGE) 1000 MG tablet TAKE 1/2 (one-half) TABLET BY MOUTH TWICE A DAY with meals 180 tablet 0    metoprolol succinate (TOPROL-XL) 25 MG 24 hr tablet Take 1 tablet (25 mg total) by mouth once daily. 90 tablet 1    naproxen (NAPROSYN) 500 MG tablet TAKE 1 TABLET BY MOUTH TWICE A DAY with meals 60 tablet 0    pen needle, diabetic 32 gauge x 5/32" Ndle 1 Units by Misc.(Non-Drug; Combo Route) route every evening. 200 each 3    rosuvastatin (CRESTOR) 5 MG tablet Take 1 tablet (5 mg total) by mouth once daily. 90 tablet 3    TRUE METRIX AIR GLUCOSE METER Misc use to test sugars THREE TIMES A DAY      TRUE METRIX GLUCOSE TEST STRIP Strp use to test blood sugars THREE TIMES A  strip 0    TRUEPLUS LANCETS 30 gauge Misc use to test sugars THREE TIMES A  each 2    zolpidem (AMBIEN) 10 mg Tab TAKE 1 TABLET BY MOUTH EVERY NIGHT AT BEDTIME 30 tablet 2     No current facility-administered medications for this visit.          ROS  Review of Systems   Constitutional: Negative for activity change, appetite change, fatigue, fever and unexpected weight change.   HENT: Negative for congestion and facial swelling.    Eyes: Negative for visual disturbance.   Respiratory: Negative for chest tightness, shortness of breath, wheezing and stridor.    Cardiovascular: Negative for chest pain, palpitations and leg swelling.   Gastrointestinal: Negative for abdominal distention, abdominal pain, constipation, diarrhea, nausea and vomiting.   Endocrine: Negative for cold intolerance, heat intolerance, polydipsia and polyuria.   Skin: Negative.    Allergic/Immunologic: Negative.    Neurological: Negative for dizziness, light-headedness, numbness and " "headaches.   Psychiatric/Behavioral: Negative for agitation and decreased concentration. The patient is nervous/anxious.            Physical Exam  Vitals:    10/07/20 0944   BP: 122/64   BP Location: Left arm   Patient Position: Sitting   BP Method: Large (Manual)   Pulse: 63   Temp: 97.2 °F (36.2 °C)   TempSrc: Oral   SpO2: 95%   Weight: 104.9 kg (231 lb 4.2 oz)   Height: 5' 9" (1.753 m)    Body mass index is 34.15 kg/m².  Weight: 104.9 kg (231 lb 4.2 oz)   Height: 5' 9" (175.3 cm)   Physical Exam  Vitals signs reviewed.   Constitutional:       Appearance: He is well-developed.   HENT:      Head: Normocephalic and atraumatic.      Right Ear: External ear normal.      Left Ear: External ear normal.      Nose: Nose normal.   Eyes:      Conjunctiva/sclera: Conjunctivae normal.      Pupils: Pupils are equal, round, and reactive to light.   Neck:      Musculoskeletal: Normal range of motion.   Cardiovascular:      Rate and Rhythm: Normal rate and regular rhythm.      Heart sounds: Normal heart sounds.   Pulmonary:      Effort: Pulmonary effort is normal.      Breath sounds: Normal breath sounds.   Skin:     General: Skin is warm and dry.   Neurological:      Mental Status: He is alert and oriented to person, place, and time.   Psychiatric:         Behavior: Behavior normal.           Health Maintenance       Date Due Completion Date    HIV Screening 05/18/1972 ---    Aspirin/Antiplatelet Therapy 05/18/1975 ---    Shingles Vaccine (1 of 2) 05/18/2007 ---    Colorectal Cancer Screening 07/11/2015 7/11/2014    Lipid Panel 06/27/2020 6/27/2019    Influenza Vaccine (1) 08/01/2020 9/25/2019    Hemoglobin A1c 09/26/2020 6/26/2020    Eye Exam 09/27/2020 9/27/2019    Foot Exam 05/21/2021 5/21/2020    Override on 5/21/2020: Done    High Dose Statin 09/10/2021 9/10/2020    TETANUS VACCINE 09/25/2029 9/25/2019              Patient note was created using [employer].  Any errors in syntax or even information may not have been identified " and edited on initial review prior to signing this note.

## 2020-10-09 DIAGNOSIS — E11.9 TYPE 2 DIABETES MELLITUS WITHOUT COMPLICATION: ICD-10-CM

## 2020-10-21 ENCOUNTER — PATIENT OUTREACH (OUTPATIENT)
Dept: ADMINISTRATIVE | Facility: HOSPITAL | Age: 65
End: 2020-10-21

## 2020-11-09 ENCOUNTER — TELEPHONE (OUTPATIENT)
Dept: FAMILY MEDICINE | Facility: CLINIC | Age: 65
End: 2020-11-09

## 2020-11-09 NOTE — TELEPHONE ENCOUNTER
----- Message from Daly Navarrete sent at 11/9/2020  9:58 AM CST -----  Regarding: self [phone]  .Type:  Sooner Appointment Request    Patient is requesting a sooner appointment.  Patient declined first available appointment listed as well as another facility and provider .  Patient will not accept being placed on the waitlist and is requesting a message be sent to doctor.    Name of Caller:  self     When is the first available appointment? 12/29    Symptoms:  Requesting to speak tonja Lehman in regards to running out of medication on 12-07  Would the patient rather a call back or a response via [facility] back     Best Call Back Number: [phone]

## 2020-11-13 DIAGNOSIS — E11.9 TYPE 2 DIABETES MELLITUS WITHOUT COMPLICATION, UNSPECIFIED WHETHER LONG TERM INSULIN USE: ICD-10-CM

## 2020-12-01 ENCOUNTER — OFFICE VISIT (OUTPATIENT)
Dept: FAMILY MEDICINE | Facility: CLINIC | Age: 65
End: 2020-12-01
Payer: MEDICARE

## 2020-12-01 VITALS
WEIGHT: 235 LBS | DIASTOLIC BLOOD PRESSURE: 60 MMHG | HEIGHT: 69 IN | TEMPERATURE: 98 F | HEART RATE: 84 BPM | SYSTOLIC BLOOD PRESSURE: 132 MMHG | BODY MASS INDEX: 34.8 KG/M2 | OXYGEN SATURATION: 99 %

## 2020-12-01 DIAGNOSIS — F41.9 ANXIETY: ICD-10-CM

## 2020-12-01 DIAGNOSIS — F51.02 TRANSIENT INSOMNIA: ICD-10-CM

## 2020-12-01 DIAGNOSIS — S46.911D STRAIN OF RIGHT SHOULDER, SUBSEQUENT ENCOUNTER: Primary | ICD-10-CM

## 2020-12-01 PROCEDURE — 1125F PR PAIN SEVERITY QUANTIFIED, PAIN PRESENT: ICD-10-PCS | Mod: HCNC,S$GLB,, | Performed by: FAMILY MEDICINE

## 2020-12-01 PROCEDURE — 3075F PR MOST RECENT SYSTOLIC BLOOD PRESS GE 130-139MM HG: ICD-10-PCS | Mod: HCNC,CPTII,S$GLB, | Performed by: FAMILY MEDICINE

## 2020-12-01 PROCEDURE — 99214 OFFICE O/P EST MOD 30 MIN: CPT | Mod: HCNC,S$GLB,, | Performed by: FAMILY MEDICINE

## 2020-12-01 PROCEDURE — 99999 PR PBB SHADOW E&M-EST. PATIENT-LVL III: CPT | Mod: PBBFAC,HCNC,, | Performed by: FAMILY MEDICINE

## 2020-12-01 PROCEDURE — 99999 PR PBB SHADOW E&M-EST. PATIENT-LVL III: ICD-10-PCS | Mod: PBBFAC,HCNC,, | Performed by: FAMILY MEDICINE

## 2020-12-01 PROCEDURE — 3078F DIAST BP <80 MM HG: CPT | Mod: HCNC,CPTII,S$GLB, | Performed by: FAMILY MEDICINE

## 2020-12-01 PROCEDURE — 3008F PR BODY MASS INDEX (BMI) DOCUMENTED: ICD-10-PCS | Mod: HCNC,CPTII,S$GLB, | Performed by: FAMILY MEDICINE

## 2020-12-01 PROCEDURE — 3008F BODY MASS INDEX DOCD: CPT | Mod: HCNC,CPTII,S$GLB, | Performed by: FAMILY MEDICINE

## 2020-12-01 PROCEDURE — 3075F SYST BP GE 130 - 139MM HG: CPT | Mod: HCNC,CPTII,S$GLB, | Performed by: FAMILY MEDICINE

## 2020-12-01 PROCEDURE — 1125F AMNT PAIN NOTED PAIN PRSNT: CPT | Mod: HCNC,S$GLB,, | Performed by: FAMILY MEDICINE

## 2020-12-01 PROCEDURE — 3078F PR MOST RECENT DIASTOLIC BLOOD PRESSURE < 80 MM HG: ICD-10-PCS | Mod: HCNC,CPTII,S$GLB, | Performed by: FAMILY MEDICINE

## 2020-12-01 PROCEDURE — 99214 PR OFFICE/OUTPT VISIT, EST, LEVL IV, 30-39 MIN: ICD-10-PCS | Mod: HCNC,S$GLB,, | Performed by: FAMILY MEDICINE

## 2020-12-01 RX ORDER — TIZANIDINE 2 MG/1
2 TABLET ORAL EVERY 8 HOURS PRN
Qty: 30 TABLET | Refills: 0 | Status: SHIPPED | OUTPATIENT
Start: 2020-12-01 | End: 2020-12-11

## 2020-12-01 RX ORDER — ZOLPIDEM TARTRATE 10 MG/1
10 TABLET ORAL NIGHTLY
Qty: 30 TABLET | Refills: 2 | Status: SHIPPED | OUTPATIENT
Start: 2020-12-01 | End: 2021-03-02 | Stop reason: SDUPTHER

## 2020-12-01 RX ORDER — ALPRAZOLAM 0.5 MG/1
TABLET ORAL
Qty: 90 TABLET | Refills: 2 | Status: SHIPPED | OUTPATIENT
Start: 2020-12-01 | End: 2021-03-02 | Stop reason: SDUPTHER

## 2020-12-01 NOTE — PROGRESS NOTES
Assessment & Plan  Problem List Items Addressed This Visit        Psychiatric    Anxiety    Relevant Medications    ALPRAZolam (XANAX) 0.5 MG tablet      Other Visit Diagnoses     Strain of right shoulder, subsequent encounter    -  Primary    Relevant Medications    tiZANidine (ZANAFLEX) 2 MG tablet    Transient insomnia        Relevant Medications    zolpidem (AMBIEN) 10 mg Tab            Health Maintenance reviewed.    Follow-up: Follow up in about 3 months (around 3/1/2021).    ______________________________________________________________________    Chief Complaint  Chief Complaint   Patient presents with    Follow-up       HPI  Eyad Rodriguez is a 65 y.o. male with multiple medical diagnoses as listed in the medical history and problem list that presents for medication refills.  Pt is known to me with last appointment 10/7/2020.    Diabetes: The patient reports that they  check blood sugars at home on a regular basis.  Blood sugar results are generally in an acceptable range (> 70 and <150). The patient  denies any problems with low blood sugars. The patient  reports that they have been complaint with current treatment plan (diet, exercise, medication).      Deep breath can cause increased shoulder pain on the right side.  No left sided pain. Raising his right arm can illicit the pain.       PAST MEDICAL HISTORY:  Past Medical History:   Diagnosis Date    Angina pectoris     Anxiety     BPH (benign prostatic hypertrophy)     Chronic low back pain     followed by pain management    Disorder of kidney and ureter     DJD (degenerative joint disease) of knee     Heart failure     Hemorrhoid     HTN (hypertension)     Insomnia     Obesity     Personal history of kidney stones     Tobacco abuse     Type 2 diabetes mellitus        PAST SURGICAL HISTORY:  Past Surgical History:   Procedure Laterality Date    cyst removed from back         SOCIAL HISTORY:  Social History     Socioeconomic History     Marital status:      Spouse name: Not on file    Number of children: 1    Years of education: Not on file    Highest education level: Not on file   Occupational History    Occupation:    Social Needs    Financial resource strain: Not on file    Food insecurity     Worry: Not on file     Inability: Not on file    Transportation needs     Medical: Not on file     Non-medical: Not on file   Tobacco Use    Smoking status: Former Smoker     Types: Cigars    Smokeless tobacco: Current User    Tobacco comment: quit 10 yrs ago   Substance and Sexual Activity    Alcohol use: No     Alcohol/week: 1.7 standard drinks     Types: 2 Standard drinks or equivalent per week    Drug use: No    Sexual activity: Not Currently   Lifestyle    Physical activity     Days per week: Not on file     Minutes per session: Not on file    Stress: Not on file   Relationships    Social connections     Talks on phone: Not on file     Gets together: Not on file     Attends [employer] service: Not on file     Active member of club or organization: Not on file     Attends meetings of clubs or organizations: Not on file     Relationship status: Not on file   Other Topics Concern    Not on file   Social History Narrative    Not on file       FAMILY HISTORY:  Family History   Problem Relation Age of Onset    Hypertension Mother     Anxiety disorder Mother     Diverticulosis Mother     Irritable bowel syndrome Mother     Hypertension Father     Anxiety disorder Father     Anxiety disorder Sister     Colon polyps Brother     Colon cancer Neg Hx     Cirrhosis Neg Hx     Liver cancer Neg Hx     Celiac disease Neg Hx     Crohn's disease Neg Hx     Ulcerative colitis Neg Hx     Esophageal cancer Neg Hx     Stomach cancer Neg Hx     Rectal cancer Neg Hx        ALLERGIES AND MEDICATIONS: updated and reviewed.  Review of patient's allergies indicates:   Allergen Reactions    Codeine      Other reaction(s):  "Nausea  Other reaction(s): Nausea    Cyclobenzaprine Hives and Other (See Comments)     Other reaction(s): Nausea     Current Outpatient Medications   Medication Sig Dispense Refill    ALPRAZolam (XANAX) 0.5 MG tablet TAKE 1 TABLET BY MOUTH THREE TIMES A DAY AS NEEDED FOR anxiety 90 tablet 2    amLODIPine (NORVASC) 10 MG tablet TAKE 1 TABLET BY MOUTH ONCE A DAY 90 tablet 1    blood-glucose meter (FREESTYLE SYSTEM KIT) kit Use as instructed 1 each 0    flash glucose scanning reader (FREESTYLE BRIAN 10 DAY READER) Misc 1 Units by Misc.(Non-Drug; Combo Route) route 3 (three) times daily with meals. 1 each 0    flash glucose sensor (FREESTYLE BRIAN 10 DAY SENSOR) Kit 1 Units by Misc.(Non-Drug; Combo Route) route 3 (three) times daily with meals. 1 kit 11    HYDROcodone-acetaminophen (NORCO) 7.5-325 mg per tablet Take 1 tablet by mouth every 6 (six) hours as needed. 30 tablet 0    lancets Misc To check BG 3 times daily, to use with insurance preferred meter 300 each 3    lisinopriL (PRINIVIL,ZESTRIL) 40 MG tablet Take 1 tablet (40 mg total) by mouth once daily. 90 tablet 1    metFORMIN (GLUCOPHAGE) 1000 MG tablet TAKE 1/2 (one-half) TABLET BY MOUTH TWICE A DAY with meals 180 tablet 0    metoprolol succinate (TOPROL-XL) 25 MG 24 hr tablet Take 1 tablet (25 mg total) by mouth once daily. 90 tablet 1    naproxen (NAPROSYN) 500 MG tablet TAKE 1 TABLET BY MOUTH TWICE A DAY with meals 60 tablet 0    pen needle, diabetic 32 gauge x 5/32" Ndle 1 Units by Misc.(Non-Drug; Combo Route) route every evening. 200 each 3    rosuvastatin (CRESTOR) 5 MG tablet Take 1 tablet (5 mg total) by mouth once daily. 90 tablet 3    TRUE METRIX AIR GLUCOSE METER Misc use to test sugars THREE TIMES A DAY      TRUE METRIX GLUCOSE TEST STRIP Strp use to test blood sugars THREE TIMES A  strip 0    TRUEPLUS LANCETS 30 gauge Misc use to test sugars THREE TIMES A  each 2    zolpidem (AMBIEN) 10 mg Tab Take 1 tablet (10 mg " "total) by mouth nightly. 30 tablet 2    alprostadil (MUSE) 250 MCG pellet 1 each (250 mcg total) by Transurethral route as needed for Erectile Dysfunction. use no more than 3 times per week 6 each 1    insulin detemir U-100 (LEVEMIR FLEXTOUCH U-100 INSULN) 100 unit/mL (3 mL) InPn pen Inject 5 Units into the skin every evening. 1.5 mL 0    tiZANidine (ZANAFLEX) 2 MG tablet Take 1 tablet (2 mg total) by mouth every 8 (eight) hours as needed. 30 tablet 0     No current facility-administered medications for this visit.          ROS  Review of Systems   Constitutional: Negative for activity change, appetite change, fatigue, fever and unexpected weight change.   HENT: Negative for congestion and facial swelling.    Eyes: Negative for visual disturbance.   Respiratory: Negative for chest tightness, shortness of breath, wheezing and stridor.    Cardiovascular: Negative for chest pain, palpitations and leg swelling.   Gastrointestinal: Negative for abdominal distention, abdominal pain, constipation, diarrhea, nausea and vomiting.   Endocrine: Negative for cold intolerance, heat intolerance, polydipsia and polyuria.   Skin: Negative.    Allergic/Immunologic: Negative.    Neurological: Negative for dizziness, light-headedness, numbness and headaches.   Psychiatric/Behavioral: Negative for agitation and decreased concentration. The patient is nervous/anxious.            Physical Exam  Vitals:    12/01/20 1458   BP: 132/60   BP Location: Left arm   Patient Position: Sitting   BP Method: Large (Automatic)   Pulse: 84   Temp: 98.2 °F (36.8 °C)   TempSrc: Oral   SpO2: 99%   Weight: 106.6 kg (235 lb)   Height: 5' 9" (1.753 m)    Body mass index is 34.7 kg/m².  Weight: 106.6 kg (235 lb)   Height: 5' 9" (175.3 cm)   Physical Exam  Vitals signs reviewed.   Constitutional:       Appearance: He is well-developed.   HENT:      Head: Normocephalic and atraumatic.      Right Ear: External ear normal.      Left Ear: External ear normal. "      Nose: Nose normal.      Mouth/Throat:      Pharynx: No oropharyngeal exudate.   Eyes:      Conjunctiva/sclera: Conjunctivae normal.      Pupils: Pupils are equal, round, and reactive to light.   Neck:      Musculoskeletal: Normal range of motion and neck supple.   Cardiovascular:      Rate and Rhythm: Normal rate and regular rhythm.      Heart sounds: Normal heart sounds. No murmur. No friction rub. No gallop.    Pulmonary:      Effort: Pulmonary effort is normal.      Breath sounds: Normal breath sounds.   Abdominal:      General: Bowel sounds are normal.      Palpations: Abdomen is soft.   Musculoskeletal: Normal range of motion.        Back:    Skin:     General: Skin is warm and dry.   Neurological:      Mental Status: He is alert and oriented to person, place, and time.      Deep Tendon Reflexes: Reflexes are normal and symmetric.   Psychiatric:         Behavior: Behavior normal.         Thought Content: Thought content normal.           Health Maintenance       Date Due Completion Date    HIV Screening [birth year]1972 ---    Shingles Vaccine (1 of 2) 05/18/2007 ---    Colorectal Cancer Screening 07/11/2015 7/11/2014    Lipid Panel 06/27/2020 6/27/2019    Hemoglobin A1c 09/26/2020 6/26/2020    Eye Exam 09/27/2020 9/27/2019    Foot Exam 05/21/2021 5/21/2020    High Dose Statin 10/07/2021 10/7/2020    TETANUS VACCINE 09/25/2029 9/25/2019              Patient note was created using [employer].  Any errors in syntax or even information may not have been identified and edited on initial review prior to signing this note.

## 2021-01-06 DIAGNOSIS — E11.9 CONTROLLED TYPE 2 DIABETES MELLITUS WITHOUT COMPLICATION, WITHOUT LONG-TERM CURRENT USE OF INSULIN: ICD-10-CM

## 2021-01-20 DIAGNOSIS — E11.9 CONTROLLED TYPE 2 DIABETES MELLITUS WITHOUT COMPLICATION, WITHOUT LONG-TERM CURRENT USE OF INSULIN: ICD-10-CM

## 2021-01-27 DIAGNOSIS — E11.9 CONTROLLED TYPE 2 DIABETES MELLITUS WITHOUT COMPLICATION, WITHOUT LONG-TERM CURRENT USE OF INSULIN: ICD-10-CM

## 2021-02-03 DIAGNOSIS — E11.9 CONTROLLED TYPE 2 DIABETES MELLITUS WITHOUT COMPLICATION, WITHOUT LONG-TERM CURRENT USE OF INSULIN: ICD-10-CM

## 2021-02-10 DIAGNOSIS — E11.9 CONTROLLED TYPE 2 DIABETES MELLITUS WITHOUT COMPLICATION, WITHOUT LONG-TERM CURRENT USE OF INSULIN: ICD-10-CM

## 2021-02-17 DIAGNOSIS — E11.9 CONTROLLED TYPE 2 DIABETES MELLITUS WITHOUT COMPLICATION, WITHOUT LONG-TERM CURRENT USE OF INSULIN: ICD-10-CM

## 2021-02-24 DIAGNOSIS — E11.9 CONTROLLED TYPE 2 DIABETES MELLITUS WITHOUT COMPLICATION, WITHOUT LONG-TERM CURRENT USE OF INSULIN: ICD-10-CM

## 2021-03-02 DIAGNOSIS — F51.02 TRANSIENT INSOMNIA: ICD-10-CM

## 2021-03-02 DIAGNOSIS — F41.9 ANXIETY: ICD-10-CM

## 2021-03-02 RX ORDER — ALPRAZOLAM 0.5 MG/1
TABLET ORAL
Qty: 90 TABLET | Refills: 0 | Status: CANCELLED | OUTPATIENT
Start: 2021-03-14

## 2021-03-02 RX ORDER — ALPRAZOLAM 0.5 MG/1
TABLET ORAL
Qty: 90 TABLET | Refills: 0 | Status: SHIPPED | OUTPATIENT
Start: 2021-03-14 | End: 2021-04-06 | Stop reason: SDUPTHER

## 2021-03-02 RX ORDER — ZOLPIDEM TARTRATE 10 MG/1
10 TABLET ORAL NIGHTLY
Qty: 30 TABLET | Refills: 0 | Status: SHIPPED | OUTPATIENT
Start: 2021-03-02 | End: 2021-04-06 | Stop reason: SDUPTHER

## 2021-03-02 RX ORDER — ZOLPIDEM TARTRATE 10 MG/1
10 TABLET ORAL NIGHTLY
Qty: 30 TABLET | Refills: 0 | Status: CANCELLED | OUTPATIENT
Start: 2021-03-02

## 2021-03-03 DIAGNOSIS — E11.9 CONTROLLED TYPE 2 DIABETES MELLITUS WITHOUT COMPLICATION, WITHOUT LONG-TERM CURRENT USE OF INSULIN: ICD-10-CM

## 2021-03-10 DIAGNOSIS — E11.9 CONTROLLED TYPE 2 DIABETES MELLITUS WITHOUT COMPLICATION, WITHOUT LONG-TERM CURRENT USE OF INSULIN: ICD-10-CM

## 2021-03-23 ENCOUNTER — PATIENT OUTREACH (OUTPATIENT)
Dept: ADMINISTRATIVE | Facility: HOSPITAL | Age: 66
End: 2021-03-23

## 2021-03-23 DIAGNOSIS — E11.9 CONTROLLED TYPE 2 DIABETES MELLITUS WITHOUT COMPLICATION, WITHOUT LONG-TERM CURRENT USE OF INSULIN: Primary | ICD-10-CM

## 2021-03-23 DIAGNOSIS — I10 ESSENTIAL HYPERTENSION: ICD-10-CM

## 2021-03-24 DIAGNOSIS — E11.9 CONTROLLED TYPE 2 DIABETES MELLITUS WITHOUT COMPLICATION, WITHOUT LONG-TERM CURRENT USE OF INSULIN: ICD-10-CM

## 2021-03-31 DIAGNOSIS — E11.9 CONTROLLED TYPE 2 DIABETES MELLITUS WITHOUT COMPLICATION, WITHOUT LONG-TERM CURRENT USE OF INSULIN: ICD-10-CM

## 2021-04-06 ENCOUNTER — OFFICE VISIT (OUTPATIENT)
Dept: FAMILY MEDICINE | Facility: CLINIC | Age: 66
End: 2021-04-06
Payer: MEDICARE

## 2021-04-06 ENCOUNTER — LAB VISIT (OUTPATIENT)
Dept: LAB | Facility: HOSPITAL | Age: 66
End: 2021-04-06
Attending: FAMILY MEDICINE
Payer: MEDICARE

## 2021-04-06 VITALS
WEIGHT: 237.63 LBS | HEIGHT: 69 IN | DIASTOLIC BLOOD PRESSURE: 70 MMHG | TEMPERATURE: 99 F | OXYGEN SATURATION: 95 % | SYSTOLIC BLOOD PRESSURE: 134 MMHG | BODY MASS INDEX: 35.2 KG/M2 | HEART RATE: 72 BPM

## 2021-04-06 DIAGNOSIS — G89.29 CHRONIC MIDLINE LOW BACK PAIN: ICD-10-CM

## 2021-04-06 DIAGNOSIS — I70.0 ATHEROSCLEROSIS OF AORTA: ICD-10-CM

## 2021-04-06 DIAGNOSIS — E11.9 TYPE 2 DIABETES MELLITUS WITHOUT COMPLICATION: ICD-10-CM

## 2021-04-06 DIAGNOSIS — I20.89 ANGINA AT REST: ICD-10-CM

## 2021-04-06 DIAGNOSIS — M54.50 CHRONIC MIDLINE LOW BACK PAIN: ICD-10-CM

## 2021-04-06 DIAGNOSIS — I42.9 CARDIOMYOPATHY, UNSPECIFIED TYPE: ICD-10-CM

## 2021-04-06 DIAGNOSIS — I10 ESSENTIAL HYPERTENSION: Chronic | ICD-10-CM

## 2021-04-06 DIAGNOSIS — E11.9 CONTROLLED TYPE 2 DIABETES MELLITUS WITHOUT COMPLICATION, WITHOUT LONG-TERM CURRENT USE OF INSULIN: ICD-10-CM

## 2021-04-06 DIAGNOSIS — F41.9 ANXIETY: ICD-10-CM

## 2021-04-06 DIAGNOSIS — F51.02 TRANSIENT INSOMNIA: ICD-10-CM

## 2021-04-06 LAB
ESTIMATED AVG GLUCOSE: 126 MG/DL (ref 68–131)
HBA1C MFR BLD: 6 % (ref 4–5.6)

## 2021-04-06 PROCEDURE — 99999 PR PBB SHADOW E&M-EST. PATIENT-LVL III: ICD-10-PCS | Mod: PBBFAC,,, | Performed by: FAMILY MEDICINE

## 2021-04-06 PROCEDURE — 99499 RISK ADDL DX/OHS AUDIT: ICD-10-PCS | Mod: S$GLB,,, | Performed by: FAMILY MEDICINE

## 2021-04-06 PROCEDURE — 3075F SYST BP GE 130 - 139MM HG: CPT | Mod: CPTII,S$GLB,, | Performed by: FAMILY MEDICINE

## 2021-04-06 PROCEDURE — 99999 PR PBB SHADOW E&M-EST. PATIENT-LVL III: CPT | Mod: PBBFAC,,, | Performed by: FAMILY MEDICINE

## 2021-04-06 PROCEDURE — 36415 COLL VENOUS BLD VENIPUNCTURE: CPT | Mod: PO | Performed by: FAMILY MEDICINE

## 2021-04-06 PROCEDURE — 99214 OFFICE O/P EST MOD 30 MIN: CPT | Mod: S$GLB,,, | Performed by: FAMILY MEDICINE

## 2021-04-06 PROCEDURE — 1125F PR PAIN SEVERITY QUANTIFIED, PAIN PRESENT: ICD-10-PCS | Mod: S$GLB,,, | Performed by: FAMILY MEDICINE

## 2021-04-06 PROCEDURE — 3078F PR MOST RECENT DIASTOLIC BLOOD PRESSURE < 80 MM HG: ICD-10-PCS | Mod: CPTII,S$GLB,, | Performed by: FAMILY MEDICINE

## 2021-04-06 PROCEDURE — 3078F DIAST BP <80 MM HG: CPT | Mod: CPTII,S$GLB,, | Performed by: FAMILY MEDICINE

## 2021-04-06 PROCEDURE — 1125F AMNT PAIN NOTED PAIN PRSNT: CPT | Mod: S$GLB,,, | Performed by: FAMILY MEDICINE

## 2021-04-06 PROCEDURE — 3044F PR MOST RECENT HEMOGLOBIN A1C LEVEL <7.0%: ICD-10-PCS | Mod: CPTII,S$GLB,, | Performed by: FAMILY MEDICINE

## 2021-04-06 PROCEDURE — 3008F PR BODY MASS INDEX (BMI) DOCUMENTED: ICD-10-PCS | Mod: CPTII,S$GLB,, | Performed by: FAMILY MEDICINE

## 2021-04-06 PROCEDURE — 3044F HG A1C LEVEL LT 7.0%: CPT | Mod: CPTII,S$GLB,, | Performed by: FAMILY MEDICINE

## 2021-04-06 PROCEDURE — 3075F PR MOST RECENT SYSTOLIC BLOOD PRESS GE 130-139MM HG: ICD-10-PCS | Mod: CPTII,S$GLB,, | Performed by: FAMILY MEDICINE

## 2021-04-06 PROCEDURE — 99499 UNLISTED E&M SERVICE: CPT | Mod: S$GLB,,, | Performed by: FAMILY MEDICINE

## 2021-04-06 PROCEDURE — 83036 HEMOGLOBIN GLYCOSYLATED A1C: CPT | Performed by: FAMILY MEDICINE

## 2021-04-06 PROCEDURE — 99214 PR OFFICE/OUTPT VISIT, EST, LEVL IV, 30-39 MIN: ICD-10-PCS | Mod: S$GLB,,, | Performed by: FAMILY MEDICINE

## 2021-04-06 PROCEDURE — 3008F BODY MASS INDEX DOCD: CPT | Mod: CPTII,S$GLB,, | Performed by: FAMILY MEDICINE

## 2021-04-06 RX ORDER — NAPROXEN 500 MG/1
500 TABLET ORAL 2 TIMES DAILY WITH MEALS
Qty: 60 TABLET | Refills: 0 | Status: SHIPPED | OUTPATIENT
Start: 2021-04-06 | End: 2021-11-29 | Stop reason: SDUPTHER

## 2021-04-06 RX ORDER — HYDROCODONE BITARTRATE AND ACETAMINOPHEN 7.5; 325 MG/1; MG/1
1 TABLET ORAL EVERY 6 HOURS PRN
Qty: 30 TABLET | Refills: 0 | Status: SHIPPED | OUTPATIENT
Start: 2021-04-06 | End: 2021-08-24 | Stop reason: SDUPTHER

## 2021-04-06 RX ORDER — ALPRAZOLAM 0.5 MG/1
TABLET ORAL
Qty: 90 TABLET | Refills: 2 | Status: SHIPPED | OUTPATIENT
Start: 2021-04-13 | End: 2021-07-13

## 2021-04-06 RX ORDER — ZOLPIDEM TARTRATE 10 MG/1
10 TABLET ORAL NIGHTLY
Qty: 30 TABLET | Refills: 0 | Status: SHIPPED | OUTPATIENT
Start: 2021-04-12 | End: 2021-05-12

## 2021-04-07 DIAGNOSIS — E11.9 CONTROLLED TYPE 2 DIABETES MELLITUS WITHOUT COMPLICATION, WITHOUT LONG-TERM CURRENT USE OF INSULIN: ICD-10-CM

## 2021-04-14 DIAGNOSIS — E11.9 CONTROLLED TYPE 2 DIABETES MELLITUS WITHOUT COMPLICATION, WITHOUT LONG-TERM CURRENT USE OF INSULIN: ICD-10-CM

## 2021-04-21 DIAGNOSIS — E11.9 CONTROLLED TYPE 2 DIABETES MELLITUS WITHOUT COMPLICATION, WITHOUT LONG-TERM CURRENT USE OF INSULIN: ICD-10-CM

## 2021-04-28 DIAGNOSIS — E11.9 CONTROLLED TYPE 2 DIABETES MELLITUS WITHOUT COMPLICATION, WITHOUT LONG-TERM CURRENT USE OF INSULIN: ICD-10-CM

## 2021-05-05 DIAGNOSIS — E11.9 CONTROLLED TYPE 2 DIABETES MELLITUS WITHOUT COMPLICATION, WITHOUT LONG-TERM CURRENT USE OF INSULIN: ICD-10-CM

## 2021-05-12 DIAGNOSIS — E11.9 CONTROLLED TYPE 2 DIABETES MELLITUS WITHOUT COMPLICATION, WITHOUT LONG-TERM CURRENT USE OF INSULIN: ICD-10-CM

## 2021-05-12 DIAGNOSIS — E11.9 TYPE 2 DIABETES MELLITUS WITHOUT COMPLICATION: ICD-10-CM

## 2021-05-12 DIAGNOSIS — F51.02 TRANSIENT INSOMNIA: ICD-10-CM

## 2021-05-12 RX ORDER — ZOLPIDEM TARTRATE 10 MG/1
10 TABLET ORAL NIGHTLY
Qty: 30 TABLET | Refills: 0 | Status: CANCELLED | OUTPATIENT
Start: 2021-05-12

## 2021-05-13 ENCOUNTER — TELEPHONE (OUTPATIENT)
Dept: FAMILY MEDICINE | Facility: CLINIC | Age: 66
End: 2021-05-13

## 2021-05-27 ENCOUNTER — PES CALL (OUTPATIENT)
Dept: ADMINISTRATIVE | Facility: CLINIC | Age: 66
End: 2021-05-27

## 2021-06-16 ENCOUNTER — TELEPHONE (OUTPATIENT)
Dept: FAMILY MEDICINE | Facility: CLINIC | Age: 66
End: 2021-06-16

## 2021-07-06 ENCOUNTER — TELEPHONE (OUTPATIENT)
Dept: FAMILY MEDICINE | Facility: CLINIC | Age: 66
End: 2021-07-06

## 2021-07-15 DIAGNOSIS — E11.9 TYPE 2 DIABETES MELLITUS WITHOUT COMPLICATION: ICD-10-CM

## 2021-08-04 DIAGNOSIS — Z12.11 COLON CANCER SCREENING: ICD-10-CM

## 2021-08-24 ENCOUNTER — OFFICE VISIT (OUTPATIENT)
Dept: FAMILY MEDICINE | Facility: CLINIC | Age: 66
End: 2021-08-24
Payer: MEDICARE

## 2021-08-24 VITALS
OXYGEN SATURATION: 95 % | HEIGHT: 69 IN | SYSTOLIC BLOOD PRESSURE: 124 MMHG | WEIGHT: 246.5 LBS | DIASTOLIC BLOOD PRESSURE: 72 MMHG | HEART RATE: 72 BPM | BODY MASS INDEX: 36.51 KG/M2 | TEMPERATURE: 99 F

## 2021-08-24 DIAGNOSIS — F51.02 TRANSIENT INSOMNIA: ICD-10-CM

## 2021-08-24 DIAGNOSIS — G89.29 CHRONIC MIDLINE LOW BACK PAIN: ICD-10-CM

## 2021-08-24 DIAGNOSIS — F41.9 ANXIETY: ICD-10-CM

## 2021-08-24 DIAGNOSIS — M54.50 CHRONIC MIDLINE LOW BACK PAIN: ICD-10-CM

## 2021-08-24 DIAGNOSIS — E11.9 CONTROLLED TYPE 2 DIABETES MELLITUS WITHOUT COMPLICATION, WITHOUT LONG-TERM CURRENT USE OF INSULIN: Primary | ICD-10-CM

## 2021-08-24 DIAGNOSIS — I10 ESSENTIAL HYPERTENSION: ICD-10-CM

## 2021-08-24 PROCEDURE — 3074F SYST BP LT 130 MM HG: CPT | Mod: CPTII,S$GLB,, | Performed by: FAMILY MEDICINE

## 2021-08-24 PROCEDURE — 1160F PR REVIEW ALL MEDS BY PRESCRIBER/CLIN PHARMACIST DOCUMENTED: ICD-10-PCS | Mod: CPTII,S$GLB,, | Performed by: FAMILY MEDICINE

## 2021-08-24 PROCEDURE — 1159F MED LIST DOCD IN RCRD: CPT | Mod: CPTII,S$GLB,, | Performed by: FAMILY MEDICINE

## 2021-08-24 PROCEDURE — 99499 RISK ADDL DX/OHS AUDIT: ICD-10-PCS | Mod: HCNC,S$GLB,, | Performed by: FAMILY MEDICINE

## 2021-08-24 PROCEDURE — 3074F PR MOST RECENT SYSTOLIC BLOOD PRESSURE < 130 MM HG: ICD-10-PCS | Mod: CPTII,S$GLB,, | Performed by: FAMILY MEDICINE

## 2021-08-24 PROCEDURE — 3008F PR BODY MASS INDEX (BMI) DOCUMENTED: ICD-10-PCS | Mod: CPTII,S$GLB,, | Performed by: FAMILY MEDICINE

## 2021-08-24 PROCEDURE — 3008F BODY MASS INDEX DOCD: CPT | Mod: CPTII,S$GLB,, | Performed by: FAMILY MEDICINE

## 2021-08-24 PROCEDURE — 99999 PR PBB SHADOW E&M-EST. PATIENT-LVL III: CPT | Mod: PBBFAC,,, | Performed by: FAMILY MEDICINE

## 2021-08-24 PROCEDURE — 1125F PR PAIN SEVERITY QUANTIFIED, PAIN PRESENT: ICD-10-PCS | Mod: CPTII,S$GLB,, | Performed by: FAMILY MEDICINE

## 2021-08-24 PROCEDURE — 99215 PR OFFICE/OUTPT VISIT, EST, LEVL V, 40-54 MIN: ICD-10-PCS | Mod: S$GLB,,, | Performed by: FAMILY MEDICINE

## 2021-08-24 PROCEDURE — 1160F RVW MEDS BY RX/DR IN RCRD: CPT | Mod: CPTII,S$GLB,, | Performed by: FAMILY MEDICINE

## 2021-08-24 PROCEDURE — 99499 UNLISTED E&M SERVICE: CPT | Mod: HCNC,S$GLB,, | Performed by: FAMILY MEDICINE

## 2021-08-24 PROCEDURE — 3078F DIAST BP <80 MM HG: CPT | Mod: CPTII,S$GLB,, | Performed by: FAMILY MEDICINE

## 2021-08-24 PROCEDURE — 3044F PR MOST RECENT HEMOGLOBIN A1C LEVEL <7.0%: ICD-10-PCS | Mod: CPTII,S$GLB,, | Performed by: FAMILY MEDICINE

## 2021-08-24 PROCEDURE — 99215 OFFICE O/P EST HI 40 MIN: CPT | Mod: S$GLB,,, | Performed by: FAMILY MEDICINE

## 2021-08-24 PROCEDURE — 3044F HG A1C LEVEL LT 7.0%: CPT | Mod: CPTII,S$GLB,, | Performed by: FAMILY MEDICINE

## 2021-08-24 PROCEDURE — 99999 PR PBB SHADOW E&M-EST. PATIENT-LVL III: ICD-10-PCS | Mod: PBBFAC,,, | Performed by: FAMILY MEDICINE

## 2021-08-24 PROCEDURE — 1125F AMNT PAIN NOTED PAIN PRSNT: CPT | Mod: CPTII,S$GLB,, | Performed by: FAMILY MEDICINE

## 2021-08-24 PROCEDURE — 1159F PR MEDICATION LIST DOCUMENTED IN MEDICAL RECORD: ICD-10-PCS | Mod: CPTII,S$GLB,, | Performed by: FAMILY MEDICINE

## 2021-08-24 PROCEDURE — 3078F PR MOST RECENT DIASTOLIC BLOOD PRESSURE < 80 MM HG: ICD-10-PCS | Mod: CPTII,S$GLB,, | Performed by: FAMILY MEDICINE

## 2021-08-24 RX ORDER — METOPROLOL SUCCINATE 25 MG/1
25 TABLET, EXTENDED RELEASE ORAL DAILY
Qty: 90 TABLET | Refills: 1 | Status: SHIPPED | OUTPATIENT
Start: 2021-08-24 | End: 2021-11-29 | Stop reason: SDUPTHER

## 2021-08-24 RX ORDER — METFORMIN HYDROCHLORIDE 1000 MG/1
TABLET ORAL
Qty: 180 TABLET | Refills: 0 | Status: SHIPPED | OUTPATIENT
Start: 2021-08-24 | End: 2021-11-29 | Stop reason: SDUPTHER

## 2021-08-24 RX ORDER — LISINOPRIL 40 MG/1
40 TABLET ORAL DAILY
Qty: 90 TABLET | Refills: 1 | Status: SHIPPED | OUTPATIENT
Start: 2021-08-24 | End: 2021-11-29 | Stop reason: SDUPTHER

## 2021-08-24 RX ORDER — ZOLPIDEM TARTRATE 10 MG/1
10 TABLET ORAL NIGHTLY
Qty: 30 TABLET | Refills: 2 | Status: SHIPPED | OUTPATIENT
Start: 2021-08-24 | End: 2021-11-29 | Stop reason: SDUPTHER

## 2021-08-24 RX ORDER — HYDROCODONE BITARTRATE AND ACETAMINOPHEN 7.5; 325 MG/1; MG/1
1 TABLET ORAL EVERY 6 HOURS PRN
Qty: 20 TABLET | Refills: 0 | Status: SHIPPED | OUTPATIENT
Start: 2021-08-24 | End: 2021-08-24 | Stop reason: SDUPTHER

## 2021-08-24 RX ORDER — ALPRAZOLAM 0.5 MG/1
TABLET ORAL
Qty: 90 TABLET | Refills: 2 | Status: SHIPPED | OUTPATIENT
Start: 2021-08-24 | End: 2022-01-19

## 2021-08-24 RX ORDER — HYDROCODONE BITARTRATE AND ACETAMINOPHEN 7.5; 325 MG/1; MG/1
1 TABLET ORAL EVERY 6 HOURS PRN
Qty: 30 TABLET | Refills: 0 | Status: SHIPPED | OUTPATIENT
Start: 2021-08-24 | End: 2021-11-29 | Stop reason: SDUPTHER

## 2021-09-17 ENCOUNTER — PES CALL (OUTPATIENT)
Dept: ADMINISTRATIVE | Facility: CLINIC | Age: 66
End: 2021-09-17

## 2021-11-29 ENCOUNTER — OFFICE VISIT (OUTPATIENT)
Dept: FAMILY MEDICINE | Facility: CLINIC | Age: 66
End: 2021-11-29
Payer: MEDICARE

## 2021-11-29 ENCOUNTER — LAB VISIT (OUTPATIENT)
Dept: LAB | Facility: HOSPITAL | Age: 66
End: 2021-11-29
Attending: FAMILY MEDICINE
Payer: MEDICARE

## 2021-11-29 VITALS
TEMPERATURE: 98 F | OXYGEN SATURATION: 96 % | BODY MASS INDEX: 37.33 KG/M2 | HEIGHT: 69 IN | DIASTOLIC BLOOD PRESSURE: 76 MMHG | WEIGHT: 252 LBS | HEART RATE: 69 BPM | SYSTOLIC BLOOD PRESSURE: 136 MMHG

## 2021-11-29 DIAGNOSIS — I10 ESSENTIAL HYPERTENSION: ICD-10-CM

## 2021-11-29 DIAGNOSIS — M54.50 CHRONIC MIDLINE LOW BACK PAIN: ICD-10-CM

## 2021-11-29 DIAGNOSIS — E11.9 CONTROLLED TYPE 2 DIABETES MELLITUS WITHOUT COMPLICATION, WITHOUT LONG-TERM CURRENT USE OF INSULIN: ICD-10-CM

## 2021-11-29 DIAGNOSIS — E11.9 TYPE 2 DIABETES MELLITUS WITHOUT COMPLICATION: ICD-10-CM

## 2021-11-29 DIAGNOSIS — G89.29 CHRONIC MIDLINE LOW BACK PAIN: ICD-10-CM

## 2021-11-29 DIAGNOSIS — F51.02 TRANSIENT INSOMNIA: ICD-10-CM

## 2021-11-29 LAB
ALBUMIN SERPL BCP-MCNC: 3.9 G/DL (ref 3.5–5.2)
ALP SERPL-CCNC: 76 U/L (ref 55–135)
ALT SERPL W/O P-5'-P-CCNC: 21 U/L (ref 10–44)
ANION GAP SERPL CALC-SCNC: 7 MMOL/L (ref 8–16)
AST SERPL-CCNC: 16 U/L (ref 10–40)
BILIRUB SERPL-MCNC: 0.7 MG/DL (ref 0.1–1)
BUN SERPL-MCNC: 12 MG/DL (ref 8–23)
CALCIUM SERPL-MCNC: 9.4 MG/DL (ref 8.7–10.5)
CHLORIDE SERPL-SCNC: 104 MMOL/L (ref 95–110)
CO2 SERPL-SCNC: 27 MMOL/L (ref 23–29)
CREAT SERPL-MCNC: 1 MG/DL (ref 0.5–1.4)
EST. GFR  (AFRICAN AMERICAN): >60 ML/MIN/1.73 M^2
EST. GFR  (NON AFRICAN AMERICAN): >60 ML/MIN/1.73 M^2
ESTIMATED AVG GLUCOSE: 134 MG/DL (ref 68–131)
GLUCOSE SERPL-MCNC: 126 MG/DL (ref 70–110)
HBA1C MFR BLD: 6.3 % (ref 4–5.6)
POTASSIUM SERPL-SCNC: 4.5 MMOL/L (ref 3.5–5.1)
PROT SERPL-MCNC: 7.6 G/DL (ref 6–8.4)
SODIUM SERPL-SCNC: 138 MMOL/L (ref 136–145)

## 2021-11-29 PROCEDURE — 99499 RISK ADDL DX/OHS AUDIT: ICD-10-PCS | Mod: HCNC,S$GLB,, | Performed by: FAMILY MEDICINE

## 2021-11-29 PROCEDURE — 4010F PR ACE/ARB THEARPY RXD/TAKEN: ICD-10-PCS | Mod: HCNC,CPTII,S$GLB, | Performed by: FAMILY MEDICINE

## 2021-11-29 PROCEDURE — 99999 PR PBB SHADOW E&M-EST. PATIENT-LVL IV: ICD-10-PCS | Mod: PBBFAC,HCNC,, | Performed by: FAMILY MEDICINE

## 2021-11-29 PROCEDURE — 4010F ACE/ARB THERAPY RXD/TAKEN: CPT | Mod: HCNC,CPTII,S$GLB, | Performed by: FAMILY MEDICINE

## 2021-11-29 PROCEDURE — 36415 COLL VENOUS BLD VENIPUNCTURE: CPT | Mod: HCNC,PO | Performed by: FAMILY MEDICINE

## 2021-11-29 PROCEDURE — 99213 OFFICE O/P EST LOW 20 MIN: CPT | Mod: HCNC,S$GLB,, | Performed by: FAMILY MEDICINE

## 2021-11-29 PROCEDURE — 99999 PR PBB SHADOW E&M-EST. PATIENT-LVL IV: CPT | Mod: PBBFAC,HCNC,, | Performed by: FAMILY MEDICINE

## 2021-11-29 PROCEDURE — 99499 UNLISTED E&M SERVICE: CPT | Mod: HCNC,S$GLB,, | Performed by: FAMILY MEDICINE

## 2021-11-29 PROCEDURE — 80053 COMPREHEN METABOLIC PANEL: CPT | Mod: HCNC | Performed by: FAMILY MEDICINE

## 2021-11-29 PROCEDURE — 83036 HEMOGLOBIN GLYCOSYLATED A1C: CPT | Mod: HCNC | Performed by: FAMILY MEDICINE

## 2021-11-29 PROCEDURE — 99213 PR OFFICE/OUTPT VISIT, EST, LEVL III, 20-29 MIN: ICD-10-PCS | Mod: HCNC,S$GLB,, | Performed by: FAMILY MEDICINE

## 2021-11-29 RX ORDER — NAPROXEN 500 MG/1
500 TABLET ORAL 2 TIMES DAILY WITH MEALS
Qty: 60 TABLET | Refills: 0 | Status: SHIPPED | OUTPATIENT
Start: 2021-11-29 | End: 2022-08-26 | Stop reason: SDUPTHER

## 2021-11-29 RX ORDER — LISINOPRIL 40 MG/1
40 TABLET ORAL DAILY
Qty: 90 TABLET | Refills: 1 | Status: SHIPPED | OUTPATIENT
Start: 2021-11-29 | End: 2022-08-16

## 2021-11-29 RX ORDER — METOPROLOL SUCCINATE 25 MG/1
25 TABLET, EXTENDED RELEASE ORAL DAILY
Qty: 90 TABLET | Refills: 1 | Status: SHIPPED | OUTPATIENT
Start: 2021-11-29 | End: 2022-08-16

## 2021-11-29 RX ORDER — METFORMIN HYDROCHLORIDE 1000 MG/1
TABLET ORAL
Qty: 180 TABLET | Refills: 0 | Status: SHIPPED | OUTPATIENT
Start: 2021-11-29 | End: 2023-03-22 | Stop reason: CLARIF

## 2021-11-29 RX ORDER — HYDROCODONE BITARTRATE AND ACETAMINOPHEN 7.5; 325 MG/1; MG/1
1 TABLET ORAL EVERY 6 HOURS PRN
Qty: 30 TABLET | Refills: 0 | Status: SHIPPED | OUTPATIENT
Start: 2021-11-29 | End: 2022-02-28 | Stop reason: SDUPTHER

## 2021-11-29 RX ORDER — AMLODIPINE BESYLATE 10 MG/1
10 TABLET ORAL DAILY
Qty: 90 TABLET | Refills: 1 | Status: SHIPPED | OUTPATIENT
Start: 2021-11-29 | End: 2022-08-16

## 2021-11-29 RX ORDER — ZOLPIDEM TARTRATE 10 MG/1
10 TABLET ORAL NIGHTLY
Qty: 30 TABLET | Refills: 2 | Status: SHIPPED | OUTPATIENT
Start: 2021-11-29 | End: 2022-02-28 | Stop reason: SDUPTHER

## 2021-12-08 DIAGNOSIS — Z12.11 COLON CANCER SCREENING: ICD-10-CM

## 2022-01-17 DIAGNOSIS — F41.9 ANXIETY: ICD-10-CM

## 2022-01-19 RX ORDER — ALPRAZOLAM 0.5 MG/1
TABLET ORAL
Qty: 90 TABLET | Refills: 2 | Status: SHIPPED | OUTPATIENT
Start: 2022-01-19 | End: 2022-02-28 | Stop reason: SDUPTHER

## 2022-01-19 NOTE — TELEPHONE ENCOUNTER
Left voicemail to inform the Patient, Rx refill has been approved and sent to preferred pharmacy.  
No new care gaps identified.  Powered by [employer]. Reference number: [id number].   1/17/2022 5:19:57 PM CST  
RX refill requested:    ALPRAZolam (XANAX) 0.5 MG tablet  
<--- Click to Launch ICDx for PreOp, PostOp and Procedure

## 2022-01-21 ENCOUNTER — PATIENT OUTREACH (OUTPATIENT)
Dept: ADMINISTRATIVE | Facility: HOSPITAL | Age: 67
End: 2022-01-21
Payer: MEDICARE

## 2022-01-21 NOTE — PROGRESS NOTES
[facility] Diabetic Eye Exam gap report - no eye exam for 2020/2021. Left message for patient to call our office. Please schedule.    Overdue Diabetes labs and urine - Left message for patient to call our office. Please schedule.

## 2022-01-28 ENCOUNTER — PATIENT OUTREACH (OUTPATIENT)
Dept: ADMINISTRATIVE | Facility: HOSPITAL | Age: 67
End: 2022-01-28
Payer: MEDICARE

## 2022-02-18 ENCOUNTER — PATIENT OUTREACH (OUTPATIENT)
Dept: ADMINISTRATIVE | Facility: HOSPITAL | Age: 67
End: 2022-02-18
Payer: MEDICARE

## 2022-02-18 RX ORDER — INFLUENZA VIRUS VACCINE 15; 15; 15; 15 UG/.5ML; UG/.5ML; UG/.5ML; UG/.5ML
SUSPENSION INTRAMUSCULAR
COMMUNITY
Start: 2021-10-08 | End: 2022-08-26

## 2022-02-18 NOTE — PROGRESS NOTES
[facility] EyeCam  gap report - Left message for patient to call our office. Please schedule    Overdue Colorectal Screening and Diabetes Urine - Left message for patient to call our office. Please schedule.

## 2022-02-28 ENCOUNTER — OFFICE VISIT (OUTPATIENT)
Dept: FAMILY MEDICINE | Facility: CLINIC | Age: 67
End: 2022-02-28
Payer: MEDICARE

## 2022-02-28 ENCOUNTER — LAB VISIT (OUTPATIENT)
Dept: LAB | Facility: HOSPITAL | Age: 67
End: 2022-02-28
Attending: FAMILY MEDICINE
Payer: MEDICARE

## 2022-02-28 VITALS
RESPIRATION RATE: 16 BRPM | HEART RATE: 54 BPM | OXYGEN SATURATION: 96 % | HEIGHT: 69 IN | WEIGHT: 257.25 LBS | BODY MASS INDEX: 38.1 KG/M2 | DIASTOLIC BLOOD PRESSURE: 72 MMHG | TEMPERATURE: 99 F | SYSTOLIC BLOOD PRESSURE: 140 MMHG

## 2022-02-28 DIAGNOSIS — M54.50 CHRONIC MIDLINE LOW BACK PAIN: ICD-10-CM

## 2022-02-28 DIAGNOSIS — G89.29 CHRONIC MIDLINE LOW BACK PAIN: ICD-10-CM

## 2022-02-28 DIAGNOSIS — E11.9 CONTROLLED TYPE 2 DIABETES MELLITUS WITHOUT COMPLICATION, WITHOUT LONG-TERM CURRENT USE OF INSULIN: Primary | ICD-10-CM

## 2022-02-28 DIAGNOSIS — E11.9 CONTROLLED TYPE 2 DIABETES MELLITUS WITHOUT COMPLICATION, WITHOUT LONG-TERM CURRENT USE OF INSULIN: ICD-10-CM

## 2022-02-28 DIAGNOSIS — F41.9 ANXIETY: ICD-10-CM

## 2022-02-28 DIAGNOSIS — F51.02 TRANSIENT INSOMNIA: ICD-10-CM

## 2022-02-28 LAB
ALBUMIN/CREAT UR: 30.2 UG/MG (ref 0–30)
CREAT UR-MCNC: 43 MG/DL (ref 23–375)
ESTIMATED AVG GLUCOSE: 134 MG/DL (ref 68–131)
HBA1C MFR BLD: 6.3 % (ref 4–5.6)
MICROALBUMIN UR DL<=1MG/L-MCNC: 13 UG/ML

## 2022-02-28 PROCEDURE — 3060F POS MICROALBUMINURIA REV: CPT | Mod: HCNC,CPTII,S$GLB, | Performed by: FAMILY MEDICINE

## 2022-02-28 PROCEDURE — 3066F NEPHROPATHY DOC TX: CPT | Mod: HCNC,CPTII,S$GLB, | Performed by: FAMILY MEDICINE

## 2022-02-28 PROCEDURE — 4010F ACE/ARB THERAPY RXD/TAKEN: CPT | Mod: HCNC,CPTII,S$GLB, | Performed by: FAMILY MEDICINE

## 2022-02-28 PROCEDURE — 99999 PR PBB SHADOW E&M-EST. PATIENT-LVL IV: CPT | Mod: PBBFAC,HCNC,, | Performed by: FAMILY MEDICINE

## 2022-02-28 PROCEDURE — 3077F SYST BP >= 140 MM HG: CPT | Mod: HCNC,CPTII,S$GLB, | Performed by: FAMILY MEDICINE

## 2022-02-28 PROCEDURE — 3078F DIAST BP <80 MM HG: CPT | Mod: HCNC,CPTII,S$GLB, | Performed by: FAMILY MEDICINE

## 2022-02-28 PROCEDURE — 1159F PR MEDICATION LIST DOCUMENTED IN MEDICAL RECORD: ICD-10-PCS | Mod: HCNC,CPTII,S$GLB, | Performed by: FAMILY MEDICINE

## 2022-02-28 PROCEDURE — 99215 OFFICE O/P EST HI 40 MIN: CPT | Mod: HCNC,S$GLB,, | Performed by: FAMILY MEDICINE

## 2022-02-28 PROCEDURE — 3066F PR DOCUMENTATION OF TREATMENT FOR NEPHROPATHY: ICD-10-PCS | Mod: HCNC,CPTII,S$GLB, | Performed by: FAMILY MEDICINE

## 2022-02-28 PROCEDURE — 99999 PR PBB SHADOW E&M-EST. PATIENT-LVL IV: ICD-10-PCS | Mod: PBBFAC,HCNC,, | Performed by: FAMILY MEDICINE

## 2022-02-28 PROCEDURE — 4010F PR ACE/ARB THEARPY RXD/TAKEN: ICD-10-PCS | Mod: HCNC,CPTII,S$GLB, | Performed by: FAMILY MEDICINE

## 2022-02-28 PROCEDURE — 3008F BODY MASS INDEX DOCD: CPT | Mod: HCNC,CPTII,S$GLB, | Performed by: FAMILY MEDICINE

## 2022-02-28 PROCEDURE — 36415 COLL VENOUS BLD VENIPUNCTURE: CPT | Mod: HCNC,PO | Performed by: FAMILY MEDICINE

## 2022-02-28 PROCEDURE — 99215 PR OFFICE/OUTPT VISIT, EST, LEVL V, 40-54 MIN: ICD-10-PCS | Mod: HCNC,S$GLB,, | Performed by: FAMILY MEDICINE

## 2022-02-28 PROCEDURE — 3060F PR POS MICROALBUMINURIA RESULT DOCUMENTED/REVIEW: ICD-10-PCS | Mod: HCNC,CPTII,S$GLB, | Performed by: FAMILY MEDICINE

## 2022-02-28 PROCEDURE — 3077F PR MOST RECENT SYSTOLIC BLOOD PRESSURE >= 140 MM HG: ICD-10-PCS | Mod: HCNC,CPTII,S$GLB, | Performed by: FAMILY MEDICINE

## 2022-02-28 PROCEDURE — 1160F PR REVIEW ALL MEDS BY PRESCRIBER/CLIN PHARMACIST DOCUMENTED: ICD-10-PCS | Mod: HCNC,CPTII,S$GLB, | Performed by: FAMILY MEDICINE

## 2022-02-28 PROCEDURE — 1160F RVW MEDS BY RX/DR IN RCRD: CPT | Mod: HCNC,CPTII,S$GLB, | Performed by: FAMILY MEDICINE

## 2022-02-28 PROCEDURE — 3008F PR BODY MASS INDEX (BMI) DOCUMENTED: ICD-10-PCS | Mod: HCNC,CPTII,S$GLB, | Performed by: FAMILY MEDICINE

## 2022-02-28 PROCEDURE — 3044F HG A1C LEVEL LT 7.0%: CPT | Mod: HCNC,CPTII,S$GLB, | Performed by: FAMILY MEDICINE

## 2022-02-28 PROCEDURE — 82043 UR ALBUMIN QUANTITATIVE: CPT | Mod: HCNC | Performed by: FAMILY MEDICINE

## 2022-02-28 PROCEDURE — 82570 ASSAY OF URINE CREATININE: CPT | Mod: HCNC | Performed by: FAMILY MEDICINE

## 2022-02-28 PROCEDURE — 3044F PR MOST RECENT HEMOGLOBIN A1C LEVEL <7.0%: ICD-10-PCS | Mod: HCNC,CPTII,S$GLB, | Performed by: FAMILY MEDICINE

## 2022-02-28 PROCEDURE — 3078F PR MOST RECENT DIASTOLIC BLOOD PRESSURE < 80 MM HG: ICD-10-PCS | Mod: HCNC,CPTII,S$GLB, | Performed by: FAMILY MEDICINE

## 2022-02-28 PROCEDURE — 1159F MED LIST DOCD IN RCRD: CPT | Mod: HCNC,CPTII,S$GLB, | Performed by: FAMILY MEDICINE

## 2022-02-28 PROCEDURE — 83036 HEMOGLOBIN GLYCOSYLATED A1C: CPT | Mod: HCNC | Performed by: FAMILY MEDICINE

## 2022-02-28 RX ORDER — HYDROCODONE BITARTRATE AND ACETAMINOPHEN 7.5; 325 MG/1; MG/1
1 TABLET ORAL EVERY 6 HOURS PRN
Qty: 30 TABLET | Refills: 0 | Status: SHIPPED | OUTPATIENT
Start: 2022-02-28 | End: 2022-05-25 | Stop reason: SDUPTHER

## 2022-02-28 RX ORDER — ALPRAZOLAM 0.5 MG/1
0.5 TABLET ORAL 3 TIMES DAILY PRN
Qty: 90 TABLET | Refills: 2 | Status: SHIPPED | OUTPATIENT
Start: 2022-02-28 | End: 2022-05-25 | Stop reason: SDUPTHER

## 2022-02-28 RX ORDER — DULAGLUTIDE 0.75 MG/.5ML
0.75 INJECTION, SOLUTION SUBCUTANEOUS
Qty: 12 PEN | Refills: 1 | Status: SHIPPED | OUTPATIENT
Start: 2022-02-28 | End: 2023-02-28

## 2022-02-28 RX ORDER — ZOLPIDEM TARTRATE 10 MG/1
10 TABLET ORAL NIGHTLY
Qty: 30 TABLET | Refills: 2 | Status: SHIPPED | OUTPATIENT
Start: 2022-02-28 | End: 2022-05-25 | Stop reason: SDUPTHER

## 2022-03-16 DIAGNOSIS — E11.9 TYPE 2 DIABETES MELLITUS WITHOUT COMPLICATION: ICD-10-CM

## 2022-03-21 NOTE — PROGRESS NOTES
Assessment & Plan  Problem List Items Addressed This Visit        Psychiatric    Anxiety    Relevant Medications    ALPRAZolam (XANAX) 0.5 MG tablet       Endocrine    Controlled type 2 diabetes mellitus without complication, without long-term current use of insulin - Primary    Relevant Medications    dulaglutide (TRULICITY) 0.75 mg/0.5 mL pen injector    Other Relevant Orders    Hemoglobin A1C (Completed)    Microalbumin/Creatinine Ratio, Urine (Completed)      Other Visit Diagnoses     Chronic midline low back pain        Relevant Medications    HYDROcodone-acetaminophen (NORCO) 7.5-325 mg per tablet    Transient insomnia        Relevant Medications    zolpidem (AMBIEN) 10 mg Tab         evaluated     Health Maintenance reviewed.    Follow-up: No follow-ups on file.    ______________________________________________________________________    Chief Complaint  Chief Complaint   Patient presents with    Diabetes       HPI  Eyad Rodriguez is a 66 y.o. male with multiple medical diagnoses as listed in the medical history and problem list that presents for diabetes.  Pt is known to me with last appointment 11/29/2021.    Patient denies any new symptoms including chest pain, SOB, blurry vision, N/V, diarrhea.  Diabetes: The patient reports that they  check blood sugars only occasionally. Blood sugar results are labile.  > 200 The patient  denies any problems with low blood sugars. The patient  reports that they have been complaint with current treatment plan (diet, exercise, medication).  Hypertension: The patient reports that they check their blood pressures regularly and blood pressure is generally well controlled (<= 139/89).  The patient  is not enrolled in the digital hypertension program. The patient denies  cardiac chest pain, shortness of breath, lower extremity edema, headaches and side effects of medication. The patient reports problems with  none. The patient  has been compliant with the current  medication regimen.  The patient : tries to follow a low salt diet.        PAST MEDICAL HISTORY:  Past Medical History:   Diagnosis Date    Angina pectoris     Anxiety     BPH (benign prostatic hypertrophy)     Chronic low back pain     followed by pain management    Disorder of kidney and ureter     DJD (degenerative joint disease) of knee     Heart failure     Hemorrhoid     HTN (hypertension)     Insomnia     Obesity     Personal history of kidney stones     Tobacco abuse     Type 2 diabetes mellitus        PAST SURGICAL HISTORY:  Past Surgical History:   Procedure Laterality Date    cyst removed from back         SOCIAL HISTORY:  Social History     Socioeconomic History    Marital status:     Number of children: 1   Occupational History    Occupation:    Tobacco Use    Smoking status: Former Smoker     Types: Cigars    Smokeless tobacco: Current User    Tobacco comment: quit 10 yrs ago   Substance and Sexual Activity    Alcohol use: No     Alcohol/week: 1.7 standard drinks     Types: 2 Standard drinks or equivalent per week    Drug use: No    Sexual activity: Not Currently       FAMILY HISTORY:  Family History   Problem Relation Age of Onset    Hypertension Mother     Anxiety disorder Mother     Diverticulosis Mother     Irritable bowel syndrome Mother     Hypertension Father     Anxiety disorder Father     Anxiety disorder Sister     Colon polyps Brother     Colon cancer Neg Hx     Cirrhosis Neg Hx     Liver cancer Neg Hx     Celiac disease Neg Hx     Crohn's disease Neg Hx     Ulcerative colitis Neg Hx     Esophageal cancer Neg Hx     Stomach cancer Neg Hx     Rectal cancer Neg Hx        ALLERGIES AND MEDICATIONS: updated and reviewed.  Review of patient's allergies indicates:   Allergen Reactions    Codeine      Other reaction(s): Nausea  Other reaction(s): Nausea    Cyclobenzaprine Hives and Other (See Comments)     Other reaction(s): Nausea      Current Outpatient Medications   Medication Sig Dispense Refill    amLODIPine (NORVASC) 10 MG tablet Take 1 tablet (10 mg total) by mouth once daily. 90 tablet 1    flash glucose scanning reader (FREESTYLE BRIAN 10 DAY READER) Misc 1 Units by Misc.(Non-Drug; Combo Route) route 3 (three) times daily with meals. 1 each 0    flash glucose sensor (FREESTYLE BRIAN 10 DAY SENSOR) Kit 1 Units by Misc.(Non-Drug; Combo Route) route 3 (three) times daily with meals. 1 kit 11    FLUARIX QUAD 5122-1759, PF, 60 mcg (15 mcg x 4)/0.5 mL Syrg       lisinopriL (PRINIVIL,ZESTRIL) 40 MG tablet Take 1 tablet (40 mg total) by mouth once daily. 90 tablet 1    metFORMIN (GLUCOPHAGE) 1000 MG tablet TAKE 1/2 (one-half) TABLET BY MOUTH TWICE A DAY with meals 180 tablet 0    metoprolol succinate (TOPROL-XL) 25 MG 24 hr tablet Take 1 tablet (25 mg total) by mouth once daily. 90 tablet 1    naproxen (NAPROSYN) 500 MG tablet Take 1 tablet (500 mg total) by mouth 2 (two) times daily with meals. 60 tablet 0    TRUE METRIX AIR GLUCOSE METER Misc use to test sugars THREE TIMES A DAY      TRUE METRIX GLUCOSE TEST STRIP Strp use to test blood sugars THREE TIMES A  strip 0    TRUEPLUS LANCETS 30 gauge Misc use to test sugars THREE TIMES A  each 2    ALPRAZolam (XANAX) 0.5 MG tablet Take 1 tablet (0.5 mg total) by mouth 3 (three) times daily as needed for Anxiety. 90 tablet 2    alprostadil (MUSE) 250 MCG pellet 1 each (250 mcg total) by Transurethral route as needed for Erectile Dysfunction. use no more than 3 times per week (Patient not taking: Reported on 11/29/2021) 6 each 1    dulaglutide (TRULICITY) 0.75 mg/0.5 mL pen injector Inject 0.75 mg into the skin every 7 days. 12 pen 1    HYDROcodone-acetaminophen (NORCO) 7.5-325 mg per tablet Take 1 tablet by mouth every 6 (six) hours as needed. 30 tablet 0    lancets Misc To check BG 3 times daily, to use with insurance preferred meter 300 each 3    pen needle,  "diabetic 32 gauge x 5/32" Ndle 1 Units by Misc.(Non-Drug; Combo Route) route every evening. 200 each 3    rosuvastatin (CRESTOR) 5 MG tablet Take 1 tablet (5 mg total) by mouth once daily. (Patient not taking: Reported on 11/29/2021) 90 tablet 3    zolpidem (AMBIEN) 10 mg Tab Take 1 tablet (10 mg total) by mouth nightly. 30 tablet 2     No current facility-administered medications for this visit.         ROS  Review of Systems   Constitutional: Negative for activity change, appetite change, fatigue, fever and unexpected weight change.   HENT: Negative for congestion and facial swelling.    Eyes: Negative for visual disturbance.   Respiratory: Negative for chest tightness, shortness of breath, wheezing and stridor.    Cardiovascular: Negative for chest pain, palpitations and leg swelling.   Gastrointestinal: Negative for abdominal distention, abdominal pain, constipation, diarrhea, nausea and vomiting.   Endocrine: Negative for cold intolerance, heat intolerance, polydipsia and polyuria.   Skin: Negative.    Allergic/Immunologic: Negative.    Neurological: Negative for dizziness, light-headedness, numbness and headaches.   Psychiatric/Behavioral: Negative for agitation and decreased concentration.           Physical Exam  Vitals:    02/28/22 0837   BP: (!) 140/72   Pulse: (!) 54   Resp: 16   Temp: 98.8 °F (37.1 °C)   TempSrc: Oral   SpO2: 96%   Weight: 116.7 kg (257 lb 4.4 oz)   Height: 5' 9" (1.753 m)    Body mass index is 37.99 kg/m².  Weight: 116.7 kg (257 lb 4.4 oz)   Height: 5' 9" (175.3 cm)   Physical Exam  Vitals reviewed.   Constitutional:       Appearance: He is well-developed.   HENT:      Head: Normocephalic and atraumatic.      Right Ear: External ear normal.      Left Ear: External ear normal.      Nose: Nose normal.   Eyes:      Conjunctiva/sclera: Conjunctivae normal.      Pupils: Pupils are equal, round, and reactive to light.   Cardiovascular:      Rate and Rhythm: Normal rate and regular rhythm. "      Heart sounds: Normal heart sounds.   Pulmonary:      Effort: Pulmonary effort is normal.      Breath sounds: Normal breath sounds.   Musculoskeletal:      Cervical back: Normal range of motion.   Skin:     General: Skin is warm and dry.   Neurological:      Mental Status: He is alert and oriented to person, place, and time.   Psychiatric:         Behavior: Behavior normal.           Health Maintenance       Date Due Completion Date    HIV Screening Never done ---    High Dose Statin Never done ---    Colorectal Cancer Screening Never done ---    Shingles Vaccine (1 of 2) Never done ---    Lipid Panel 06/27/2020 6/27/2019    Eye Exam 09/27/2020 9/27/2019    Foot Exam 05/21/2021 5/21/2020    Hemoglobin A1c 08/28/2022 2/28/2022    Diabetes Urine Screening 02/28/2023 2/28/2022    TETANUS VACCINE 09/25/2029 9/25/2019              Patient note was created using [employer].  Any errors in syntax or even information may not have been identified and edited on initial review prior to signing this note.

## 2022-05-04 DIAGNOSIS — E11.9 TYPE 2 DIABETES MELLITUS WITHOUT COMPLICATION, UNSPECIFIED WHETHER LONG TERM INSULIN USE: ICD-10-CM

## 2022-05-25 ENCOUNTER — OFFICE VISIT (OUTPATIENT)
Dept: FAMILY MEDICINE | Facility: CLINIC | Age: 67
End: 2022-05-25
Payer: MEDICARE

## 2022-05-25 VITALS
TEMPERATURE: 99 F | DIASTOLIC BLOOD PRESSURE: 84 MMHG | SYSTOLIC BLOOD PRESSURE: 138 MMHG | WEIGHT: 242.75 LBS | HEART RATE: 56 BPM | OXYGEN SATURATION: 96 % | BODY MASS INDEX: 35.95 KG/M2 | HEIGHT: 69 IN

## 2022-05-25 DIAGNOSIS — E11.9 CONTROLLED TYPE 2 DIABETES MELLITUS WITHOUT COMPLICATION, WITHOUT LONG-TERM CURRENT USE OF INSULIN: Primary | ICD-10-CM

## 2022-05-25 DIAGNOSIS — I20.89 ANGINA AT REST: ICD-10-CM

## 2022-05-25 DIAGNOSIS — M54.50 CHRONIC MIDLINE LOW BACK PAIN: ICD-10-CM

## 2022-05-25 DIAGNOSIS — F41.9 ANXIETY: ICD-10-CM

## 2022-05-25 DIAGNOSIS — I42.9 CARDIOMYOPATHY, UNSPECIFIED TYPE: ICD-10-CM

## 2022-05-25 DIAGNOSIS — I70.0 ATHEROSCLEROSIS OF AORTA: ICD-10-CM

## 2022-05-25 DIAGNOSIS — G89.29 CHRONIC MIDLINE LOW BACK PAIN: ICD-10-CM

## 2022-05-25 DIAGNOSIS — F51.02 TRANSIENT INSOMNIA: ICD-10-CM

## 2022-05-25 PROCEDURE — 1160F PR REVIEW ALL MEDS BY PRESCRIBER/CLIN PHARMACIST DOCUMENTED: ICD-10-PCS | Mod: CPTII,S$GLB,, | Performed by: FAMILY MEDICINE

## 2022-05-25 PROCEDURE — 99215 OFFICE O/P EST HI 40 MIN: CPT | Mod: S$GLB,,, | Performed by: FAMILY MEDICINE

## 2022-05-25 PROCEDURE — 3060F PR POS MICROALBUMINURIA RESULT DOCUMENTED/REVIEW: ICD-10-PCS | Mod: CPTII,S$GLB,, | Performed by: FAMILY MEDICINE

## 2022-05-25 PROCEDURE — 99999 PR PBB SHADOW E&M-EST. PATIENT-LVL IV: ICD-10-PCS | Mod: PBBFAC,,, | Performed by: FAMILY MEDICINE

## 2022-05-25 PROCEDURE — 4010F PR ACE/ARB THEARPY RXD/TAKEN: ICD-10-PCS | Mod: CPTII,S$GLB,, | Performed by: FAMILY MEDICINE

## 2022-05-25 PROCEDURE — 1101F PR PT FALLS ASSESS DOC 0-1 FALLS W/OUT INJ PAST YR: ICD-10-PCS | Mod: CPTII,S$GLB,, | Performed by: FAMILY MEDICINE

## 2022-05-25 PROCEDURE — 3075F PR MOST RECENT SYSTOLIC BLOOD PRESS GE 130-139MM HG: ICD-10-PCS | Mod: CPTII,S$GLB,, | Performed by: FAMILY MEDICINE

## 2022-05-25 PROCEDURE — 3044F HG A1C LEVEL LT 7.0%: CPT | Mod: CPTII,S$GLB,, | Performed by: FAMILY MEDICINE

## 2022-05-25 PROCEDURE — 3288F FALL RISK ASSESSMENT DOCD: CPT | Mod: CPTII,S$GLB,, | Performed by: FAMILY MEDICINE

## 2022-05-25 PROCEDURE — 1159F PR MEDICATION LIST DOCUMENTED IN MEDICAL RECORD: ICD-10-PCS | Mod: CPTII,S$GLB,, | Performed by: FAMILY MEDICINE

## 2022-05-25 PROCEDURE — 1101F PT FALLS ASSESS-DOCD LE1/YR: CPT | Mod: CPTII,S$GLB,, | Performed by: FAMILY MEDICINE

## 2022-05-25 PROCEDURE — 3008F BODY MASS INDEX DOCD: CPT | Mod: CPTII,S$GLB,, | Performed by: FAMILY MEDICINE

## 2022-05-25 PROCEDURE — 99499 UNLISTED E&M SERVICE: CPT | Mod: HCNC,S$GLB,, | Performed by: FAMILY MEDICINE

## 2022-05-25 PROCEDURE — 3075F SYST BP GE 130 - 139MM HG: CPT | Mod: CPTII,S$GLB,, | Performed by: FAMILY MEDICINE

## 2022-05-25 PROCEDURE — 3060F POS MICROALBUMINURIA REV: CPT | Mod: CPTII,S$GLB,, | Performed by: FAMILY MEDICINE

## 2022-05-25 PROCEDURE — 3044F PR MOST RECENT HEMOGLOBIN A1C LEVEL <7.0%: ICD-10-PCS | Mod: CPTII,S$GLB,, | Performed by: FAMILY MEDICINE

## 2022-05-25 PROCEDURE — 3079F PR MOST RECENT DIASTOLIC BLOOD PRESSURE 80-89 MM HG: ICD-10-PCS | Mod: CPTII,S$GLB,, | Performed by: FAMILY MEDICINE

## 2022-05-25 PROCEDURE — 3079F DIAST BP 80-89 MM HG: CPT | Mod: CPTII,S$GLB,, | Performed by: FAMILY MEDICINE

## 2022-05-25 PROCEDURE — 99215 PR OFFICE/OUTPT VISIT, EST, LEVL V, 40-54 MIN: ICD-10-PCS | Mod: S$GLB,,, | Performed by: FAMILY MEDICINE

## 2022-05-25 PROCEDURE — 3066F NEPHROPATHY DOC TX: CPT | Mod: CPTII,S$GLB,, | Performed by: FAMILY MEDICINE

## 2022-05-25 PROCEDURE — 3288F PR FALLS RISK ASSESSMENT DOCUMENTED: ICD-10-PCS | Mod: CPTII,S$GLB,, | Performed by: FAMILY MEDICINE

## 2022-05-25 PROCEDURE — 1159F MED LIST DOCD IN RCRD: CPT | Mod: CPTII,S$GLB,, | Performed by: FAMILY MEDICINE

## 2022-05-25 PROCEDURE — 99499 RISK ADDL DX/OHS AUDIT: ICD-10-PCS | Mod: HCNC,S$GLB,, | Performed by: FAMILY MEDICINE

## 2022-05-25 PROCEDURE — 1160F RVW MEDS BY RX/DR IN RCRD: CPT | Mod: CPTII,S$GLB,, | Performed by: FAMILY MEDICINE

## 2022-05-25 PROCEDURE — 3066F PR DOCUMENTATION OF TREATMENT FOR NEPHROPATHY: ICD-10-PCS | Mod: CPTII,S$GLB,, | Performed by: FAMILY MEDICINE

## 2022-05-25 PROCEDURE — 99999 PR PBB SHADOW E&M-EST. PATIENT-LVL IV: CPT | Mod: PBBFAC,,, | Performed by: FAMILY MEDICINE

## 2022-05-25 PROCEDURE — 4010F ACE/ARB THERAPY RXD/TAKEN: CPT | Mod: CPTII,S$GLB,, | Performed by: FAMILY MEDICINE

## 2022-05-25 PROCEDURE — 3008F PR BODY MASS INDEX (BMI) DOCUMENTED: ICD-10-PCS | Mod: CPTII,S$GLB,, | Performed by: FAMILY MEDICINE

## 2022-05-25 RX ORDER — ZOLPIDEM TARTRATE 10 MG/1
10 TABLET ORAL NIGHTLY
Qty: 30 TABLET | Refills: 2 | Status: SHIPPED | OUTPATIENT
Start: 2022-05-25 | End: 2022-10-17

## 2022-05-25 RX ORDER — ALPRAZOLAM 0.5 MG/1
0.5 TABLET ORAL 3 TIMES DAILY PRN
Qty: 90 TABLET | Refills: 2 | Status: SHIPPED | OUTPATIENT
Start: 2022-05-25 | End: 2022-08-26 | Stop reason: SDUPTHER

## 2022-05-25 RX ORDER — HYDROCODONE BITARTRATE AND ACETAMINOPHEN 7.5; 325 MG/1; MG/1
1 TABLET ORAL EVERY 6 HOURS PRN
Qty: 30 TABLET | Refills: 0 | Status: SHIPPED | OUTPATIENT
Start: 2022-05-25 | End: 2022-08-26 | Stop reason: SDUPTHER

## 2022-05-25 NOTE — PROGRESS NOTES
"  Assessment & Plan  Problem List Items Addressed This Visit        Psychiatric    Anxiety    Relevant Medications    ALPRAZolam (XANAX) 0.5 MG tablet       Cardiac/Vascular    Angina at rest    Cardiomyopathy    Atherosclerosis of aorta    Overview     "The aorta is of normal caliber and tapers appropriately, demonstrating mild calcified atheromatous disease" CT Abdomen 7-              Endocrine    Controlled type 2 diabetes mellitus without complication, without long-term current use of insulin - Primary      Other Visit Diagnoses     Transient insomnia        Relevant Medications    zolpidem (AMBIEN) 10 mg Tab    Chronic midline low back pain        Relevant Medications    HYDROcodone-acetaminophen (NORCO) 7.5-325 mg per tablet       evaluated.  Her sugars were much better with his diabetic control.  Trulicity is working well for him.  He denies any other new issues.  He does require refills his medication.  \  Patient is stable.  Assess and addressed all modifiable risk factors.  Continue with appropriate management to prevent complications.      Health Maintenance reviewed    Follow-up: No follow-ups on file.    ______________________________________________________________________    Chief Complaint  Chief Complaint   Patient presents with    Diabetes     Follow up       HPI  Eyad Rodriguez is a 67 y.o. male with multiple medical diagnoses as listed in the medical history and problem list that presents for diabetes.  Pt is known to me with last appointment 2/28/2022.    Diabetes: The patient reports that they  check blood sugars at home on a regular basis.  Blood sugar results are generally in an acceptable range (> 70 and <150). The patient  denies any problems with low blood sugars. The patient  reports that they have been complaint with current treatment plan (diet, exercise, medication).  Trulicity is working well for him.  He denies any other new issues.  He does require refills his " medication.        PAST MEDICAL HISTORY:  Past Medical History:   Diagnosis Date    Angina pectoris     Anxiety     BPH (benign prostatic hypertrophy)     Chronic low back pain     followed by pain management    Disorder of kidney and ureter     DJD (degenerative joint disease) of knee     Heart failure     Hemorrhoid     HTN (hypertension)     Insomnia     Obesity     Personal history of kidney stones     Tobacco abuse     Type 2 diabetes mellitus        PAST SURGICAL HISTORY:  Past Surgical History:   Procedure Laterality Date    cyst removed from back         SOCIAL HISTORY:  Social History     Socioeconomic History    Marital status:     Number of children: 1   Occupational History    Occupation:    Tobacco Use    Smoking status: Former Smoker     Types: Cigars    Smokeless tobacco: Current User    Tobacco comment: quit 10 yrs ago   Substance and Sexual Activity    Alcohol use: No     Alcohol/week: 1.7 standard drinks     Types: 2 Standard drinks or equivalent per week    Drug use: No    Sexual activity: Not Currently       FAMILY HISTORY:  Family History   Problem Relation Age of Onset    Hypertension Mother     Anxiety disorder Mother     Diverticulosis Mother     Irritable bowel syndrome Mother     Hypertension Father     Anxiety disorder Father     Anxiety disorder Sister     Colon polyps Brother     Colon cancer Neg Hx     Cirrhosis Neg Hx     Liver cancer Neg Hx     Celiac disease Neg Hx     Crohn's disease Neg Hx     Ulcerative colitis Neg Hx     Esophageal cancer Neg Hx     Stomach cancer Neg Hx     Rectal cancer Neg Hx        ALLERGIES AND MEDICATIONS: updated and reviewed.  Review of patient's allergies indicates:   Allergen Reactions    Codeine      Other reaction(s): Nausea  Other reaction(s): Nausea    Cyclobenzaprine Hives and Other (See Comments)     Other reaction(s): Nausea     Current Outpatient Medications   Medication Sig Dispense  "Refill    amLODIPine (NORVASC) 10 MG tablet Take 1 tablet (10 mg total) by mouth once daily. 90 tablet 1    dulaglutide (TRULICITY) 0.75 mg/0.5 mL pen injector Inject 0.75 mg into the skin every 7 days. 12 pen 1    flash glucose scanning reader (FREESTYLE BRIAN 10 DAY READER) Misc 1 Units by Misc.(Non-Drug; Combo Route) route 3 (three) times daily with meals. 1 each 0    flash glucose sensor (FREESTYLE BRIAN 10 DAY SENSOR) Kit 1 Units by Misc.(Non-Drug; Combo Route) route 3 (three) times daily with meals. 1 kit 11    FLUARIX QUAD 5263-8611, PF, 60 mcg (15 mcg x 4)/0.5 mL Syrg       lancets Misc To check BG 3 times daily, to use with insurance preferred meter 300 each 3    lisinopriL (PRINIVIL,ZESTRIL) 40 MG tablet Take 1 tablet (40 mg total) by mouth once daily. 90 tablet 1    metFORMIN (GLUCOPHAGE) 1000 MG tablet TAKE 1/2 (one-half) TABLET BY MOUTH TWICE A DAY with meals 180 tablet 0    metoprolol succinate (TOPROL-XL) 25 MG 24 hr tablet Take 1 tablet (25 mg total) by mouth once daily. 90 tablet 1    naproxen (NAPROSYN) 500 MG tablet Take 1 tablet (500 mg total) by mouth 2 (two) times daily with meals. 60 tablet 0    pen needle, diabetic 32 gauge x 5/32" Ndle 1 Units by Misc.(Non-Drug; Combo Route) route every evening. 200 each 3    TRUE METRIX AIR GLUCOSE METER Misc use to test sugars THREE TIMES A DAY      TRUE METRIX GLUCOSE TEST STRIP Strp use to test blood sugars THREE TIMES A  strip 0    TRUEPLUS LANCETS 30 gauge Misc use to test sugars THREE TIMES A  each 2    ALPRAZolam (XANAX) 0.5 MG tablet Take 1 tablet (0.5 mg total) by mouth 3 (three) times daily as needed for Anxiety. 90 tablet 2    alprostadil (MUSE) 250 MCG pellet 1 each (250 mcg total) by Transurethral route as needed for Erectile Dysfunction. use no more than 3 times per week (Patient not taking: No sig reported) 6 each 1    HYDROcodone-acetaminophen (NORCO) 7.5-325 mg per tablet Take 1 tablet by mouth every 6 (six) " "hours as needed. 30 tablet 0    rosuvastatin (CRESTOR) 5 MG tablet Take 1 tablet (5 mg total) by mouth once daily. (Patient not taking: Reported on 11/29/2021) 90 tablet 3    zolpidem (AMBIEN) 10 mg Tab Take 1 tablet (10 mg total) by mouth nightly. 30 tablet 2     No current facility-administered medications for this visit.         ROS  Review of Systems   Constitutional: Negative for activity change, appetite change, fatigue, fever and unexpected weight change.   HENT: Negative for congestion and facial swelling.    Eyes: Negative for visual disturbance.   Respiratory: Negative for chest tightness, shortness of breath, wheezing and stridor.    Cardiovascular: Negative for chest pain, palpitations and leg swelling.   Gastrointestinal: Negative for abdominal distention, abdominal pain, constipation, diarrhea, nausea and vomiting.   Endocrine: Negative for cold intolerance, heat intolerance, polydipsia and polyuria.   Musculoskeletal: Positive for arthralgias and myalgias. Negative for joint swelling.   Skin: Negative.    Allergic/Immunologic: Negative.    Neurological: Negative for dizziness, light-headedness, numbness and headaches.   Psychiatric/Behavioral: Negative for agitation and decreased concentration.           Physical Exam  Vitals:    05/25/22 0815   BP: 138/84   Pulse: (!) 56   Temp: 98.7 °F (37.1 °C)   TempSrc: Oral   SpO2: 96%   Weight: 110.1 kg (242 lb 11.6 oz)   Height: 5' 9" (1.753 m)    Body mass index is 35.84 kg/m².  Weight: 110.1 kg (242 lb 11.6 oz)   Height: 5' 9" (175.3 cm)   Physical Exam  Vitals reviewed.   Constitutional:       Appearance: He is well-developed.   HENT:      Head: Normocephalic and atraumatic.      Right Ear: External ear normal.      Left Ear: External ear normal.      Nose: Nose normal.   Eyes:      Conjunctiva/sclera: Conjunctivae normal.      Pupils: Pupils are equal, round, and reactive to light.   Cardiovascular:      Rate and Rhythm: Normal rate and regular rhythm. "      Heart sounds: Normal heart sounds.   Pulmonary:      Effort: Pulmonary effort is normal.      Breath sounds: Normal breath sounds.   Musculoskeletal:      Cervical back: Normal range of motion.   Skin:     General: Skin is warm and dry.   Neurological:      Mental Status: He is alert and oriented to person, place, and time.   Psychiatric:         Behavior: Behavior normal.           Health Maintenance       Date Due Completion Date    HIV Screening Never done ---    High Dose Statin Never done ---    Colorectal Cancer Screening Never done ---    Shingles Vaccine (1 of 2) Never done ---    Lipid Panel 06/27/2020 6/27/2019    Eye Exam 09/27/2020 9/27/2019    Foot Exam 05/21/2021 5/21/2020    COVID-19 Vaccine (4 - Booster for Moderna series) 03/29/2022 11/29/2021    Pneumococcal Vaccines (Age 65+) (2 - PPSV23 or PCV20) 05/18/2022 10/8/2015    Abdominal Aortic Aneurysm Screening 05/18/2022 7/15/2015    Hemoglobin A1c 08/28/2022 2/28/2022    Diabetes Urine Screening 02/28/2023 2/28/2022    TETANUS VACCINE 09/25/2029 9/25/2019              Patient note was created using [employer].  Any errors in syntax or even information may not have been identified and edited on initial review prior to signing this note.

## 2022-08-22 ENCOUNTER — LAB VISIT (OUTPATIENT)
Dept: LAB | Facility: HOSPITAL | Age: 67
End: 2022-08-22
Attending: FAMILY MEDICINE
Payer: MEDICARE

## 2022-08-22 DIAGNOSIS — E11.9 CONTROLLED TYPE 2 DIABETES MELLITUS WITHOUT COMPLICATION, WITHOUT LONG-TERM CURRENT USE OF INSULIN: ICD-10-CM

## 2022-08-22 LAB
ESTIMATED AVG GLUCOSE: 123 MG/DL (ref 68–131)
HBA1C MFR BLD: 5.9 % (ref 4–5.6)

## 2022-08-22 PROCEDURE — 83036 HEMOGLOBIN GLYCOSYLATED A1C: CPT | Performed by: FAMILY MEDICINE

## 2022-08-22 PROCEDURE — 36415 COLL VENOUS BLD VENIPUNCTURE: CPT | Mod: PO | Performed by: FAMILY MEDICINE

## 2022-08-26 ENCOUNTER — OFFICE VISIT (OUTPATIENT)
Dept: FAMILY MEDICINE | Facility: CLINIC | Age: 67
End: 2022-08-26
Payer: MEDICARE

## 2022-08-26 VITALS
HEIGHT: 69 IN | SYSTOLIC BLOOD PRESSURE: 132 MMHG | OXYGEN SATURATION: 96 % | WEIGHT: 245.38 LBS | BODY MASS INDEX: 36.34 KG/M2 | TEMPERATURE: 99 F | DIASTOLIC BLOOD PRESSURE: 62 MMHG | HEART RATE: 68 BPM

## 2022-08-26 DIAGNOSIS — I20.89 ANGINA AT REST: ICD-10-CM

## 2022-08-26 DIAGNOSIS — G89.29 CHRONIC MIDLINE LOW BACK PAIN: ICD-10-CM

## 2022-08-26 DIAGNOSIS — E11.9 CONTROLLED TYPE 2 DIABETES MELLITUS WITHOUT COMPLICATION, WITHOUT LONG-TERM CURRENT USE OF INSULIN: Primary | ICD-10-CM

## 2022-08-26 DIAGNOSIS — F41.9 ANXIETY: ICD-10-CM

## 2022-08-26 DIAGNOSIS — M54.50 CHRONIC MIDLINE LOW BACK PAIN: ICD-10-CM

## 2022-08-26 DIAGNOSIS — I70.0 ATHEROSCLEROSIS OF AORTA: ICD-10-CM

## 2022-08-26 PROCEDURE — 3044F HG A1C LEVEL LT 7.0%: CPT | Mod: CPTII,S$GLB,, | Performed by: FAMILY MEDICINE

## 2022-08-26 PROCEDURE — 4010F ACE/ARB THERAPY RXD/TAKEN: CPT | Mod: CPTII,S$GLB,, | Performed by: FAMILY MEDICINE

## 2022-08-26 PROCEDURE — 99999 PR PBB SHADOW E&M-EST. PATIENT-LVL III: CPT | Mod: PBBFAC,,, | Performed by: FAMILY MEDICINE

## 2022-08-26 PROCEDURE — 1159F PR MEDICATION LIST DOCUMENTED IN MEDICAL RECORD: ICD-10-PCS | Mod: CPTII,S$GLB,, | Performed by: FAMILY MEDICINE

## 2022-08-26 PROCEDURE — 3078F DIAST BP <80 MM HG: CPT | Mod: CPTII,S$GLB,, | Performed by: FAMILY MEDICINE

## 2022-08-26 PROCEDURE — 1160F PR REVIEW ALL MEDS BY PRESCRIBER/CLIN PHARMACIST DOCUMENTED: ICD-10-PCS | Mod: CPTII,S$GLB,, | Performed by: FAMILY MEDICINE

## 2022-08-26 PROCEDURE — 3075F SYST BP GE 130 - 139MM HG: CPT | Mod: CPTII,S$GLB,, | Performed by: FAMILY MEDICINE

## 2022-08-26 PROCEDURE — 4010F PR ACE/ARB THEARPY RXD/TAKEN: ICD-10-PCS | Mod: CPTII,S$GLB,, | Performed by: FAMILY MEDICINE

## 2022-08-26 PROCEDURE — 3060F PR POS MICROALBUMINURIA RESULT DOCUMENTED/REVIEW: ICD-10-PCS | Mod: CPTII,S$GLB,, | Performed by: FAMILY MEDICINE

## 2022-08-26 PROCEDURE — 3078F PR MOST RECENT DIASTOLIC BLOOD PRESSURE < 80 MM HG: ICD-10-PCS | Mod: CPTII,S$GLB,, | Performed by: FAMILY MEDICINE

## 2022-08-26 PROCEDURE — 99214 OFFICE O/P EST MOD 30 MIN: CPT | Mod: S$GLB,,, | Performed by: FAMILY MEDICINE

## 2022-08-26 PROCEDURE — 3008F BODY MASS INDEX DOCD: CPT | Mod: CPTII,S$GLB,, | Performed by: FAMILY MEDICINE

## 2022-08-26 PROCEDURE — 3075F PR MOST RECENT SYSTOLIC BLOOD PRESS GE 130-139MM HG: ICD-10-PCS | Mod: CPTII,S$GLB,, | Performed by: FAMILY MEDICINE

## 2022-08-26 PROCEDURE — 3044F PR MOST RECENT HEMOGLOBIN A1C LEVEL <7.0%: ICD-10-PCS | Mod: CPTII,S$GLB,, | Performed by: FAMILY MEDICINE

## 2022-08-26 PROCEDURE — 3066F PR DOCUMENTATION OF TREATMENT FOR NEPHROPATHY: ICD-10-PCS | Mod: CPTII,S$GLB,, | Performed by: FAMILY MEDICINE

## 2022-08-26 PROCEDURE — 99999 PR PBB SHADOW E&M-EST. PATIENT-LVL III: ICD-10-PCS | Mod: PBBFAC,,, | Performed by: FAMILY MEDICINE

## 2022-08-26 PROCEDURE — 1160F RVW MEDS BY RX/DR IN RCRD: CPT | Mod: CPTII,S$GLB,, | Performed by: FAMILY MEDICINE

## 2022-08-26 PROCEDURE — 3066F NEPHROPATHY DOC TX: CPT | Mod: CPTII,S$GLB,, | Performed by: FAMILY MEDICINE

## 2022-08-26 PROCEDURE — 1159F MED LIST DOCD IN RCRD: CPT | Mod: CPTII,S$GLB,, | Performed by: FAMILY MEDICINE

## 2022-08-26 PROCEDURE — 99214 PR OFFICE/OUTPT VISIT, EST, LEVL IV, 30-39 MIN: ICD-10-PCS | Mod: S$GLB,,, | Performed by: FAMILY MEDICINE

## 2022-08-26 PROCEDURE — 3008F PR BODY MASS INDEX (BMI) DOCUMENTED: ICD-10-PCS | Mod: CPTII,S$GLB,, | Performed by: FAMILY MEDICINE

## 2022-08-26 PROCEDURE — 3060F POS MICROALBUMINURIA REV: CPT | Mod: CPTII,S$GLB,, | Performed by: FAMILY MEDICINE

## 2022-08-26 RX ORDER — HYDROCODONE BITARTRATE AND ACETAMINOPHEN 7.5; 325 MG/1; MG/1
1 TABLET ORAL EVERY 6 HOURS PRN
Qty: 30 TABLET | Refills: 0 | Status: SHIPPED | OUTPATIENT
Start: 2022-08-26 | End: 2022-11-23 | Stop reason: SDUPTHER

## 2022-08-26 RX ORDER — ALPRAZOLAM 0.5 MG/1
0.5 TABLET ORAL 3 TIMES DAILY PRN
Qty: 90 TABLET | Refills: 2 | Status: SHIPPED | OUTPATIENT
Start: 2022-08-26 | End: 2022-11-23 | Stop reason: SDUPTHER

## 2022-08-26 RX ORDER — NAPROXEN 500 MG/1
500 TABLET ORAL 2 TIMES DAILY WITH MEALS
Qty: 60 TABLET | Refills: 0 | Status: SHIPPED | OUTPATIENT
Start: 2022-08-26 | End: 2023-02-13 | Stop reason: SDUPTHER

## 2022-08-26 NOTE — PROGRESS NOTES
"  Assessment & Plan  Problem List Items Addressed This Visit        Psychiatric    Anxiety    Relevant Medications    ALPRAZolam (XANAX) 0.5 MG tablet       Cardiac/Vascular    Angina at rest    Atherosclerosis of aorta    Overview     "The aorta is of normal caliber and tapers appropriately, demonstrating mild calcified atheromatous disease" CT Abdomen 7-              Endocrine    Controlled type 2 diabetes mellitus without complication, without long-term current use of insulin - Primary      Other Visit Diagnoses     Chronic midline low back pain        Relevant Medications    HYDROcodone-acetaminophen (NORCO) 7.5-325 mg per tablet    naproxen (NAPROSYN) 500 MG tablet         evaluated   Pt is currently stable on medication regimen.  Continue current therapy as scheduled.  Contact office with any questions about adjustments on medications.     Health Maintenance reviewed,.    Follow-up: No follow-ups on file.    ______________________________________________________________________    Chief Complaint  Chief Complaint   Patient presents with    Diabetes     Follow up       HPI  Eyad Rodriguez is a 67 y.o. male with multiple medical diagnoses as listed in the medical history and problem list that presents for diabetes.  Pt is known to me with last appointment 5/25/2022.    His medication has not been affordable.  [facility] He usually does well with his blood sugar control.    Diabetes: The patient reports that they  check blood sugars at home on a regular basis.  Blood sugar results are generally in an acceptable range (> 70 and <150). The patient  denies any problems with low blood sugars. The patient  reports that they have been complaint with current treatment plan (diet, exercise, medication).        PAST MEDICAL HISTORY:  Past Medical History:   Diagnosis Date    Angina pectoris     Anxiety     BPH (benign prostatic hypertrophy)     Chronic low back " pain     followed by pain management    Disorder of kidney and ureter     DJD (degenerative joint disease) of knee     Heart failure     Hemorrhoid     HTN (hypertension)     Insomnia     Obesity     Personal history of kidney stones     Tobacco abuse     Type 2 diabetes mellitus        PAST SURGICAL HISTORY:  Past Surgical History:   Procedure Laterality Date    cyst removed from back         SOCIAL HISTORY:  Social History     Socioeconomic History    Marital status:     Number of children: 1   Occupational History    Occupation:    Tobacco Use    Smoking status: Former Smoker     Types: Cigars    Smokeless tobacco: Current User    Tobacco comment: quit 10 yrs ago   Substance and Sexual Activity    Alcohol use: No     Alcohol/week: 1.7 standard drinks     Types: 2 Standard drinks or equivalent per week    Drug use: No    Sexual activity: Not Currently       FAMILY HISTORY:  Family History   Problem Relation Age of Onset    Hypertension Mother     Anxiety disorder Mother     Diverticulosis Mother     Irritable bowel syndrome Mother     Hypertension Father     Anxiety disorder Father     Anxiety disorder Sister     Colon polyps Brother     Colon cancer Neg Hx     Cirrhosis Neg Hx     Liver cancer Neg Hx     Celiac disease Neg Hx     Crohn's disease Neg Hx     Ulcerative colitis Neg Hx     Esophageal cancer Neg Hx     Stomach cancer Neg Hx     Rectal cancer Neg Hx        ALLERGIES AND MEDICATIONS: updated and reviewed.  Review of patient's allergies indicates:   Allergen Reactions    Codeine      Other reaction(s): Nausea  Other reaction(s): Nausea    Cyclobenzaprine Hives and Other (See Comments)     Other reaction(s): Nausea     Current Outpatient Medications   Medication Sig Dispense Refill    amLODIPine (NORVASC) 10 MG tablet TAKE 1 TABLET BY MOUTH ONCE A DAY 90 tablet 3    dulaglutide (TRULICITY) 0.75 mg/0.5 mL pen injector Inject 0.75 mg into the skin  "every 7 days. 12 pen 1    flash glucose scanning reader (FREESTYLE BRIAN 10 DAY READER) Misc 1 Units by Misc.(Non-Drug; Combo Route) route 3 (three) times daily with meals. 1 each 0    flash glucose sensor (FREESTYLE BRIAN 10 DAY SENSOR) Kit 1 Units by Misc.(Non-Drug; Combo Route) route 3 (three) times daily with meals. 1 kit 11    lancets [facility] [place] BG 3 times daily, to use with insurance preferred meter 300 each 3    lisinopriL (PRINIVIL,ZESTRIL) 40 MG tablet TAKE 1 TABLET BY MOUTH ONCE A DAY 90 tablet 1    metFORMIN (GLUCOPHAGE) 1000 MG tablet TAKE 1/2 (one-half) TABLET BY MOUTH TWICE A DAY with meals 180 tablet 0    metoprolol succinate (TOPROL-XL) 25 MG 24 hr tablet TAKE 1 TABLET BY MOUTH ONCE A DAY 90 tablet 3    pen needle, diabetic 32 gauge x 5/32" Ndle 1 Units by Misc.(Non-Drug; Combo Route) route every evening. 200 each 3    TRUE METRIX AIR GLUCOSE METER Misc use to test sugars THREE TIMES A DAY      TRUE METRIX GLUCOSE TEST STRIP Strp use to test blood sugars THREE TIMES A  strip 0    TRUEPLUS LANCETS 30 gauge Misc use to test sugars THREE TIMES A  each 2    zolpidem (AMBIEN) 10 mg Tab Take 1 tablet (10 mg total) by mouth nightly. 30 tablet 2    ALPRAZolam (XANAX) 0.5 MG tablet Take 1 tablet (0.5 mg total) by mouth 3 (three) times daily as needed for Anxiety. 90 tablet 2    alprostadil (MUSE) 250 MCG pellet 1 each (250 mcg total) by Transurethral route as needed for Erectile Dysfunction. use no more than 3 times per week (Patient not taking: No sig reported) 6 each 1    HYDROcodone-acetaminophen (NORCO) 7.5-325 mg per tablet Take 1 tablet by mouth every 6 (six) hours as needed. 30 tablet 0    naproxen (NAPROSYN) 500 MG tablet Take 1 tablet (500 mg total) by mouth 2 (two) times daily with meals. 60 tablet 0    rosuvastatin (CRESTOR) 5 MG tablet Take 1 tablet (5 mg total) by mouth once daily. (Patient not taking: Reported on 11/29/2021) 90 tablet 3     No current " "facility-administered medications for this visit.         ROS  Review of Systems   Constitutional: Negative for activity change, appetite change, fatigue, fever and unexpected weight change.   HENT: Negative for congestion and facial swelling.    Eyes: Negative for visual disturbance.   Respiratory: Negative for chest tightness, shortness of breath, wheezing and stridor.    Cardiovascular: Negative for chest pain, palpitations and leg swelling.   Gastrointestinal: Negative for abdominal distention, abdominal pain, constipation, diarrhea, nausea and vomiting.   Endocrine: Negative for cold intolerance, heat intolerance, polydipsia and polyuria.   Musculoskeletal: Positive for arthralgias and back pain.   Skin: Negative.    Allergic/Immunologic: Negative.    Neurological: Negative for dizziness, light-headedness, numbness and headaches.   Psychiatric/Behavioral: Negative for agitation and decreased concentration.         Physical Exam  Vitals:    08/26/22 0846   BP: 132/62   Pulse: 68   Temp: 98.7 °F (37.1 °C)   TempSrc: Oral   SpO2: 96%   Weight: 111.3 kg (245 lb 6 oz)   Height: 5' 9" (1.753 m)    Body mass index is 36.24 kg/m².  Weight: 111.3 kg (245 lb 6 oz)   Height: 5' 9" (175.3 cm)   Physical Exam  Vitals reviewed.   Constitutional:       Appearance: He is well-developed.   HENT:      Head: Normocephalic and atraumatic.      Right Ear: External ear normal.      Left Ear: External ear normal.      Nose: Nose normal.   Eyes:      Conjunctiva/sclera: Conjunctivae normal.      Pupils: Pupils are equal, round, and reactive to light.   Cardiovascular:      Rate and Rhythm: Normal rate and regular rhythm.      Heart sounds: Normal heart sounds.   Pulmonary:      Effort: Pulmonary effort is normal.      Breath sounds: Normal breath sounds.   Musculoskeletal:      Cervical back: Normal range of motion.   Skin:     General: Skin is warm and dry.   Neurological:      Mental Status: He is alert and oriented to person, " place, and time.   Psychiatric:         Behavior: Behavior normal.           Health Maintenance       Date Due Completion Date    HIV Screening Never done ---    High Dose Statin Never done ---    Colorectal Cancer Screening Never done ---    Shingles Vaccine (1 of 2) Never done ---    Lipid Panel 06/27/2020 6/27/2019    Eye Exam 09/27/2020 9/27/2019    Foot Exam 05/21/2021 5/21/2020    COVID-19 Vaccine (4 - Booster for Moderna series) 03/29/2022 11/29/2021    Pneumococcal Vaccines (Age 65+) (3 - PPSV23 or PCV20) 05/18/2022 10/8/2015    Abdominal Aortic Aneurysm Screening 05/18/2022 7/15/2015    Influenza Vaccine (1) 09/01/2022 10/8/2021    Hemoglobin A1c 02/22/2023 8/22/2022    Diabetes Urine Screening 02/28/2023 2/28/2022    TETANUS VACCINE 09/25/2029 9/25/2019              Patient note was created using [employer].  Any errors in syntax or even information may not have been identified and edited on initial review prior to signing this note.

## 2022-08-31 ENCOUNTER — PES CALL (OUTPATIENT)
Dept: ADMINISTRATIVE | Facility: CLINIC | Age: 67
End: 2022-08-31
Payer: MEDICARE

## 2022-11-03 ENCOUNTER — PATIENT OUTREACH (OUTPATIENT)
Dept: ADMINISTRATIVE | Facility: HOSPITAL | Age: 67
End: 2022-11-03
Payer: MEDICARE

## 2022-11-03 NOTE — PROGRESS NOTES
[address] Oct 2022 Diabetic Eye Exam - no  or  eye exam. Left message for patient to call our office. Please schedule.    [facility] Oct 2022 Statin Therapy  w/ Diabetes - history of statin use from 09/10/20-. Prescription , message sent to Dr. Garcia.    [address] Oct 2022 Medication Compliance - active  diabetes prescription, history of statin use from 09/10/20-. Prescription , message sent to Dr. Garcia.

## 2022-11-23 ENCOUNTER — TELEPHONE (OUTPATIENT)
Dept: FAMILY MEDICINE | Facility: CLINIC | Age: 67
End: 2022-11-23

## 2022-11-23 ENCOUNTER — OFFICE VISIT (OUTPATIENT)
Dept: FAMILY MEDICINE | Facility: CLINIC | Age: 67
End: 2022-11-23
Payer: MEDICARE

## 2022-11-23 VITALS
OXYGEN SATURATION: 95 % | HEIGHT: 69 IN | TEMPERATURE: 98 F | HEART RATE: 63 BPM | BODY MASS INDEX: 36.51 KG/M2 | DIASTOLIC BLOOD PRESSURE: 62 MMHG | SYSTOLIC BLOOD PRESSURE: 118 MMHG | WEIGHT: 246.5 LBS

## 2022-11-23 DIAGNOSIS — I70.0 ATHEROSCLEROSIS OF AORTA: ICD-10-CM

## 2022-11-23 DIAGNOSIS — M17.12 PRIMARY OSTEOARTHRITIS OF LEFT KNEE: ICD-10-CM

## 2022-11-23 DIAGNOSIS — M54.50 CHRONIC MIDLINE LOW BACK PAIN: ICD-10-CM

## 2022-11-23 DIAGNOSIS — F41.9 ANXIETY: ICD-10-CM

## 2022-11-23 DIAGNOSIS — E11.9 CONTROLLED TYPE 2 DIABETES MELLITUS WITHOUT COMPLICATION, WITHOUT LONG-TERM CURRENT USE OF INSULIN: Primary | ICD-10-CM

## 2022-11-23 DIAGNOSIS — Z12.11 COLON CANCER SCREENING: ICD-10-CM

## 2022-11-23 DIAGNOSIS — G89.29 CHRONIC MIDLINE LOW BACK PAIN: ICD-10-CM

## 2022-11-23 PROCEDURE — 99215 PR OFFICE/OUTPT VISIT, EST, LEVL V, 40-54 MIN: ICD-10-PCS | Mod: S$GLB,,, | Performed by: FAMILY MEDICINE

## 2022-11-23 PROCEDURE — 3066F PR DOCUMENTATION OF TREATMENT FOR NEPHROPATHY: ICD-10-PCS | Mod: CPTII,S$GLB,, | Performed by: FAMILY MEDICINE

## 2022-11-23 PROCEDURE — 1101F PT FALLS ASSESS-DOCD LE1/YR: CPT | Mod: CPTII,S$GLB,, | Performed by: FAMILY MEDICINE

## 2022-11-23 PROCEDURE — 99999 PR PBB SHADOW E&M-EST. PATIENT-LVL V: CPT | Mod: PBBFAC,,, | Performed by: FAMILY MEDICINE

## 2022-11-23 PROCEDURE — 4010F PR ACE/ARB THEARPY RXD/TAKEN: ICD-10-PCS | Mod: CPTII,S$GLB,, | Performed by: FAMILY MEDICINE

## 2022-11-23 PROCEDURE — 3008F BODY MASS INDEX DOCD: CPT | Mod: CPTII,S$GLB,, | Performed by: FAMILY MEDICINE

## 2022-11-23 PROCEDURE — 3066F NEPHROPATHY DOC TX: CPT | Mod: CPTII,S$GLB,, | Performed by: FAMILY MEDICINE

## 2022-11-23 PROCEDURE — 3074F SYST BP LT 130 MM HG: CPT | Mod: CPTII,S$GLB,, | Performed by: FAMILY MEDICINE

## 2022-11-23 PROCEDURE — 1101F PR PT FALLS ASSESS DOC 0-1 FALLS W/OUT INJ PAST YR: ICD-10-PCS | Mod: CPTII,S$GLB,, | Performed by: FAMILY MEDICINE

## 2022-11-23 PROCEDURE — 3078F DIAST BP <80 MM HG: CPT | Mod: CPTII,S$GLB,, | Performed by: FAMILY MEDICINE

## 2022-11-23 PROCEDURE — 3060F POS MICROALBUMINURIA REV: CPT | Mod: CPTII,S$GLB,, | Performed by: FAMILY MEDICINE

## 2022-11-23 PROCEDURE — 3044F HG A1C LEVEL LT 7.0%: CPT | Mod: CPTII,S$GLB,, | Performed by: FAMILY MEDICINE

## 2022-11-23 PROCEDURE — 3044F PR MOST RECENT HEMOGLOBIN A1C LEVEL <7.0%: ICD-10-PCS | Mod: CPTII,S$GLB,, | Performed by: FAMILY MEDICINE

## 2022-11-23 PROCEDURE — 99215 OFFICE O/P EST HI 40 MIN: CPT | Mod: S$GLB,,, | Performed by: FAMILY MEDICINE

## 2022-11-23 PROCEDURE — 3008F PR BODY MASS INDEX (BMI) DOCUMENTED: ICD-10-PCS | Mod: CPTII,S$GLB,, | Performed by: FAMILY MEDICINE

## 2022-11-23 PROCEDURE — 1159F MED LIST DOCD IN RCRD: CPT | Mod: CPTII,S$GLB,, | Performed by: FAMILY MEDICINE

## 2022-11-23 PROCEDURE — 4010F ACE/ARB THERAPY RXD/TAKEN: CPT | Mod: CPTII,S$GLB,, | Performed by: FAMILY MEDICINE

## 2022-11-23 PROCEDURE — 1159F PR MEDICATION LIST DOCUMENTED IN MEDICAL RECORD: ICD-10-PCS | Mod: CPTII,S$GLB,, | Performed by: FAMILY MEDICINE

## 2022-11-23 PROCEDURE — 1160F PR REVIEW ALL MEDS BY PRESCRIBER/CLIN PHARMACIST DOCUMENTED: ICD-10-PCS | Mod: CPTII,S$GLB,, | Performed by: FAMILY MEDICINE

## 2022-11-23 PROCEDURE — 3288F PR FALLS RISK ASSESSMENT DOCUMENTED: ICD-10-PCS | Mod: CPTII,S$GLB,, | Performed by: FAMILY MEDICINE

## 2022-11-23 PROCEDURE — 3288F FALL RISK ASSESSMENT DOCD: CPT | Mod: CPTII,S$GLB,, | Performed by: FAMILY MEDICINE

## 2022-11-23 PROCEDURE — 3074F PR MOST RECENT SYSTOLIC BLOOD PRESSURE < 130 MM HG: ICD-10-PCS | Mod: CPTII,S$GLB,, | Performed by: FAMILY MEDICINE

## 2022-11-23 PROCEDURE — 99999 PR PBB SHADOW E&M-EST. PATIENT-LVL V: ICD-10-PCS | Mod: PBBFAC,,, | Performed by: FAMILY MEDICINE

## 2022-11-23 PROCEDURE — 3060F PR POS MICROALBUMINURIA RESULT DOCUMENTED/REVIEW: ICD-10-PCS | Mod: CPTII,S$GLB,, | Performed by: FAMILY MEDICINE

## 2022-11-23 PROCEDURE — 1160F RVW MEDS BY RX/DR IN RCRD: CPT | Mod: CPTII,S$GLB,, | Performed by: FAMILY MEDICINE

## 2022-11-23 PROCEDURE — 3078F PR MOST RECENT DIASTOLIC BLOOD PRESSURE < 80 MM HG: ICD-10-PCS | Mod: CPTII,S$GLB,, | Performed by: FAMILY MEDICINE

## 2022-11-23 RX ORDER — ALPRAZOLAM 0.5 MG/1
0.5 TABLET ORAL 3 TIMES DAILY PRN
Qty: 90 TABLET | Refills: 2 | Status: SHIPPED | OUTPATIENT
Start: 2022-11-23 | End: 2023-02-13 | Stop reason: SDUPTHER

## 2022-11-23 RX ORDER — HYDROCODONE BITARTRATE AND ACETAMINOPHEN 7.5; 325 MG/1; MG/1
1 TABLET ORAL EVERY 6 HOURS PRN
Qty: 30 TABLET | Refills: 0 | Status: SHIPPED | OUTPATIENT
Start: 2022-11-23 | End: 2023-02-13 | Stop reason: SDUPTHER

## 2022-11-23 NOTE — PROGRESS NOTES
"  Assessment & Plan  Problem List Items Addressed This Visit          Psychiatric    Anxiety    Relevant Medications    ALPRAZolam (XANAX) 0.5 MG tablet       Cardiac/Vascular    Atherosclerosis of aorta    Overview     "The aorta is of normal caliber and tapers appropriately, demonstrating mild calcified atheromatous disease" CT Abdomen 7-            Endocrine    Controlled type 2 diabetes mellitus without complication, without long-term current use of insulin - Primary    Relevant Orders    Ambulatory referral/consult to Optometry    Hemoglobin A1C       Orthopedic    DJD (degenerative joint disease) of knee    Relevant Orders    Ambulatory referral/consult to Orthopedics     Other Visit Diagnoses       Chronic midline low back pain        Relevant Medications    HYDROcodone-acetaminophen (NORCO) 7.5-325 mg per tablet    Colon cancer screening        Relevant Orders    Ambulatory referral/consult to Endo Procedure               Health Maintenance reviewed,.    Follow-up: No follow-ups on file.    ______________________________________________________________________    Chief Complaint  No chief complaint on file.      HPI  Eyad Rodriguez is a 67 y.o. male with multiple medical diagnoses as listed in the medical history and problem list that presents for diabetes and medication refills.  Pt is known to me with last appointment 8/26/2022.    He is concerned about his vision.  He does have a history of diabetic retinopathy.  He would like to get a new diabetic eye exam.  He does require refills on his medication.  Patient reports he would like to address his knee.  Patient has had chronic left knee pain.  Indicates that he now has right knee pain.  Concerned that he may require surgery.  Patient reports that the increased pain makes it difficult for him to ambulate back and forth at the [facility].  He is responsible as the  to address all events that occur at this facility.  He would like " to keep up with his level of activity.    PAST MEDICAL HISTORY:  Past Medical History:   Diagnosis Date    Angina pectoris     Anxiety     BPH (benign prostatic hypertrophy)     Chronic low back pain     followed by pain management    Disorder of kidney and ureter     DJD (degenerative joint disease) of knee     Heart failure     Hemorrhoid     HTN (hypertension)     Insomnia     Obesity     Personal history of kidney stones     Tobacco abuse     Type 2 diabetes mellitus        PAST SURGICAL HISTORY:  Past Surgical History:   Procedure Laterality Date    cyst removed from back         SOCIAL HISTORY:  Social History     Socioeconomic History    Marital status:     Number of children: 1   Occupational History    Occupation:    Tobacco Use    Smoking status: Former     Types: Cigars    Smokeless tobacco: Current    Tobacco comments:     quit 10 yrs ago   Substance and Sexual Activity    Alcohol use: No     Alcohol/week: 1.7 standard drinks     Types: 2 Standard drinks or equivalent per week    Drug use: No    Sexual activity: Not Currently       FAMILY HISTORY:  Family History   Problem Relation Age of Onset    Hypertension Mother     Anxiety disorder Mother     Diverticulosis Mother     Irritable bowel syndrome Mother     Hypertension Father     Anxiety disorder Father     Anxiety disorder Sister     Colon polyps Brother     Colon cancer Neg Hx     Cirrhosis Neg Hx     Liver cancer Neg Hx     Celiac disease Neg Hx     Crohn's disease Neg Hx     Ulcerative colitis Neg Hx     Esophageal cancer Neg Hx     Stomach cancer Neg Hx     Rectal cancer Neg Hx        ALLERGIES AND MEDICATIONS: updated and reviewed.  Review of patient's allergies indicates:   Allergen Reactions    Cyclobenzaprine Hives and Other (See Comments)     Other reaction(s): Nausea     Current Outpatient Medications   Medication Sig Dispense Refill    amLODIPine (NORVASC) 10 MG tablet TAKE 1 TABLET BY MOUTH ONCE A DAY 90 tablet 3     "flash glucose scanning reader (FREESTYLE BRIAN 10 DAY READER) Misc 1 Units by Misc.(Non-Drug; Combo Route) route 3 (three) times daily with meals. 1 each 0    flash glucose sensor (FREESTYLE BRIAN 10 DAY SENSOR) Kit 1 Units by Misc.(Non-Drug; Combo Route) route 3 (three) times daily with meals. 1 kit 11    lancets [facility] To check BG 3 times daily, to use with insurance preferred meter 300 each 3    lisinopriL (PRINIVIL,ZESTRIL) 40 MG tablet TAKE 1 TABLET BY MOUTH ONCE A DAY 90 tablet 1    metFORMIN (GLUCOPHAGE) 1000 MG tablet TAKE 1/2 (one-half) TABLET BY MOUTH TWICE A DAY with meals 180 tablet 0    metoprolol succinate (TOPROL-XL) 25 MG 24 hr tablet TAKE 1 TABLET BY MOUTH ONCE A DAY 90 tablet 3    naproxen (NAPROSYN) 500 MG tablet Take 1 tablet (500 mg total) by mouth 2 (two) times daily with meals. 60 tablet 0    pen needle, diabetic 32 gauge x 5/32" Ndle 1 Units by Misc.(Non-Drug; Combo Route) route every evening. 200 each 3    TRUE METRIX AIR GLUCOSE METER Misc use to test sugars THREE TIMES A DAY      TRUE METRIX GLUCOSE TEST STRIP Strp use to test blood sugars THREE TIMES A  strip 0    TRUEPLUS LANCETS 30 gauge Misc use to test sugars THREE TIMES A  each 2    zolpidem (AMBIEN) 10 mg Tab TAKE 1 TABLET BY MOUTH EVERY NIGHT AT BEDTIME 30 tablet 2    ALPRAZolam (XANAX) 0.5 MG tablet Take 1 tablet (0.5 mg total) by mouth 3 (three) times daily as needed for Anxiety. 90 tablet 2    alprostadil (MUSE) 250 MCG pellet 1 each (250 mcg total) by Transurethral route as needed for Erectile Dysfunction. use no more than 3 times per week (Patient not taking: Reported on 11/29/2021) 6 each 1    dulaglutide (TRULICITY) 0.75 mg/0.5 mL pen injector Inject 0.75 mg into the skin every 7 days. (Patient not taking: Reported on 11/23/2022) 12 pen 1    HYDROcodone-acetaminophen (NORCO) 7.5-325 mg per tablet Take 1 tablet by mouth every 6 (six) hours as needed. 30 tablet 0    rosuvastatin (CRESTOR) 5 MG tablet Take 1 tablet " "(5 mg total) by mouth once daily. (Patient not taking: Reported on 11/29/2021) 90 tablet 3     No current facility-administered medications for this visit.         ROS  Review of Systems   Constitutional:  Negative for activity change, appetite change, fatigue, fever and unexpected weight change.   HENT:  Negative for congestion and facial swelling.    Eyes:  Negative for visual disturbance.   Respiratory:  Negative for chest tightness, shortness of breath, wheezing and stridor.    Cardiovascular:  Negative for chest pain, palpitations and leg swelling.   Gastrointestinal:  Negative for abdominal distention, abdominal pain, constipation, diarrhea, nausea and vomiting.   Endocrine: Negative for cold intolerance, heat intolerance, polydipsia and polyuria.   Skin: Negative.    Allergic/Immunologic: Negative.    Neurological:  Negative for dizziness, light-headedness, numbness and headaches.   Psychiatric/Behavioral:  Negative for agitation and decreased concentration.          Physical Exam  Vitals:    11/23/22 0827   BP: 118/62   Pulse: 63   Temp: 97.9 °F (36.6 °C)   TempSrc: Oral   SpO2: 95%   Weight: 111.8 kg (246 lb 7.6 oz)   Height: 5' 9" (1.753 m)    Body mass index is 36.4 kg/m².  Weight: 111.8 kg (246 lb 7.6 oz)   Height: 5' 9" (175.3 cm)   Physical Exam  Vitals reviewed.   Constitutional:       Appearance: Normal appearance. He is well-developed.   HENT:      Head: Normocephalic and atraumatic.      Right Ear: External ear normal.      Left Ear: External ear normal.      Nose: Nose normal.   Eyes:      Extraocular Movements: Extraocular movements intact.      Conjunctiva/sclera: Conjunctivae normal.      Pupils: Pupils are equal, round, and reactive to light.   Cardiovascular:      Rate and Rhythm: Normal rate and regular rhythm.      Heart sounds: Normal heart sounds.   Pulmonary:      Effort: Pulmonary effort is normal.      Breath sounds: Normal breath sounds.   Musculoskeletal:      Cervical back: Normal " range of motion.   Skin:     General: Skin is warm and dry.   Neurological:      Mental Status: He is alert and oriented to person, place, and time.   Psychiatric:         Behavior: Behavior normal.         Health Maintenance         Date Due Completion Date    HIV Screening Never done ---    High Dose Statin Never done ---    Colorectal Cancer Screening Never done ---    Shingles Vaccine (1 of 2) Never done ---    Lipid Panel 06/27/2020 6/27/2019    Eye Exam 09/27/2020 9/27/2019    Foot Exam 05/21/2021 5/21/2020    COVID-19 Vaccine (4 - Booster for Moderna series) 01/24/2022 11/29/2021    Pneumococcal Vaccines (Age 65+) (3 - PPSV23 if available, else PCV20) 05/18/2022 10/8/2015    Abdominal Aortic Aneurysm Screening 05/18/2022 7/15/2015    Hemoglobin A1c 02/22/2023 8/22/2022    Diabetes Urine Screening 02/28/2023 2/28/2022    TETANUS VACCINE 09/25/2029 9/25/2019                Patient note was created using [employer].  Any errors in syntax or even information may not have been identified and edited on initial review prior to signing this note.

## 2022-12-07 DIAGNOSIS — E11.9 CONTROLLED TYPE 2 DIABETES MELLITUS WITHOUT COMPLICATION, WITHOUT LONG-TERM CURRENT USE OF INSULIN: ICD-10-CM

## 2023-01-10 DIAGNOSIS — M17.12 PRIMARY OSTEOARTHRITIS OF LEFT KNEE: Primary | ICD-10-CM

## 2023-01-13 ENCOUNTER — OFFICE VISIT (OUTPATIENT)
Dept: ORTHOPEDICS | Facility: CLINIC | Age: 68
End: 2023-01-13
Payer: MEDICARE

## 2023-01-13 VITALS — WEIGHT: 244.13 LBS | HEIGHT: 69 IN | BODY MASS INDEX: 36.16 KG/M2

## 2023-01-13 DIAGNOSIS — M17.12 PRIMARY OSTEOARTHRITIS OF LEFT KNEE: ICD-10-CM

## 2023-01-13 PROCEDURE — 99999 PR PBB SHADOW E&M-EST. PATIENT-LVL III: ICD-10-PCS | Mod: PBBFAC,,, | Performed by: ORTHOPAEDIC SURGERY

## 2023-01-13 PROCEDURE — 99203 OFFICE O/P NEW LOW 30 MIN: CPT | Mod: S$GLB,,, | Performed by: ORTHOPAEDIC SURGERY

## 2023-01-13 PROCEDURE — 1125F AMNT PAIN NOTED PAIN PRSNT: CPT | Mod: CPTII,S$GLB,, | Performed by: ORTHOPAEDIC SURGERY

## 2023-01-13 PROCEDURE — 1125F PR PAIN SEVERITY QUANTIFIED, PAIN PRESENT: ICD-10-PCS | Mod: CPTII,S$GLB,, | Performed by: ORTHOPAEDIC SURGERY

## 2023-01-13 PROCEDURE — 1159F MED LIST DOCD IN RCRD: CPT | Mod: CPTII,S$GLB,, | Performed by: ORTHOPAEDIC SURGERY

## 2023-01-13 PROCEDURE — 3008F BODY MASS INDEX DOCD: CPT | Mod: CPTII,S$GLB,, | Performed by: ORTHOPAEDIC SURGERY

## 2023-01-13 PROCEDURE — 3008F PR BODY MASS INDEX (BMI) DOCUMENTED: ICD-10-PCS | Mod: CPTII,S$GLB,, | Performed by: ORTHOPAEDIC SURGERY

## 2023-01-13 PROCEDURE — 99999 PR PBB SHADOW E&M-EST. PATIENT-LVL III: CPT | Mod: PBBFAC,,, | Performed by: ORTHOPAEDIC SURGERY

## 2023-01-13 PROCEDURE — 99203 PR OFFICE/OUTPT VISIT, NEW, LEVL III, 30-44 MIN: ICD-10-PCS | Mod: S$GLB,,, | Performed by: ORTHOPAEDIC SURGERY

## 2023-01-13 PROCEDURE — 1159F PR MEDICATION LIST DOCUMENTED IN MEDICAL RECORD: ICD-10-PCS | Mod: CPTII,S$GLB,, | Performed by: ORTHOPAEDIC SURGERY

## 2023-01-13 NOTE — PROGRESS NOTES
NEW PATIENT ORTHOPAEDIC: Knee    PRIMARY CARE PHYSICIAN: Nafisa Garcia MD   REFERRING PROVIDER: Nafisa Garcia MD  [id number] [facility]  [place] [id number]     ASSESSMENT & PLAN:    Impression:  Left Knee Severe Degenerative Osteoarthritis, Primary     Follow Up Plan: March for pre-surgical planning     Surgery:    Eyad Rodriguez has radiograph and physical exam evidence of the aforementioned impression and wishes to pursue surgery. This patient appears to have sufficient symptoms to warrant surgical intervention and is an appropriate candidate for left Primary Total Knee Arthroplasty as evidenced by months of unsuccessful non-operative treatment as outlined in the HPI below and progressive symptoms.    We had a lengthy discussion regarding the risk and benefit of surgery, the alternatives, limitations and personnel involved. These included but were not limited to infection, persistent pain, instability, nerve injury, blood clots, loosening and medical complications. We also discussed the pre-operative course, surgery itself and rehabilitation.    We will plan for use of [employer] press-fit CR components, with [employer] assistance.     Tentative Surgery Date: 4/5/23    The patient has been ordered:  None    CONSULTS:   None    ACTIVE PROBLEM LIST  Patient Active Problem List   Diagnosis    BPH (benign prostatic hypertrophy)    DJD (degenerative joint disease) of knee    Insomnia    Anxiety    Chronic low back pain    Tobacco abuse    Essential hypertension    Angina at rest    Chest pain, atypical    CHF (congestive heart failure)    Cardiomyopathy    Controlled type 2 diabetes mellitus without complication, without long-term current use of insulin    Atherosclerosis of aorta           SUBJECTIVE    CHIEF COMPLAINT: Knee Pain    HPI:   Eyad Rodriguez is a 67 y.o. male here for evaluation and management of left knee pain. There is not a specific incident that brought about this pain. he has had progressive  problems with the knee(s) starting 10 years ago but is now progressing to interfere with activities which include: walking, functional household ADL's, enjoying hobbies, exercise, rising from a sitting position, standing for prolonged periods of time, getting in and out of a car, and climbing stairs     Currently the pain in the joint is rated at moderate with activity. The pain is constant and is located in the knee, at level of joint line, located medially, located anterior, and located posterior. The pain is described as aching, severe, sharp, stiffness, and throbbing. Relieving factors include rest, ice or heat, prescription medication, and repositioning.     There is associated Popping.     Eyad Rodriguez has no additional complaints.     PROGRESSIVE SYMPTOMS:  Pain impacting sleep  Pain worsened by weight bearing  Pain effecting living situation    FUNCTIONAL STATUS:   Climb a flight of stairs or walk up a hill     PREVIOUS TREATMENTS:  Medical: Steroid Injections, Biologic Injections, and Narcotics  Physical Therapy: Activities Modified   Previous Orthopaedic Surgery: None    REVIEW OF SYSTEMS:  PAIN ASSESSMENT:  See HPI.  MUSCULOSKELETAL: See HPI.  OTHER 10 point review of systems is negative except as stated in HPI above    PAST MEDICAL HISTORY   has a past medical history of Angina pectoris, Anxiety, BPH (benign prostatic hypertrophy), Chronic low back pain, Disorder of kidney and ureter, DJD (degenerative joint disease) of knee, Heart failure, Hemorrhoid, HTN (hypertension), Insomnia, Obesity, Personal history of kidney stones, Tobacco abuse, and Type 2 diabetes mellitus.     PAST SURGICAL HISTORY   has a past surgical history that includes cyst removed from back.     FAMILY HISTORY  family history includes Anxiety disorder in his father, mother, and sister; Colon polyps in his brother; Diverticulosis in his mother; Hypertension in his father and mother; Irritable bowel syndrome in his mother.  "    SOCIAL HISTORY   reports that he has quit smoking. His smoking use included cigars. He uses smokeless tobacco. He reports that he does not drink alcohol and does not use drugs.     ALLERGIES   Review of patient's allergies indicates:   Allergen Reactions    Cyclobenzaprine Hives and Other (See Comments)     Other reaction(s): Nausea        MEDICATIONS  Current Outpatient Medications on File Prior to Visit   Medication Sig Dispense Refill    ALPRAZolam (XANAX) 0.5 MG tablet Take 1 tablet (0.5 mg total) by mouth 3 (three) times daily as needed for Anxiety. 90 tablet 2    amLODIPine (NORVASC) 10 MG tablet TAKE 1 TABLET BY MOUTH ONCE A DAY 90 tablet 3    flash glucose scanning reader (FREESTYLE BRIAN 10 DAY READER) Misc 1 Units by Misc.(Non-Drug; Combo Route) route 3 (three) times daily with meals. 1 each 0    flash glucose sensor (FREESTYLE BRIAN 10 DAY SENSOR) Kit 1 Units by Misc.(Non-Drug; Combo Route) route 3 (three) times daily with meals. 1 kit 11    HYDROcodone-acetaminophen (NORCO) 7.5-325 mg per tablet Take 1 tablet by mouth every 6 (six) hours as needed. 30 tablet 0    lancets Misc To check BG 3 times daily, to use with insurance preferred meter 300 each 3    lisinopriL (PRINIVIL,ZESTRIL) 40 MG tablet TAKE 1 TABLET BY MOUTH ONCE A DAY 90 tablet 1    metFORMIN (GLUCOPHAGE) 1000 MG tablet TAKE 1/2 (one-half) TABLET BY MOUTH TWICE A DAY with meals 180 tablet 0    metoprolol succinate (TOPROL-XL) 25 MG 24 hr tablet TAKE 1 TABLET BY MOUTH ONCE A DAY 90 tablet 3    naproxen (NAPROSYN) 500 MG tablet Take 1 tablet (500 mg total) by mouth 2 (two) times daily with meals. 60 tablet 0    pen needle, diabetic 32 gauge x 5/32" Ndle 1 Units by Misc.(Non-Drug; Combo Route) route every evening. 200 each 3    TRUE METRIX AIR GLUCOSE METER Misc use to test sugars THREE TIMES A DAY      TRUE METRIX GLUCOSE TEST STRIP Strp use to test blood sugars THREE TIMES A  strip 0    TRUEPLUS LANCETS 30 gauge Misc use to test " "sugars THREE TIMES A  each 2    zolpidem (AMBIEN) 10 mg Tab TAKE 1 TABLET BY MOUTH EVERY NIGHT AT BEDTIME 30 tablet 2    alprostadil (MUSE) 250 MCG pellet 1 each (250 mcg total) by Transurethral route as needed for Erectile Dysfunction. use no more than 3 times per week (Patient not taking: Reported on 11/29/2021) 6 each 1    dulaglutide (TRULICITY) 0.75 mg/0.5 mL pen injector Inject 0.75 mg into the skin every 7 days. (Patient not taking: Reported on 11/23/2022) 12 pen 1    rosuvastatin (CRESTOR) 5 MG tablet Take 1 tablet (5 mg total) by mouth once daily. (Patient not taking: Reported on 11/29/2021) 90 tablet 3     No current facility-administered medications on file prior to visit.          PHYSICAL EXAM   height is 5' 9" (1.753 m) and weight is 110.7 kg (244 lb 1.6 oz).   Body mass index is 36.05 kg/m².      All other systems deferred.  GENERAL:  No acute distress  HABITUS: Obese  GAIT: Antalgic  SKIN: Normal     KNEE EXAM:    left:   Effusion: Small joint effusion  TTP: yes over Medial Joint Line   Varus deformity   yes crepitus with passive knee ROM  Passive ROM: Extension 0, Flexion 110  No pain with manipulation of patella  Stable to varus/valgus stress. No increased laxity to anterior/posterior drawer testing  negative Pamela's test  No pain with IR/ER rotation of the hip  5/5 strength in knee flexion and extension, ankle plantarflexion and dorsiflexion  Neurovascular Status: Sensation intact to light touch in Sural, Saphenous, SPN, DPN, Tibial nerve distribution  2+ pulse DP/PT, normal capillary refill, foot has normal warmth    DATA:  Diagnostic tests reviewed for today's visit:     4v of the knee reveal Severe degenerative changes of the Medial and Patellofemoral compartment. There is evidence of advanced osteoarthritis changes with Subchondral sclerosis, Osteophyte formation, and Joint space narrowing. The limb is in varus alignment. The patella is tracking midline.    "

## 2023-01-17 ENCOUNTER — PATIENT OUTREACH (OUTPATIENT)
Dept: ADMINISTRATIVE | Facility: HOSPITAL | Age: 68
End: 2023-01-17
Payer: MEDICARE

## 2023-02-13 ENCOUNTER — TELEPHONE (OUTPATIENT)
Dept: FAMILY MEDICINE | Facility: CLINIC | Age: 68
End: 2023-02-13

## 2023-02-13 DIAGNOSIS — I10 ESSENTIAL HYPERTENSION: ICD-10-CM

## 2023-02-13 DIAGNOSIS — F51.02 TRANSIENT INSOMNIA: ICD-10-CM

## 2023-02-13 DIAGNOSIS — F41.9 ANXIETY: ICD-10-CM

## 2023-02-13 DIAGNOSIS — G89.29 CHRONIC MIDLINE LOW BACK PAIN: ICD-10-CM

## 2023-02-13 DIAGNOSIS — M54.50 CHRONIC MIDLINE LOW BACK PAIN: ICD-10-CM

## 2023-02-13 RX ORDER — AMLODIPINE BESYLATE 10 MG/1
10 TABLET ORAL DAILY
Qty: 90 TABLET | Refills: 3 | Status: SHIPPED | OUTPATIENT
Start: 2023-02-13 | End: 2023-12-15 | Stop reason: SDUPTHER

## 2023-02-13 RX ORDER — ALPRAZOLAM 0.5 MG/1
0.5 TABLET ORAL 3 TIMES DAILY PRN
Qty: 90 TABLET | Refills: 2 | Status: SHIPPED | OUTPATIENT
Start: 2023-02-13 | End: 2023-04-04 | Stop reason: HOSPADM

## 2023-02-13 RX ORDER — ZOLPIDEM TARTRATE 10 MG/1
10 TABLET ORAL NIGHTLY
Qty: 30 TABLET | Refills: 2 | Status: SHIPPED | OUTPATIENT
Start: 2023-02-13 | End: 2023-04-14 | Stop reason: SDUPTHER

## 2023-02-13 RX ORDER — METOPROLOL SUCCINATE 25 MG/1
25 TABLET, EXTENDED RELEASE ORAL DAILY
Qty: 90 TABLET | Refills: 3 | Status: SHIPPED | OUTPATIENT
Start: 2023-02-13 | End: 2023-12-15 | Stop reason: SDUPTHER

## 2023-02-13 RX ORDER — LISINOPRIL 40 MG/1
40 TABLET ORAL DAILY
Qty: 90 TABLET | Refills: 1 | Status: SHIPPED | OUTPATIENT
Start: 2023-02-13 | End: 2023-11-08

## 2023-02-13 RX ORDER — HYDROCODONE BITARTRATE AND ACETAMINOPHEN 7.5; 325 MG/1; MG/1
1 TABLET ORAL EVERY 6 HOURS PRN
Qty: 30 TABLET | Refills: 0 | Status: SHIPPED | OUTPATIENT
Start: 2023-02-13 | End: 2023-04-04 | Stop reason: HOSPADM

## 2023-02-13 RX ORDER — NAPROXEN 500 MG/1
500 TABLET ORAL 2 TIMES DAILY WITH MEALS
Qty: 60 TABLET | Refills: 0 | Status: SHIPPED | OUTPATIENT
Start: 2023-02-13 | End: 2023-04-04 | Stop reason: HOSPADM

## 2023-02-28 ENCOUNTER — TELEPHONE (OUTPATIENT)
Dept: FAMILY MEDICINE | Facility: CLINIC | Age: 68
End: 2023-02-28
Payer: MEDICARE

## 2023-02-28 ENCOUNTER — OFFICE VISIT (OUTPATIENT)
Dept: ORTHOPEDICS | Facility: CLINIC | Age: 68
End: 2023-02-28
Payer: MEDICARE

## 2023-02-28 VITALS — HEIGHT: 69 IN | WEIGHT: 236.63 LBS | BODY MASS INDEX: 35.05 KG/M2

## 2023-02-28 DIAGNOSIS — M17.12 PRIMARY OSTEOARTHRITIS OF LEFT KNEE: Primary | ICD-10-CM

## 2023-02-28 PROCEDURE — 99999 PR PBB SHADOW E&M-EST. PATIENT-LVL IV: CPT | Mod: PBBFAC,HCNC,, | Performed by: ORTHOPAEDIC SURGERY

## 2023-02-28 PROCEDURE — 1125F PR PAIN SEVERITY QUANTIFIED, PAIN PRESENT: ICD-10-PCS | Mod: HCNC,CPTII,S$GLB, | Performed by: ORTHOPAEDIC SURGERY

## 2023-02-28 PROCEDURE — 1159F MED LIST DOCD IN RCRD: CPT | Mod: HCNC,CPTII,S$GLB, | Performed by: ORTHOPAEDIC SURGERY

## 2023-02-28 PROCEDURE — 99214 OFFICE O/P EST MOD 30 MIN: CPT | Mod: HCNC,S$GLB,, | Performed by: ORTHOPAEDIC SURGERY

## 2023-02-28 PROCEDURE — 4010F ACE/ARB THERAPY RXD/TAKEN: CPT | Mod: HCNC,CPTII,S$GLB, | Performed by: ORTHOPAEDIC SURGERY

## 2023-02-28 PROCEDURE — 99214 PR OFFICE/OUTPT VISIT, EST, LEVL IV, 30-39 MIN: ICD-10-PCS | Mod: HCNC,S$GLB,, | Performed by: ORTHOPAEDIC SURGERY

## 2023-02-28 PROCEDURE — 3008F PR BODY MASS INDEX (BMI) DOCUMENTED: ICD-10-PCS | Mod: HCNC,CPTII,S$GLB, | Performed by: ORTHOPAEDIC SURGERY

## 2023-02-28 PROCEDURE — 3008F BODY MASS INDEX DOCD: CPT | Mod: HCNC,CPTII,S$GLB, | Performed by: ORTHOPAEDIC SURGERY

## 2023-02-28 PROCEDURE — 1125F AMNT PAIN NOTED PAIN PRSNT: CPT | Mod: HCNC,CPTII,S$GLB, | Performed by: ORTHOPAEDIC SURGERY

## 2023-02-28 PROCEDURE — 4010F PR ACE/ARB THEARPY RXD/TAKEN: ICD-10-PCS | Mod: HCNC,CPTII,S$GLB, | Performed by: ORTHOPAEDIC SURGERY

## 2023-02-28 PROCEDURE — 1159F PR MEDICATION LIST DOCUMENTED IN MEDICAL RECORD: ICD-10-PCS | Mod: HCNC,CPTII,S$GLB, | Performed by: ORTHOPAEDIC SURGERY

## 2023-02-28 PROCEDURE — 99999 PR PBB SHADOW E&M-EST. PATIENT-LVL IV: ICD-10-PCS | Mod: PBBFAC,HCNC,, | Performed by: ORTHOPAEDIC SURGERY

## 2023-02-28 NOTE — PROGRESS NOTES
EST PATIENT ORTHOPAEDIC: Knee    PRIMARY CARE PHYSICIAN: Nafisa Garcia MD   REFERRING PROVIDER: No referring provider defined for this encounter.     ASSESSMENT & PLAN:    Impression:  Left Knee Severe Degenerative Osteoarthritis, Primary     Follow Up Plan: 3 weeks after surgery    Surgery:    Eyad Rodriguez has radiograph and physical exam evidence of the aforementioned impression and wishes to pursue surgery. This patient appears to have sufficient symptoms to warrant surgical intervention and is an appropriate candidate for left Primary Total Knee Arthroplasty as evidenced by months of unsuccessful non-operative treatment as outlined in the HPI below and progressive symptoms.    We had a lengthy discussion regarding the risk and benefit of surgery, the alternatives, limitations and personnel involved. These included but were not limited to infection, persistent pain, instability, nerve injury, blood clots, loosening and medical complications. We also discussed the pre-operative course, surgery itself and rehabilitation.    We discussed his smoking history and increased risk of complications associated with this. We discussed his social situation and help he needs to obtain as well as plans to get to rehab appointments. He would like same day discharge    We will plan for use of [place] press-fit CR components, with [facility]GO assistance.     Tentative Surgery Date: 4/5/23    The patient has been ordered:  HUGO CT    CONSULTS:   PCP  Anesthesia    ACTIVE PROBLEM LIST  Patient Active Problem List   Diagnosis    BPH (benign prostatic hypertrophy)    DJD (degenerative joint disease) of knee    Insomnia    Anxiety    Chronic low back pain    Tobacco abuse    Essential hypertension    Angina at rest    Chest pain, atypical    CHF (congestive heart failure)    Cardiomyopathy    Controlled type 2 diabetes mellitus without complication, without long-term current use of insulin    Atherosclerosis of aorta            SUBJECTIVE    CHIEF COMPLAINT: Knee Pain    HPI:   Eyad Rodriguez is a 67 y.o. male here for evaluation and management of left knee pain. There is not a specific incident that brought about this pain. he has had progressive problems with the knee(s) starting 10 years ago but is now progressing to interfere with activities which include: walking, functional household ADL's, enjoying hobbies, exercise, rising from a sitting position, standing for prolonged periods of time, getting in and out of a car, and climbing stairs     Currently the pain in the joint is rated at moderate with activity. The pain is constant and is located in the knee, at level of joint line, located medially, located anterior, and located posterior. The pain is described as aching, severe, sharp, stiffness, and throbbing. Relieving factors include rest, ice or heat, prescription medication, and repositioning.     There is associated Popping.     Eyad Rodriguez has no additional complaints.     2/28/23: Here for pre-surgical planning. Last A1C in August was 5.9. Loss 8 pounds since last visit.     PROGRESSIVE SYMPTOMS:  Pain impacting sleep  Pain worsened by weight bearing  Pain effecting living situation    FUNCTIONAL STATUS:   Climb a flight of stairs or walk up a hill     PREVIOUS TREATMENTS:  Medical: Steroid Injections, Biologic Injections, and Narcotics  Physical Therapy: Activities Modified   Previous Orthopaedic Surgery: None    REVIEW OF SYSTEMS:  PAIN ASSESSMENT:  See HPI.  MUSCULOSKELETAL: See HPI.  OTHER 10 point review of systems is negative except as stated in HPI above    PAST MEDICAL HISTORY   has a past medical history of Angina pectoris, Anxiety, BPH (benign prostatic hypertrophy), Chronic low back pain, Disorder of kidney and ureter, DJD (degenerative joint disease) of knee, Heart failure, Hemorrhoid, HTN (hypertension), Insomnia, Obesity, Personal history of kidney stones, Tobacco abuse, and Type 2 diabetes  mellitus.     PAST SURGICAL HISTORY   has a past surgical history that includes cyst removed from back.     FAMILY HISTORY  family history includes Anxiety disorder in his father, mother, and sister; Colon polyps in his brother; Diverticulosis in his mother; Hypertension in his father and mother; Irritable bowel syndrome in his mother.     SOCIAL HISTORY   reports that he has quit smoking. His smoking use included cigars. He uses smokeless tobacco. He reports that he does not drink alcohol and does not use drugs.     ALLERGIES   Review of patient's allergies indicates:   Allergen Reactions    Cyclobenzaprine Hives and Other (See Comments)     Other reaction(s): Nausea        MEDICATIONS  Current Outpatient Medications on File Prior to Visit   Medication Sig Dispense Refill    ALPRAZolam (XANAX) 0.5 MG tablet Take 1 tablet (0.5 mg total) by mouth 3 (three) times daily as needed for Anxiety. 90 tablet 2    alprostadil (MUSE) 250 MCG pellet 1 each (250 mcg total) by Transurethral route as needed for Erectile Dysfunction. use no more than 3 times per week (Patient not taking: Reported on 11/29/2021) 6 each 1    amLODIPine (NORVASC) 10 MG tablet Take 1 tablet (10 mg total) by mouth once daily. 90 tablet 3    dulaglutide (TRULICITY) 0.75 mg/0.5 mL pen injector Inject 0.75 mg into the skin every 7 days. (Patient not taking: Reported on 11/23/2022) 12 pen 1    flash glucose scanning reader (FREESTYLE BRIAN 10 DAY READER) Misc 1 Units by Misc.(Non-Drug; Combo Route) route 3 (three) times daily with meals. 1 each 0    flash glucose sensor (FREESTYLE BRIAN 10 DAY SENSOR) Kit 1 Units by Misc.(Non-Drug; Combo Route) route 3 (three) times daily with meals. 1 kit 11    HYDROcodone-acetaminophen (NORCO) 7.5-325 mg per tablet Take 1 tablet by mouth every 6 (six) hours as needed. 30 tablet 0    lancets Misc To check BG 3 times daily, to use with insurance preferred meter 300 each 3    lisinopriL (PRINIVIL,ZESTRIL) 40 MG tablet Take 1  "tablet (40 mg total) by mouth once daily. 90 tablet 1    metFORMIN (GLUCOPHAGE) 1000 MG tablet TAKE 1/2 (one-half) TABLET BY MOUTH TWICE A DAY with meals 180 tablet 0    metoprolol succinate (TOPROL-XL) 25 MG 24 hr tablet Take 1 tablet (25 mg total) by mouth once daily. 90 tablet 3    naproxen (NAPROSYN) 500 MG tablet Take 1 tablet (500 mg total) by mouth 2 (two) times daily with meals. 60 tablet 0    pen needle, diabetic 32 gauge x 5/32" Ndle 1 Units by Misc.(Non-Drug; Combo Route) route every evening. 200 each 3    rosuvastatin (CRESTOR) 5 MG tablet Take 1 tablet (5 mg total) by mouth once daily. (Patient not taking: Reported on 11/29/2021) 90 tablet 3    TRUE METRIX AIR GLUCOSE METER Misc use to test sugars THREE TIMES A DAY      TRUE METRIX GLUCOSE TEST STRIP Strp use to test blood sugars THREE TIMES A  strip 0    TRUEPLUS LANCETS 30 gauge Misc use to test sugars THREE TIMES A  each 2    zolpidem (AMBIEN) 10 mg Tab Take 1 tablet (10 mg total) by mouth every evening. 30 tablet 2     No current facility-administered medications on file prior to visit.          PHYSICAL EXAM   height is 5' 9" (1.753 m) and weight is 107.3 kg (236 lb 9.6 oz).   Body mass index is 34.94 kg/m².      All other systems deferred.  GENERAL:  No acute distress  HABITUS: Obese  GAIT: Antalgic  SKIN: Normal     KNEE EXAM:    left:   Effusion: Small joint effusion  TTP: yes over Medial Joint Line   Varus deformity   yes crepitus with passive knee ROM  Passive ROM: Extension 0, Flexion 110  No pain with manipulation of patella  Stable to varus/valgus stress. No increased laxity to anterior/posterior drawer testing  negative Pamela's test  No pain with IR/ER rotation of the hip  5/5 strength in knee flexion and extension, ankle plantarflexion and dorsiflexion  Neurovascular Status: Sensation intact to light touch in Sural, Saphenous, SPN, DPN, Tibial nerve distribution  2+ pulse DP/PT, normal capillary refill, foot has normal " warmth    DATA:  Diagnostic tests reviewed for today's visit:     4v of the knee reveal Severe degenerative changes of the Medial and Patellofemoral compartment. There is evidence of advanced osteoarthritis changes with Subchondral sclerosis, Osteophyte formation, and Joint space narrowing. The limb is in varus alignment. The patella is tracking midline.

## 2023-02-28 NOTE — TELEPHONE ENCOUNTER
----- Message from Dwight Lopez MA sent at 2/28/2023  2:50 PM CST -----  Type: Patient Call Back    Who called: Self    What is the request in detail: pt needs a medical clearance for a surgery being done on April 5th.. he is asking to be scheduled with his provider ..     Can the clinic reply by [facility]?No    Would the patient rather a call back or a response via [facility]? Yes    Best call back number: [phone]

## 2023-03-09 ENCOUNTER — HOSPITAL ENCOUNTER (OUTPATIENT)
Dept: RADIOLOGY | Facility: HOSPITAL | Age: 68
Discharge: HOME | End: 2023-03-09
Attending: ORTHOPAEDIC SURGERY
Payer: MEDICARE

## 2023-03-09 DIAGNOSIS — M17.12 PRIMARY OSTEOARTHRITIS OF LEFT KNEE: ICD-10-CM

## 2023-03-09 PROCEDURE — 73700 CT KNEE WITHOUT CONTRAST LEFT W/MAKO PROTOCOL: ICD-10-PCS | Mod: 26,HCNC,LT, | Performed by: RADIOLOGY

## 2023-03-09 PROCEDURE — 73700 CT LOWER EXTREMITY W/O DYE: CPT | Mod: TC,HCNC,LT

## 2023-03-09 PROCEDURE — 73700 CT LOWER EXTREMITY W/O DYE: CPT | Mod: 26,HCNC,LT, | Performed by: RADIOLOGY

## 2023-03-22 ENCOUNTER — HOSPITAL ENCOUNTER (OUTPATIENT)
Dept: PREADMISSION TESTING | Facility: HOSPITAL | Age: 68
Discharge: HOME | End: 2023-03-22
Attending: ORTHOPAEDIC SURGERY
Payer: MEDICARE

## 2023-03-22 ENCOUNTER — HOSPITAL ENCOUNTER (OUTPATIENT)
Dept: RADIOLOGY | Facility: HOSPITAL | Age: 68
Discharge: HOME | End: 2023-03-22
Attending: ORTHOPAEDIC SURGERY
Payer: MEDICARE

## 2023-03-22 ENCOUNTER — ANESTHESIA EVENT (OUTPATIENT)
Dept: SURGERY | Facility: HOSPITAL | Age: 68
End: 2023-03-22
Payer: MEDICARE

## 2023-03-22 VITALS
OXYGEN SATURATION: 95 % | SYSTOLIC BLOOD PRESSURE: 108 MMHG | WEIGHT: 238.13 LBS | HEART RATE: 52 BPM | TEMPERATURE: 98 F | BODY MASS INDEX: 35.27 KG/M2 | HEIGHT: 69 IN | DIASTOLIC BLOOD PRESSURE: 60 MMHG | RESPIRATION RATE: 20 BRPM

## 2023-03-22 DIAGNOSIS — E16.1 OTHER HYPOGLYCEMIA: ICD-10-CM

## 2023-03-22 DIAGNOSIS — Z01.818 PREOP TESTING: Primary | ICD-10-CM

## 2023-03-22 DIAGNOSIS — M25.69 STIFFNESS OF OTHER SPECIFIED JOINT, NOT ELSEWHERE CLASSIFIED: ICD-10-CM

## 2023-03-22 DIAGNOSIS — M24.89 OTHER SPECIFIC JOINT DERANGEMENT OF OTHER SPECIFIED JOINT, NOT ELSEWHERE CLASSIFIED: ICD-10-CM

## 2023-03-22 LAB
ALBUMIN SERPL BCP-MCNC: 3.7 G/DL (ref 3.5–5.2)
ALP SERPL-CCNC: 62 U/L (ref 55–135)
ALT SERPL W/O P-5'-P-CCNC: 32 U/L (ref 10–44)
ANION GAP SERPL CALC-SCNC: 8 MMOL/L (ref 8–16)
APTT BLDCRRT: 28.9 SEC (ref 21–32)
AST SERPL-CCNC: 20 U/L (ref 10–40)
BASOPHILS # BLD AUTO: 0.07 K/UL (ref 0–0.2)
BASOPHILS NFR BLD: 0.9 % (ref 0–1.9)
BILIRUB SERPL-MCNC: 0.9 MG/DL (ref 0.1–1)
BILIRUB UR QL STRIP: NEGATIVE
BUN SERPL-MCNC: 12 MG/DL (ref 8–23)
CALCIUM SERPL-MCNC: 8.8 MG/DL (ref 8.7–10.5)
CHLORIDE SERPL-SCNC: 109 MMOL/L (ref 95–110)
CLARITY UR: CLEAR
CO2 SERPL-SCNC: 23 MMOL/L (ref 23–29)
COLOR UR: YELLOW
CREAT SERPL-MCNC: 0.9 MG/DL (ref 0.5–1.4)
DIFFERENTIAL METHOD: NORMAL
EOSINOPHIL # BLD AUTO: 0.2 K/UL (ref 0–0.5)
EOSINOPHIL NFR BLD: 2.4 % (ref 0–8)
ERYTHROCYTE [DISTWIDTH] IN BLOOD BY AUTOMATED COUNT: 13.3 % (ref 11.5–14.5)
EST. GFR  (NO RACE VARIABLE): >60 ML/MIN/1.73 M^2
ESTIMATED AVG GLUCOSE: 131 MG/DL (ref 68–131)
GLUCOSE SERPL-MCNC: 98 MG/DL (ref 70–110)
GLUCOSE UR QL STRIP: NEGATIVE
HBA1C MFR BLD: 6.2 % (ref 4–5.6)
HCT VFR BLD AUTO: 42.7 % (ref 40–54)
HGB BLD-MCNC: 14.2 G/DL (ref 14–18)
HGB UR QL STRIP: NEGATIVE
IMM GRANULOCYTES # BLD AUTO: 0.02 K/UL (ref 0–0.04)
IMM GRANULOCYTES NFR BLD AUTO: 0.3 % (ref 0–0.5)
INR PPP: 1 (ref 0.8–1.2)
KETONES UR QL STRIP: NEGATIVE
LEUKOCYTE ESTERASE UR QL STRIP: NEGATIVE
LYMPHOCYTES # BLD AUTO: 2.5 K/UL (ref 1–4.8)
LYMPHOCYTES NFR BLD: 33.9 % (ref 18–48)
MCH RBC QN AUTO: 29.9 PG (ref 27–31)
MCHC RBC AUTO-ENTMCNC: 33.3 G/DL (ref 32–36)
MCV RBC AUTO: 90 FL (ref 82–98)
MONOCYTES # BLD AUTO: 0.7 K/UL (ref 0.3–1)
MONOCYTES NFR BLD: 9.6 % (ref 4–15)
NEUTROPHILS # BLD AUTO: 4 K/UL (ref 1.8–7.7)
NEUTROPHILS NFR BLD: 52.9 % (ref 38–73)
NITRITE UR QL STRIP: NEGATIVE
NRBC BLD-RTO: 0 /100 WBC
PH UR STRIP: 6 [PH] (ref 5–8)
PLATELET # BLD AUTO: 285 K/UL (ref 150–450)
PMV BLD AUTO: 9.7 FL (ref 9.2–12.9)
POTASSIUM SERPL-SCNC: 3.9 MMOL/L (ref 3.5–5.1)
PROT SERPL-MCNC: 7 G/DL (ref 6–8.4)
PROT UR QL STRIP: NEGATIVE
PROTHROMBIN TIME: 10.6 SEC (ref 9–12.5)
RBC # BLD AUTO: 4.75 M/UL (ref 4.6–6.2)
SODIUM SERPL-SCNC: 140 MMOL/L (ref 136–145)
SP GR UR STRIP: 1.02 (ref 1–1.03)
URN SPEC COLLECT METH UR: NORMAL
UROBILINOGEN UR STRIP-ACNC: NEGATIVE EU/DL
WBC # BLD AUTO: 7.49 K/UL (ref 3.9–12.7)

## 2023-03-22 PROCEDURE — 71046 XR CHEST PA AND LATERAL PRE-OP: ICD-10-PCS | Mod: 26,HCNC,, | Performed by: INTERNAL MEDICINE

## 2023-03-22 PROCEDURE — 85730 THROMBOPLASTIN TIME PARTIAL: CPT | Mod: HCNC | Performed by: ORTHOPAEDIC SURGERY

## 2023-03-22 PROCEDURE — 85025 COMPLETE CBC W/AUTO DIFF WBC: CPT | Mod: HCNC | Performed by: ORTHOPAEDIC SURGERY

## 2023-03-22 PROCEDURE — 71046 X-RAY EXAM CHEST 2 VIEWS: CPT | Mod: 26,HCNC,, | Performed by: INTERNAL MEDICINE

## 2023-03-22 PROCEDURE — 93005 ELECTROCARDIOGRAM TRACING: CPT | Mod: HCNC

## 2023-03-22 PROCEDURE — 93010 EKG 12-LEAD: ICD-10-PCS | Mod: HCNC,,, | Performed by: INTERNAL MEDICINE

## 2023-03-22 PROCEDURE — 83036 HEMOGLOBIN GLYCOSYLATED A1C: CPT | Mod: HCNC | Performed by: ORTHOPAEDIC SURGERY

## 2023-03-22 PROCEDURE — 71046 X-RAY EXAM CHEST 2 VIEWS: CPT | Mod: TC,HCNC,FY

## 2023-03-22 PROCEDURE — 93010 ELECTROCARDIOGRAM REPORT: CPT | Mod: HCNC,,, | Performed by: INTERNAL MEDICINE

## 2023-03-22 PROCEDURE — 80053 COMPREHEN METABOLIC PANEL: CPT | Mod: HCNC | Performed by: ORTHOPAEDIC SURGERY

## 2023-03-22 PROCEDURE — 81003 URINALYSIS AUTO W/O SCOPE: CPT | Mod: HCNC | Performed by: ORTHOPAEDIC SURGERY

## 2023-03-22 PROCEDURE — 85610 PROTHROMBIN TIME: CPT | Mod: HCNC | Performed by: ORTHOPAEDIC SURGERY

## 2023-03-22 PROCEDURE — 36415 COLL VENOUS BLD VENIPUNCTURE: CPT | Mod: HCNC | Performed by: ORTHOPAEDIC SURGERY

## 2023-03-22 NOTE — DISCHARGE INSTRUCTIONS

## 2023-03-22 NOTE — PRE-PROCEDURE INSTRUCTIONS
Patient is aware he needs PCP clearance. He is waiting to hear back from office will call today to check.  Patient will get clearance from Dr. Gomez as well due to hx of CHF in chart and abnormal EKG in pre-op. [facility]. Denies any heart problems.

## 2023-03-22 NOTE — ANESTHESIA PREPROCEDURE EVALUATION
03/22/2023  Eyad Rodriguez is a 67 y.o., male scheduled for ARTHROPLASTY, KNEE, TOTAL. HUGO (Left) -on 4/5/2023.    NPO >8  METS >4    Vitals:    04/05/23 0613   BP: (!) 142/67   BP Location: Left arm   Patient Position: Lying   Pulse: (!) 42   Resp: 17   Temp: 36.6 °C (97.8 °F)   TempSrc: Oral   SpO2: (!) 93%         Past Medical History:   Diagnosis Date    Angina pectoris     Anxiety     BPH (benign prostatic hypertrophy)     Chronic low back pain     followed by pain management    Disorder of kidney and ureter     DJD (degenerative joint disease) of knee     Heart failure     Hemorrhoid     HTN (hypertension)     Insomnia     Obesity     Personal history of kidney stones     Tobacco abuse     Type 2 diabetes mellitus        Past Surgical History:   Procedure Laterality Date    cyst removed from back       Lab Results   Component Value Date    WBC 7.49 03/22/2023    HGB 14.2 03/22/2023    HCT 42.7 03/22/2023    MCV 90 03/22/2023     03/22/2023       CMP  Sodium   Date Value Ref Range Status   03/22/2023 140 136 - 145 mmol/L Final     Potassium   Date Value Ref Range Status   03/22/2023 3.9 3.5 - 5.1 mmol/L Final     Chloride   Date Value Ref Range Status   03/22/2023 109 95 - 110 mmol/L Final     CO2   Date Value Ref Range Status   03/22/2023 23 23 - 29 mmol/L Final     Glucose   Date Value Ref Range Status   03/22/2023 98 70 - 110 mg/dL Final     BUN   Date Value Ref Range Status   03/22/2023 12 8 - 23 mg/dL Final     Creatinine   Date Value Ref Range Status   03/22/2023 0.9 0.5 - 1.4 mg/dL Final     Calcium   Date Value Ref Range Status   03/22/2023 8.8 8.7 - 10.5 mg/dL Final     Total Protein   Date Value Ref Range Status   03/22/2023 7.0 6.0 - 8.4 g/dL Final     Albumin   Date Value Ref Range Status   03/22/2023 3.7 3.5 - 5.2 g/dL Final     Total Bilirubin   Date Value Ref  Range Status   03/22/2023 0.9 0.1 - 1.0 mg/dL Final     Comment:     For infants and newborns, interpretation of results should be based  on gestational age, weight and in agreement with clinical  observations.    Premature Infant recommended reference ranges:  Up to 24 hours.............<8.0 mg/dL  Up to 48 hours............<12.0 mg/dL  3-5 days..................<15.0 mg/dL  6-29 days.................<15.0 mg/dL       Alkaline Phosphatase   Date Value Ref Range Status   03/22/2023 62 55 - 135 U/L Final     AST   Date Value Ref Range Status   03/22/2023 20 10 - 40 U/L Final     ALT   Date Value Ref Range Status   03/22/2023 32 10 - 44 U/L Final     Anion Gap   Date Value Ref Range Status   03/22/2023 8 8 - 16 mmol/L Final     eGFR   Date Value Ref Range Status   03/22/2023 >60 >60 mL/min/1.73 m^2 Final     EKG  Vent. Rate : 049 BPM     Atrial Rate : 049 BPM      P-R Int : 184 ms          QRS Dur : 092 ms       QT Int : 426 ms       P-R-T Axes : 072 048 042 degrees      QTc Int : 384 ms     Sinus bradycardia   Anterior infarct ,age undetermined   Abnormal ECG   When compared with ECG of 30-DEC-2019 11:39,   Significant changes have occurred   Confirmed by Danilo Gomez MD ([id number]) on 3/22/2023 2:36:58 PM       Pre-op Assessment    I have reviewed the Patient Summary Reports.     I have reviewed the Nursing Notes. I have reviewed the NPO Status.   I have reviewed the Medications.     Review of Systems  Anesthesia Hx:  No problems with previous Anesthesia  History of prior surgery of interest to airway management or planning: Denies Family Hx of Anesthesia complications.   Denies Personal Hx of Anesthesia complications.   Social:  Non-Smoker, No Alcohol Use    Hematology/Oncology:  Hematology Normal   Oncology Normal     EENT/Dental:EENT/Dental Normal   Cardiovascular:   Exercise tolerance: good Hypertension  Denies Angina.  Denies MONTGOMERY. ECG has been reviewed. Nuclear Stress 3/28/23:  Normal myocardial perfusion scan.  There is no evidence of myocardial ischemia or infarction.    There is a  mild to moderate intensity fixed perfusion abnormality in the inferior wall of the left ventricle secondary to diaphragm attenuation.    The gated perfusion images showed an ejection fraction of 52% post stress.    There is normal wall motion post stress.    ECHO 3/28/23:  ? The left ventricle is normal in size with mild eccentric hypertrophy and normal systolic function.  ? The estimated ejection fraction is 65%.  ? Normal right ventricular size with normal right ventricular systolic function.  ? The sinuses of Valsalva is mildly dilated, 3.9cm.   Functional Capacity good / => 4 METS, walked 5 miles recently    Pulmonary:  Pulmonary Normal    Musculoskeletal:   Arthritis  L knee   Neurological:  Neurology Normal    Endocrine:   Diabetes, type 2  Obesity / BMI > 30  Dermatological:  Skin Normal    Psych:  Psychiatric Normal              Anesthesia Plan  Type of Anesthesia, risks & benefits discussed:    Anesthesia Type: Spinal  Intra-op Monitoring Plan: Standard ASA Monitors  Post Op Pain Control Plan: peripheral nerve block and multimodal analgesia  Induction:  IV  Informed Consent: Informed consent signed with the Patient and all parties understand the risks and agree with anesthesia plan.  All questions answered. Patient consented to blood products? Yes  ASA Score: 3  Anesthesia Plan Notes: PMHx significant for HTN, Angina, athersclerosis of aorta, Cardiomyopathy, CHF     Cleared for knee surgery at moderate cardiac risk  Continue Rx for HTN, diastolic CHF, DM  OV 1 year.    A1c 6.2 %    Ready For Surgery From Anesthesia Perspective.     .

## 2023-03-23 ENCOUNTER — TELEPHONE (OUTPATIENT)
Dept: FAMILY MEDICINE | Facility: CLINIC | Age: 68
End: 2023-03-23
Payer: MEDICARE

## 2023-03-23 NOTE — TELEPHONE ENCOUNTER
Call placed to patient in regards to pre-op appointment. Patient scheduled 3-24-23, understanding and thank you verbalized.

## 2023-03-23 NOTE — TELEPHONE ENCOUNTER
----- Message from Michelle Gaston sent at 3/22/2023  3:56 PM CDT -----  Name of Who is Calling: JENNIFER KUHN [[id number]]              What is the request in detail: Patient requesting a call back to discuss clearance needed for surgery              Can the clinic reply by [facility]: No              What Number to Call Back if not in [facility]: [phone]

## 2023-03-24 ENCOUNTER — OFFICE VISIT (OUTPATIENT)
Dept: FAMILY MEDICINE | Facility: CLINIC | Age: 68
End: 2023-03-24
Payer: MEDICARE

## 2023-03-24 VITALS
HEART RATE: 50 BPM | SYSTOLIC BLOOD PRESSURE: 110 MMHG | OXYGEN SATURATION: 97 % | DIASTOLIC BLOOD PRESSURE: 58 MMHG | BODY MASS INDEX: 35 KG/M2 | HEIGHT: 69 IN | TEMPERATURE: 98 F | WEIGHT: 236.31 LBS

## 2023-03-24 DIAGNOSIS — I20.89 ANGINA AT REST: ICD-10-CM

## 2023-03-24 DIAGNOSIS — I70.0 ATHEROSCLEROSIS OF AORTA: ICD-10-CM

## 2023-03-24 DIAGNOSIS — M17.12 PRIMARY OSTEOARTHRITIS OF LEFT KNEE: ICD-10-CM

## 2023-03-24 DIAGNOSIS — I42.9 CARDIOMYOPATHY, UNSPECIFIED TYPE: ICD-10-CM

## 2023-03-24 DIAGNOSIS — I10 ESSENTIAL HYPERTENSION: Chronic | ICD-10-CM

## 2023-03-24 DIAGNOSIS — I50.9 CONGESTIVE HEART FAILURE, UNSPECIFIED HF CHRONICITY, UNSPECIFIED HEART FAILURE TYPE: ICD-10-CM

## 2023-03-24 DIAGNOSIS — Z01.818 PRE-OPERATIVE EXAMINATION: Primary | ICD-10-CM

## 2023-03-24 DIAGNOSIS — E11.9 CONTROLLED TYPE 2 DIABETES MELLITUS WITHOUT COMPLICATION, WITHOUT LONG-TERM CURRENT USE OF INSULIN: ICD-10-CM

## 2023-03-24 PROCEDURE — 99999 PR PBB SHADOW E&M-EST. PATIENT-LVL V: ICD-10-PCS | Mod: PBBFAC,HCNC,, | Performed by: NURSE PRACTITIONER

## 2023-03-24 PROCEDURE — 3078F PR MOST RECENT DIASTOLIC BLOOD PRESSURE < 80 MM HG: ICD-10-PCS | Mod: HCNC,CPTII,S$GLB, | Performed by: NURSE PRACTITIONER

## 2023-03-24 PROCEDURE — 3288F PR FALLS RISK ASSESSMENT DOCUMENTED: ICD-10-PCS | Mod: HCNC,CPTII,S$GLB, | Performed by: NURSE PRACTITIONER

## 2023-03-24 PROCEDURE — 1125F AMNT PAIN NOTED PAIN PRSNT: CPT | Mod: HCNC,CPTII,S$GLB, | Performed by: NURSE PRACTITIONER

## 2023-03-24 PROCEDURE — 99999 PR PBB SHADOW E&M-EST. PATIENT-LVL V: CPT | Mod: PBBFAC,HCNC,, | Performed by: NURSE PRACTITIONER

## 2023-03-24 PROCEDURE — 3008F PR BODY MASS INDEX (BMI) DOCUMENTED: ICD-10-PCS | Mod: HCNC,CPTII,S$GLB, | Performed by: NURSE PRACTITIONER

## 2023-03-24 PROCEDURE — 3044F HG A1C LEVEL LT 7.0%: CPT | Mod: HCNC,CPTII,S$GLB, | Performed by: NURSE PRACTITIONER

## 2023-03-24 PROCEDURE — 3008F BODY MASS INDEX DOCD: CPT | Mod: HCNC,CPTII,S$GLB, | Performed by: NURSE PRACTITIONER

## 2023-03-24 PROCEDURE — 3288F FALL RISK ASSESSMENT DOCD: CPT | Mod: HCNC,CPTII,S$GLB, | Performed by: NURSE PRACTITIONER

## 2023-03-24 PROCEDURE — 99214 PR OFFICE/OUTPT VISIT, EST, LEVL IV, 30-39 MIN: ICD-10-PCS | Mod: HCNC,S$GLB,, | Performed by: NURSE PRACTITIONER

## 2023-03-24 PROCEDURE — 1125F PR PAIN SEVERITY QUANTIFIED, PAIN PRESENT: ICD-10-PCS | Mod: HCNC,CPTII,S$GLB, | Performed by: NURSE PRACTITIONER

## 2023-03-24 PROCEDURE — 3074F SYST BP LT 130 MM HG: CPT | Mod: HCNC,CPTII,S$GLB, | Performed by: NURSE PRACTITIONER

## 2023-03-24 PROCEDURE — 3044F PR MOST RECENT HEMOGLOBIN A1C LEVEL <7.0%: ICD-10-PCS | Mod: HCNC,CPTII,S$GLB, | Performed by: NURSE PRACTITIONER

## 2023-03-24 PROCEDURE — 1159F MED LIST DOCD IN RCRD: CPT | Mod: HCNC,CPTII,S$GLB, | Performed by: NURSE PRACTITIONER

## 2023-03-24 PROCEDURE — 1101F PR PT FALLS ASSESS DOC 0-1 FALLS W/OUT INJ PAST YR: ICD-10-PCS | Mod: HCNC,CPTII,S$GLB, | Performed by: NURSE PRACTITIONER

## 2023-03-24 PROCEDURE — 1101F PT FALLS ASSESS-DOCD LE1/YR: CPT | Mod: HCNC,CPTII,S$GLB, | Performed by: NURSE PRACTITIONER

## 2023-03-24 PROCEDURE — 1159F PR MEDICATION LIST DOCUMENTED IN MEDICAL RECORD: ICD-10-PCS | Mod: HCNC,CPTII,S$GLB, | Performed by: NURSE PRACTITIONER

## 2023-03-24 PROCEDURE — 99214 OFFICE O/P EST MOD 30 MIN: CPT | Mod: HCNC,S$GLB,, | Performed by: NURSE PRACTITIONER

## 2023-03-24 PROCEDURE — 1160F PR REVIEW ALL MEDS BY PRESCRIBER/CLIN PHARMACIST DOCUMENTED: ICD-10-PCS | Mod: HCNC,CPTII,S$GLB, | Performed by: NURSE PRACTITIONER

## 2023-03-24 PROCEDURE — 1160F RVW MEDS BY RX/DR IN RCRD: CPT | Mod: HCNC,CPTII,S$GLB, | Performed by: NURSE PRACTITIONER

## 2023-03-24 PROCEDURE — 4010F ACE/ARB THERAPY RXD/TAKEN: CPT | Mod: HCNC,CPTII,S$GLB, | Performed by: NURSE PRACTITIONER

## 2023-03-24 PROCEDURE — 3074F PR MOST RECENT SYSTOLIC BLOOD PRESSURE < 130 MM HG: ICD-10-PCS | Mod: HCNC,CPTII,S$GLB, | Performed by: NURSE PRACTITIONER

## 2023-03-24 PROCEDURE — 4010F PR ACE/ARB THEARPY RXD/TAKEN: ICD-10-PCS | Mod: HCNC,CPTII,S$GLB, | Performed by: NURSE PRACTITIONER

## 2023-03-24 PROCEDURE — 3078F DIAST BP <80 MM HG: CPT | Mod: HCNC,CPTII,S$GLB, | Performed by: NURSE PRACTITIONER

## 2023-03-24 NOTE — PROGRESS NOTES
" Subjective:      Eyad Rodriguez is a 67 y.o. male who presents to the office today for a preoperative consultation at the request of surgeon Dr. Arreola who plans on performing left total knee replacement on April 5. This consultation is requested for the specific conditions prompting preoperative evaluation (i.e. because of potential affect on operative risk): T2DM, HTN, and cardiomyopathy. Planned anesthesia: general. The patient has the following known anesthesia issues:  no known anesthesia complications . Patients bleeding risk: no recent abnormal bleeding and no remote history of abnormal bleeding. Patient does not have objections to receiving blood products if needed.    NSAID/OAC use: Naproxen  Corticosteroid use: None  Recent fever/illness/antibiotics use: None    The following portions of the patient's history were reviewed and updated as appropriate: allergies, current medications, past family history, past medical history, past social history, past surgical history, and problem list.    Review of Systems  A comprehensive review of systems was negative.   Negative for chest pain, palpitations, MONTGOMERY, orthopnea, or edema.     Objective:      BP (!) 110/58 (BP Location: Right arm, Patient Position: Sitting, BP Method: Large (Automatic))   Pulse (!) 50   Temp 97.8 °F (36.6 °C) (Oral)   Ht 5' 9" (1.753 m)   Wt 107.2 kg (236 lb 5.3 oz)   SpO2 97%   BMI 34.90 kg/m²   General appearance: alert, appears stated age, cooperative, and no distress  Neck: no carotid bruit, no JVD, supple, symmetrical, trachea midline, and thyroid not enlarged, symmetric, no tenderness/mass/nodules  Lungs: clear to auscultation bilaterally  Heart: regular rate and rhythm, S1, S2 normal, no murmur, click, rub or gallop  Extremities: extremities normal, atraumatic, no cyanosis or edema  Pulses: 2+ and symmetric  Skin: Skin color, texture, turgor normal. No rashes or lesions  Neurologic: Grossly normal    Predictors of intubation " difficulty:   Morbid obesity? yes - BMI 34   Anatomically abnormal facies? no   Prominent incisors? no   Receding mandible? no   Short, thick neck? no   Neck range of motion: normal   Mallampati score: II (hard and soft palate, upper portion of tonsils anduvula visible)   Mouth opening: WNL   Dentition:  wears dentures    Cardiographics  ECG:  sinus bradycardia with significant changes from previous EKG    Imaging  Chest x-ray:  no acute cardiopulmonary processes      Lab Review   not applicable  CBC, CMP, UA, and hemoglobin A1c WNL.      Assessment:        67 y.o. male with planned surgery as above.    Known risk factors for perioperative complications: Diabetes mellitus    Difficulty with intubation is not anticipated.    Cardiac Risk Estimation: 0.5% risk with 3/9% risk of serious complication according to surgical risk calculator    Current medications which may produce withdrawal symptoms if withheld perioperatively: hydrocodone      Plan:     Pre-operative examination  Given new/acute EKG changes, will refer to cardiology for evaluation and pre-operative clearance.  -     Ambulatory referral/consult to Cardiology; Future; Expected date: 03/31/2023    Primary osteoarthritis of left knee  The current medical regimen is effective;  continue present plan and medications.    Controlled type 2 diabetes mellitus without complication, without long-term current use of insulin  The current medical regimen is effective;  continue present plan and medications.    Essential hypertension  The current medical regimen is effective;  continue present plan and medications.    Angina at rest  The current medical regimen is effective;  continue present plan and medications.    Congestive heart failure, unspecified HF chronicity, unspecified heart failure type  The current medical regimen is effective;  continue present plan and medications.    Cardiomyopathy, unspecified type  The current medical regimen is effective;  continue  present plan and medications.    Atherosclerosis of aorta  Stable. Monitor.    Patient has verbalized understanding and is in agreement with plan of care.    Follow up if symptoms worsen or fail to improve.    Clearance pending cardiology evaluation and clearance.

## 2023-03-28 ENCOUNTER — OFFICE VISIT (OUTPATIENT)
Dept: CARDIOLOGY | Facility: CLINIC | Age: 68
End: 2023-03-28
Payer: MEDICARE

## 2023-03-28 VITALS
HEIGHT: 69 IN | HEART RATE: 62 BPM | RESPIRATION RATE: 18 BRPM | OXYGEN SATURATION: 98 % | WEIGHT: 235.69 LBS | SYSTOLIC BLOOD PRESSURE: 130 MMHG | BODY MASS INDEX: 34.91 KG/M2 | DIASTOLIC BLOOD PRESSURE: 62 MMHG

## 2023-03-28 DIAGNOSIS — I70.0 ATHEROSCLEROSIS OF AORTA: ICD-10-CM

## 2023-03-28 DIAGNOSIS — I20.89 ANGINA AT REST: ICD-10-CM

## 2023-03-28 DIAGNOSIS — Z01.818 PRE-OPERATIVE EXAMINATION: ICD-10-CM

## 2023-03-28 DIAGNOSIS — I10 ESSENTIAL HYPERTENSION: Primary | Chronic | ICD-10-CM

## 2023-03-28 DIAGNOSIS — I42.9 CARDIOMYOPATHY, UNSPECIFIED TYPE: ICD-10-CM

## 2023-03-28 DIAGNOSIS — I50.9 CONGESTIVE HEART FAILURE, UNSPECIFIED HF CHRONICITY, UNSPECIFIED HEART FAILURE TYPE: ICD-10-CM

## 2023-03-28 PROCEDURE — 4010F ACE/ARB THERAPY RXD/TAKEN: CPT | Mod: HCNC,CPTII,S$GLB, | Performed by: INTERNAL MEDICINE

## 2023-03-28 PROCEDURE — 99999 PR PBB SHADOW E&M-EST. PATIENT-LVL IV: CPT | Mod: PBBFAC,HCNC,, | Performed by: INTERNAL MEDICINE

## 2023-03-28 PROCEDURE — 3008F BODY MASS INDEX DOCD: CPT | Mod: HCNC,CPTII,S$GLB, | Performed by: INTERNAL MEDICINE

## 2023-03-28 PROCEDURE — 99999 PR PBB SHADOW E&M-EST. PATIENT-LVL IV: ICD-10-PCS | Mod: PBBFAC,HCNC,, | Performed by: INTERNAL MEDICINE

## 2023-03-28 PROCEDURE — 1125F PR PAIN SEVERITY QUANTIFIED, PAIN PRESENT: ICD-10-PCS | Mod: HCNC,CPTII,S$GLB, | Performed by: INTERNAL MEDICINE

## 2023-03-28 PROCEDURE — 3078F PR MOST RECENT DIASTOLIC BLOOD PRESSURE < 80 MM HG: ICD-10-PCS | Mod: HCNC,CPTII,S$GLB, | Performed by: INTERNAL MEDICINE

## 2023-03-28 PROCEDURE — 3044F PR MOST RECENT HEMOGLOBIN A1C LEVEL <7.0%: ICD-10-PCS | Mod: HCNC,CPTII,S$GLB, | Performed by: INTERNAL MEDICINE

## 2023-03-28 PROCEDURE — 99204 OFFICE O/P NEW MOD 45 MIN: CPT | Mod: HCNC,S$GLB,, | Performed by: INTERNAL MEDICINE

## 2023-03-28 PROCEDURE — 1159F PR MEDICATION LIST DOCUMENTED IN MEDICAL RECORD: ICD-10-PCS | Mod: HCNC,CPTII,S$GLB, | Performed by: INTERNAL MEDICINE

## 2023-03-28 PROCEDURE — 4010F PR ACE/ARB THEARPY RXD/TAKEN: ICD-10-PCS | Mod: HCNC,CPTII,S$GLB, | Performed by: INTERNAL MEDICINE

## 2023-03-28 PROCEDURE — 1101F PR PT FALLS ASSESS DOC 0-1 FALLS W/OUT INJ PAST YR: ICD-10-PCS | Mod: HCNC,CPTII,S$GLB, | Performed by: INTERNAL MEDICINE

## 2023-03-28 PROCEDURE — 1159F MED LIST DOCD IN RCRD: CPT | Mod: HCNC,CPTII,S$GLB, | Performed by: INTERNAL MEDICINE

## 2023-03-28 PROCEDURE — 3008F PR BODY MASS INDEX (BMI) DOCUMENTED: ICD-10-PCS | Mod: HCNC,CPTII,S$GLB, | Performed by: INTERNAL MEDICINE

## 2023-03-28 PROCEDURE — 3078F DIAST BP <80 MM HG: CPT | Mod: HCNC,CPTII,S$GLB, | Performed by: INTERNAL MEDICINE

## 2023-03-28 PROCEDURE — 1101F PT FALLS ASSESS-DOCD LE1/YR: CPT | Mod: HCNC,CPTII,S$GLB, | Performed by: INTERNAL MEDICINE

## 2023-03-28 PROCEDURE — 3075F SYST BP GE 130 - 139MM HG: CPT | Mod: HCNC,CPTII,S$GLB, | Performed by: INTERNAL MEDICINE

## 2023-03-28 PROCEDURE — 3288F FALL RISK ASSESSMENT DOCD: CPT | Mod: HCNC,CPTII,S$GLB, | Performed by: INTERNAL MEDICINE

## 2023-03-28 PROCEDURE — 99204 PR OFFICE/OUTPT VISIT, NEW, LEVL IV, 45-59 MIN: ICD-10-PCS | Mod: HCNC,S$GLB,, | Performed by: INTERNAL MEDICINE

## 2023-03-28 PROCEDURE — 3044F HG A1C LEVEL LT 7.0%: CPT | Mod: HCNC,CPTII,S$GLB, | Performed by: INTERNAL MEDICINE

## 2023-03-28 PROCEDURE — 1125F AMNT PAIN NOTED PAIN PRSNT: CPT | Mod: HCNC,CPTII,S$GLB, | Performed by: INTERNAL MEDICINE

## 2023-03-28 PROCEDURE — 3075F PR MOST RECENT SYSTOLIC BLOOD PRESS GE 130-139MM HG: ICD-10-PCS | Mod: HCNC,CPTII,S$GLB, | Performed by: INTERNAL MEDICINE

## 2023-03-28 PROCEDURE — 3288F PR FALLS RISK ASSESSMENT DOCUMENTED: ICD-10-PCS | Mod: HCNC,CPTII,S$GLB, | Performed by: INTERNAL MEDICINE

## 2023-03-28 NOTE — PROGRESS NOTES
Subjective:   Patient ID:  Eyad Rodriguez is a 67 y.o. male who presents for follow-up of No chief complaint on file.      HPI    Last saw me 2015    HTN, diastolic CHF, DM    Stress test 5/12/15  LVEF: 62 %  Impression: NORMAL MYOCARDIAL PERFUSION  1. The perfusion scan is free of evidence for myocardial ischemia or injury.   2. Resting wall motion is physiologic.   3. Resting LV function is normal.      Echo 5/12/15  1 - Normal left ventricular systolic function (EF 55-60%).     EKG 3/22/23 sinus bradycardia 49 PRWP    3/28/23 Needs clearance prior to knee surgery. Mild MONTGOMERY and occasional CP. Activity limited  BP controlled    Review of Systems   Constitutional: Negative for decreased appetite.   HENT:  Negative for ear discharge.    Eyes:  Negative for blurred vision.   Endocrine: Negative for polyphagia.   Skin:  Negative for nail changes.   Genitourinary:  Negative for bladder incontinence.   Neurological:  Negative for aphonia.   Psychiatric/Behavioral:  Negative for hallucinations.    Allergic/Immunologic: Negative for hives.     Objective:   Physical Exam  Constitutional:       Appearance: He is well-developed.   HENT:      Head: Normocephalic and atraumatic.   Eyes:      Conjunctiva/sclera: Conjunctivae normal.      Pupils: Pupils are equal, round, and reactive to light.   Cardiovascular:      Rate and Rhythm: Normal rate.      Pulses: Intact distal pulses.      Heart sounds: Normal heart sounds.   Pulmonary:      Effort: Pulmonary effort is normal.      Breath sounds: Normal breath sounds.   Abdominal:      General: Bowel sounds are normal.      Palpations: Abdomen is soft.   Musculoskeletal:         General: Normal range of motion.      Cervical back: Normal range of motion and neck supple.   Skin:     General: Skin is warm and dry.   Neurological:      Mental Status: He is alert and oriented to person, place, and time.       Assessment:      1. Essential hypertension    2. Pre-operative examination     3. Angina at rest    4. Atherosclerosis of aorta    5. Cardiomyopathy, unspecified type    6. Congestive heart failure, unspecified HF chronicity, unspecified heart failure type        Plan:     Echo and lexiscan myoview for Cp, MONTGOMERY, Hx CHF  Will clear for surgery at moderate cardiac risk if ok

## 2023-03-31 ENCOUNTER — HOSPITAL ENCOUNTER (OUTPATIENT)
Dept: RADIOLOGY | Facility: HOSPITAL | Age: 68
Discharge: HOME | End: 2023-03-31
Attending: INTERNAL MEDICINE
Payer: MEDICARE

## 2023-03-31 ENCOUNTER — HOSPITAL ENCOUNTER (OUTPATIENT)
Dept: CARDIOLOGY | Facility: HOSPITAL | Age: 68
Discharge: HOME | End: 2023-03-31
Attending: INTERNAL MEDICINE
Payer: MEDICARE

## 2023-03-31 DIAGNOSIS — I42.9 CARDIOMYOPATHY, UNSPECIFIED TYPE: ICD-10-CM

## 2023-03-31 DIAGNOSIS — I70.0 ATHEROSCLEROSIS OF AORTA: ICD-10-CM

## 2023-03-31 DIAGNOSIS — I20.89 ANGINA AT REST: ICD-10-CM

## 2023-03-31 DIAGNOSIS — I50.9 CONGESTIVE HEART FAILURE, UNSPECIFIED HF CHRONICITY, UNSPECIFIED HEART FAILURE TYPE: ICD-10-CM

## 2023-03-31 DIAGNOSIS — I10 ESSENTIAL HYPERTENSION: Chronic | ICD-10-CM

## 2023-03-31 DIAGNOSIS — Z01.818 PRE-OPERATIVE EXAMINATION: ICD-10-CM

## 2023-03-31 DIAGNOSIS — I10 ESSENTIAL HYPERTENSION: ICD-10-CM

## 2023-03-31 LAB
AV INDEX (PROSTH): 0.79
AV MEAN GRADIENT: 8 MMHG
AV PEAK GRADIENT: 14 MMHG
AV VALVE AREA: 2.73 CM2
AV VELOCITY RATIO: 0.59
CV ECHO LV RWT: 0.42 CM
CV STRESS BASE HR: 62 BPM
DIASTOLIC BLOOD PRESSURE: 87 MMHG
DOP CALC AO PEAK VEL: 1.84 M/S
DOP CALC AO VTI: 35.1 CM
DOP CALC LVOT AREA: 3.5 CM2
DOP CALC LVOT DIAMETER: 2.1 CM
DOP CALC LVOT PEAK VEL: 1.08 M/S
DOP CALC LVOT STROKE VOLUME: 95.89 CM3
DOP CALCLVOT PEAK VEL VTI: 27.7 CM
E WAVE DECELERATION TIME: 237.71 MSEC
E/A RATIO: 0.75
E/E' RATIO: 9.5 M/S
ECHO LV POSTERIOR WALL: 1.07 CM (ref 0.6–1.1)
EJECTION FRACTION: 65 %
FRACTIONAL SHORTENING: 16 % (ref 28–44)
INTERVENTRICULAR SEPTUM: 1.21 CM (ref 0.6–1.1)
IVC DIAMETER: 1.98 CM
LA MAJOR: 7.69 CM
LA MINOR: 6.51 CM
LA WIDTH: 4.8 CM
LEFT ATRIUM SIZE: 4.12 CM
LEFT ATRIUM VOLUME: 118.52 CM3
LEFT INTERNAL DIMENSION IN SYSTOLE: 4.23 CM (ref 2.1–4)
LEFT VENTRICLE DIASTOLIC VOLUME: 121.41 ML
LEFT VENTRICLE SYSTOLIC VOLUME: 79.78 ML
LEFT VENTRICULAR INTERNAL DIMENSION IN DIASTOLE: 5.06 CM (ref 3.5–6)
LEFT VENTRICULAR MASS: 221.82 G
LV LATERAL E/E' RATIO: 7.92 M/S
LV SEPTAL E/E' RATIO: 11.88 M/S
LVOT MG: 2.52 MMHG
LVOT MV: 0.77 CM/S
MV PEAK A VEL: 1.27 M/S
MV PEAK E VEL: 0.95 M/S
NUC STRESS DIASTOLIC VOLUME INDEX: 115
NUC STRESS EJECTION FRACTION: 52 %
NUC STRESS SYSTOLIC VOLUME INDEX: 55
OHS CV CPX 85 PERCENT MAX PREDICTED HEART RATE MALE: 132
OHS CV CPX MAX PREDICTED HEART RATE: 155
OHS CV CPX PATIENT IS FEMALE: 0
OHS CV CPX PATIENT IS MALE: 1
OHS CV CPX PEAK DIASTOLIC BLOOD PRESSURE: 75 MMHG
OHS CV CPX PEAK HEAR RATE: 90 BPM
OHS CV CPX PEAK RATE PRESSURE PRODUCT: NORMAL
OHS CV CPX PEAK SYSTOLIC BLOOD PRESSURE: 164 MMHG
OHS CV CPX PERCENT MAX PREDICTED HEART RATE ACHIEVED: 58
OHS CV CPX RATE PRESSURE PRODUCT PRESENTING: 9734
PV PEAK VELOCITY: 0.99 CM/S
RA MAJOR: 5.94 CM
RA PRESSURE: 8 MMHG
RA WIDTH: 5.1 CM
RIGHT VENTRICULAR END-DIASTOLIC DIMENSION: 4.15 CM
SINUS: 3.9 CM
STJ: 2.76 CM
SYSTOLIC BLOOD PRESSURE: 157 MMHG
TDI LATERAL: 0.12 M/S
TDI SEPTAL: 0.08 M/S
TDI: 0.1 M/S
TRICUSPID ANNULAR PLANE SYSTOLIC EXCURSION: 2.5 CM

## 2023-03-31 PROCEDURE — 93306 ECHO (CUPID ONLY): ICD-10-PCS | Mod: 26,HCNC,, | Performed by: INTERNAL MEDICINE

## 2023-03-31 PROCEDURE — 78452 HT MUSCLE IMAGE SPECT MULT: CPT | Mod: 26,HCNC,, | Performed by: INTERNAL MEDICINE

## 2023-03-31 PROCEDURE — 63600175 PHARM REV CODE 636 W HCPCS: Mod: HCNC | Performed by: INTERNAL MEDICINE

## 2023-03-31 PROCEDURE — 93306 TTE W/DOPPLER COMPLETE: CPT | Mod: HCNC

## 2023-03-31 PROCEDURE — 93016 NUCLEAR STRESS - CARDIOLOGY INTERPRETED (CUPID ONLY): ICD-10-PCS | Mod: HCNC,,, | Performed by: INTERNAL MEDICINE

## 2023-03-31 PROCEDURE — 93018 NUCLEAR STRESS - CARDIOLOGY INTERPRETED (CUPID ONLY): ICD-10-PCS | Mod: HCNC,,, | Performed by: INTERNAL MEDICINE

## 2023-03-31 PROCEDURE — 93306 TTE W/DOPPLER COMPLETE: CPT | Mod: 26,HCNC,, | Performed by: INTERNAL MEDICINE

## 2023-03-31 PROCEDURE — 78452 HT MUSCLE IMAGE SPECT MULT: CPT | Mod: HCNC

## 2023-03-31 PROCEDURE — A9502 TC99M TETROFOSMIN: HCPCS | Mod: HCNC

## 2023-03-31 PROCEDURE — 93018 CV STRESS TEST I&R ONLY: CPT | Mod: HCNC,,, | Performed by: INTERNAL MEDICINE

## 2023-03-31 PROCEDURE — 93016 CV STRESS TEST SUPVJ ONLY: CPT | Mod: HCNC,,, | Performed by: INTERNAL MEDICINE

## 2023-03-31 PROCEDURE — 93017 CV STRESS TEST TRACING ONLY: CPT | Mod: HCNC

## 2023-03-31 PROCEDURE — 78452 NUCLEAR STRESS - CARDIOLOGY INTERPRETED (CUPID ONLY): ICD-10-PCS | Mod: 26,HCNC,, | Performed by: INTERNAL MEDICINE

## 2023-03-31 RX ORDER — REGADENOSON 0.08 MG/ML
0.4 INJECTION, SOLUTION INTRAVENOUS ONCE
Status: COMPLETED | OUTPATIENT
Start: 2023-03-31 | End: 2023-03-31

## 2023-03-31 RX ADMIN — REGADENOSON 0.4 MG: 0.08 INJECTION, SOLUTION INTRAVENOUS at 08:03

## 2023-04-03 ENCOUNTER — LAB VISIT (OUTPATIENT)
Dept: LAB | Facility: HOSPITAL | Age: 68
End: 2023-04-03
Attending: ORTHOPAEDIC SURGERY
Payer: MEDICARE

## 2023-04-03 DIAGNOSIS — Z01.818 PREOP TESTING: ICD-10-CM

## 2023-04-03 LAB
ABO + RH BLD: NORMAL
BLD GP AB SCN CELLS X3 SERPL QL: NORMAL
SPECIMEN OUTDATE: NORMAL

## 2023-04-03 PROCEDURE — 36415 COLL VENOUS BLD VENIPUNCTURE: CPT | Mod: HCNC | Performed by: ORTHOPAEDIC SURGERY

## 2023-04-03 PROCEDURE — 86900 BLOOD TYPING SEROLOGIC ABO: CPT | Mod: HCNC | Performed by: ORTHOPAEDIC SURGERY

## 2023-04-04 ENCOUNTER — OFFICE VISIT (OUTPATIENT)
Dept: CARDIOLOGY | Facility: CLINIC | Age: 68
End: 2023-04-04
Payer: MEDICARE

## 2023-04-04 VITALS
DIASTOLIC BLOOD PRESSURE: 78 MMHG | RESPIRATION RATE: 18 BRPM | OXYGEN SATURATION: 96 % | BODY MASS INDEX: 34.76 KG/M2 | HEART RATE: 66 BPM | HEIGHT: 69 IN | WEIGHT: 234.69 LBS | SYSTOLIC BLOOD PRESSURE: 136 MMHG

## 2023-04-04 DIAGNOSIS — E11.9 CONTROLLED TYPE 2 DIABETES MELLITUS WITHOUT COMPLICATION, WITHOUT LONG-TERM CURRENT USE OF INSULIN: ICD-10-CM

## 2023-04-04 DIAGNOSIS — I20.89 ANGINA AT REST: ICD-10-CM

## 2023-04-04 DIAGNOSIS — I70.0 ATHEROSCLEROSIS OF AORTA: ICD-10-CM

## 2023-04-04 DIAGNOSIS — I10 ESSENTIAL HYPERTENSION: Primary | Chronic | ICD-10-CM

## 2023-04-04 DIAGNOSIS — R07.89 CHEST PAIN, ATYPICAL: ICD-10-CM

## 2023-04-04 DIAGNOSIS — I50.9 CONGESTIVE HEART FAILURE, UNSPECIFIED HF CHRONICITY, UNSPECIFIED HEART FAILURE TYPE: ICD-10-CM

## 2023-04-04 DIAGNOSIS — I42.9 CARDIOMYOPATHY, UNSPECIFIED TYPE: ICD-10-CM

## 2023-04-04 PROBLEM — M17.12 ARTHRITIS OF LEFT KNEE: Status: ACTIVE | Noted: 2023-04-04

## 2023-04-04 PROCEDURE — 3078F PR MOST RECENT DIASTOLIC BLOOD PRESSURE < 80 MM HG: ICD-10-PCS | Mod: HCNC,CPTII,S$GLB, | Performed by: INTERNAL MEDICINE

## 2023-04-04 PROCEDURE — 1101F PR PT FALLS ASSESS DOC 0-1 FALLS W/OUT INJ PAST YR: ICD-10-PCS | Mod: HCNC,CPTII,S$GLB, | Performed by: INTERNAL MEDICINE

## 2023-04-04 PROCEDURE — 1101F PT FALLS ASSESS-DOCD LE1/YR: CPT | Mod: HCNC,CPTII,S$GLB, | Performed by: INTERNAL MEDICINE

## 2023-04-04 PROCEDURE — 4010F PR ACE/ARB THEARPY RXD/TAKEN: ICD-10-PCS | Mod: HCNC,CPTII,S$GLB, | Performed by: INTERNAL MEDICINE

## 2023-04-04 PROCEDURE — 3044F PR MOST RECENT HEMOGLOBIN A1C LEVEL <7.0%: ICD-10-PCS | Mod: HCNC,CPTII,S$GLB, | Performed by: INTERNAL MEDICINE

## 2023-04-04 PROCEDURE — 1125F AMNT PAIN NOTED PAIN PRSNT: CPT | Mod: HCNC,CPTII,S$GLB, | Performed by: INTERNAL MEDICINE

## 2023-04-04 PROCEDURE — 3075F PR MOST RECENT SYSTOLIC BLOOD PRESS GE 130-139MM HG: ICD-10-PCS | Mod: HCNC,CPTII,S$GLB, | Performed by: INTERNAL MEDICINE

## 2023-04-04 PROCEDURE — 3008F BODY MASS INDEX DOCD: CPT | Mod: HCNC,CPTII,S$GLB, | Performed by: INTERNAL MEDICINE

## 2023-04-04 PROCEDURE — 3078F DIAST BP <80 MM HG: CPT | Mod: HCNC,CPTII,S$GLB, | Performed by: INTERNAL MEDICINE

## 2023-04-04 PROCEDURE — 4010F ACE/ARB THERAPY RXD/TAKEN: CPT | Mod: HCNC,CPTII,S$GLB, | Performed by: INTERNAL MEDICINE

## 2023-04-04 PROCEDURE — 1159F MED LIST DOCD IN RCRD: CPT | Mod: HCNC,CPTII,S$GLB, | Performed by: INTERNAL MEDICINE

## 2023-04-04 PROCEDURE — 3075F SYST BP GE 130 - 139MM HG: CPT | Mod: HCNC,CPTII,S$GLB, | Performed by: INTERNAL MEDICINE

## 2023-04-04 PROCEDURE — 99999 PR PBB SHADOW E&M-EST. PATIENT-LVL IV: CPT | Mod: PBBFAC,HCNC,, | Performed by: INTERNAL MEDICINE

## 2023-04-04 PROCEDURE — 3288F PR FALLS RISK ASSESSMENT DOCUMENTED: ICD-10-PCS | Mod: HCNC,CPTII,S$GLB, | Performed by: INTERNAL MEDICINE

## 2023-04-04 PROCEDURE — 99214 PR OFFICE/OUTPT VISIT, EST, LEVL IV, 30-39 MIN: ICD-10-PCS | Mod: HCNC,S$GLB,, | Performed by: INTERNAL MEDICINE

## 2023-04-04 PROCEDURE — 99999 PR PBB SHADOW E&M-EST. PATIENT-LVL IV: ICD-10-PCS | Mod: PBBFAC,HCNC,, | Performed by: INTERNAL MEDICINE

## 2023-04-04 PROCEDURE — 99214 OFFICE O/P EST MOD 30 MIN: CPT | Mod: HCNC,S$GLB,, | Performed by: INTERNAL MEDICINE

## 2023-04-04 PROCEDURE — 3008F PR BODY MASS INDEX (BMI) DOCUMENTED: ICD-10-PCS | Mod: HCNC,CPTII,S$GLB, | Performed by: INTERNAL MEDICINE

## 2023-04-04 PROCEDURE — 3044F HG A1C LEVEL LT 7.0%: CPT | Mod: HCNC,CPTII,S$GLB, | Performed by: INTERNAL MEDICINE

## 2023-04-04 PROCEDURE — 1159F PR MEDICATION LIST DOCUMENTED IN MEDICAL RECORD: ICD-10-PCS | Mod: HCNC,CPTII,S$GLB, | Performed by: INTERNAL MEDICINE

## 2023-04-04 PROCEDURE — 3288F FALL RISK ASSESSMENT DOCD: CPT | Mod: HCNC,CPTII,S$GLB, | Performed by: INTERNAL MEDICINE

## 2023-04-04 PROCEDURE — 1125F PR PAIN SEVERITY QUANTIFIED, PAIN PRESENT: ICD-10-PCS | Mod: HCNC,CPTII,S$GLB, | Performed by: INTERNAL MEDICINE

## 2023-04-04 NOTE — PROGRESS NOTES
Subjective:   Patient ID:  Eyad Rodriguez is a 67 y.o. male who presents for follow-up of No chief complaint on file.      HPI    HTN, diastolic CHF, DM     Stress test 3/31/23   Normal myocardial perfusion scan. There is no evidence of myocardial ischemia or infarction.    There is a mild to moderate intensity fixed perfusion abnormality in the inferior wall of the left ventricle secondary to diaphragm attenuation.    The gated perfusion images showed an ejection fraction of 52% post stress.    There is normal wall motion post stress.     Echo 3/31/23  The left ventricle is normal in size with mild eccentric hypertrophy and normal systolic function.   The estimated ejection fraction is 65%.   Normal right ventricular size with normal right ventricular systolic function.   The sinuses of Valsalva is mildly dilated, 3.9cm.     EKG 3/22/23 sinus bradycardia 49 PRWP     3/28/23 Needs clearance prior to knee surgery. Mild MONTGOMERY and occasional CP. Activity limited  BP controlled  Echo and lexiscan myoview for CP, MONTGOMERY, Hx CHF   Will clear for surgery at moderate cardiac risk if ok     4/4/23 Denies CP or SOB  BP controlled    Review of Systems   Constitutional: Negative for decreased appetite.   HENT:  Negative for ear discharge.    Eyes:  Negative for blurred vision.   Endocrine: Negative for polyphagia.   Skin:  Negative for nail changes.   Genitourinary:  Negative for bladder incontinence.   Neurological:  Negative for aphonia.   Psychiatric/Behavioral:  Negative for hallucinations.    Allergic/Immunologic: Negative for hives.     Objective:   Physical Exam  Constitutional:       Appearance: He is well-developed.   HENT:      Head: Normocephalic and atraumatic.   Eyes:      Conjunctiva/sclera: Conjunctivae normal.      Pupils: Pupils are equal, round, and reactive to light.   Cardiovascular:      Rate and Rhythm: Normal rate.      Pulses: Intact distal pulses.      Heart sounds: Normal heart sounds.   Pulmonary:       Effort: Pulmonary effort is normal.      Breath sounds: Normal breath sounds.   Abdominal:      General: Bowel sounds are normal.      Palpations: Abdomen is soft.   Musculoskeletal:         General: Normal range of motion.      Cervical back: Normal range of motion and neck supple.   Skin:     General: Skin is warm and dry.   Neurological:      Mental Status: He is alert and oriented to person, place, and time.       Assessment:      1. Essential hypertension    2. Atherosclerosis of aorta    3. Cardiomyopathy, unspecified type    4. Congestive heart failure, unspecified HF chronicity, unspecified heart failure type    5. Angina at rest    6. Controlled type 2 diabetes mellitus without complication, without long-term current use of insulin    7. Chest pain, atypical        Plan:     Cleared for knee surgery at moderate cardiac risk  Continue Rx for HTN, diastolic CHF, DM  OV 1 year

## 2023-04-05 ENCOUNTER — ANESTHESIA (OUTPATIENT)
Dept: SURGERY | Facility: HOSPITAL | Age: 68
End: 2023-04-05
Payer: MEDICARE

## 2023-04-05 ENCOUNTER — HOSPITAL ENCOUNTER (OUTPATIENT)
Facility: HOSPITAL | Age: 68
Discharge: HOME HEALTH | End: 2023-04-05
Attending: ORTHOPAEDIC SURGERY | Admitting: ORTHOPAEDIC SURGERY
Payer: MEDICARE

## 2023-04-05 VITALS
OXYGEN SATURATION: 96 % | DIASTOLIC BLOOD PRESSURE: 77 MMHG | HEART RATE: 58 BPM | RESPIRATION RATE: 18 BRPM | TEMPERATURE: 98 F | SYSTOLIC BLOOD PRESSURE: 156 MMHG

## 2023-04-05 DIAGNOSIS — M17.12 ARTHRITIS OF LEFT KNEE: Primary | ICD-10-CM

## 2023-04-05 LAB
POCT GLUCOSE: 122 MG/DL (ref 70–110)
POCT GLUCOSE: 164 MG/DL (ref 70–110)

## 2023-04-05 PROCEDURE — 27447 TOTAL KNEE ARTHROPLASTY: CPT | Mod: HCNC,LT,, | Performed by: ORTHOPAEDIC SURGERY

## 2023-04-05 PROCEDURE — C1776 JOINT DEVICE (IMPLANTABLE): HCPCS | Mod: HCNC | Performed by: ORTHOPAEDIC SURGERY

## 2023-04-05 PROCEDURE — 71000016 HC POSTOP RECOV ADDL HR: Mod: HCNC | Performed by: ORTHOPAEDIC SURGERY

## 2023-04-05 PROCEDURE — 82962 GLUCOSE BLOOD TEST: CPT | Mod: HCNC | Performed by: ORTHOPAEDIC SURGERY

## 2023-04-05 PROCEDURE — 63600175 PHARM REV CODE 636 W HCPCS: Mod: HCNC | Performed by: ANESTHESIOLOGY

## 2023-04-05 PROCEDURE — 0055T PR COMPUTER-ASSIST MUSCSKEL NAVIG, ORTHO PROC, CT/MRI: ICD-10-PCS | Mod: HCNC,,, | Performed by: ORTHOPAEDIC SURGERY

## 2023-04-05 PROCEDURE — 27201423 OPTIME MED/SURG SUP & DEVICES STERILE SUPPLY: Mod: HCNC | Performed by: ORTHOPAEDIC SURGERY

## 2023-04-05 PROCEDURE — 36000710: Mod: HCNC | Performed by: ORTHOPAEDIC SURGERY

## 2023-04-05 PROCEDURE — 97161 PT EVAL LOW COMPLEX 20 MIN: CPT | Mod: HCNC

## 2023-04-05 PROCEDURE — D9220A PRA ANESTHESIA: ICD-10-PCS | Mod: CRNA,,, | Performed by: NURSE ANESTHETIST, CERTIFIED REGISTERED

## 2023-04-05 PROCEDURE — 63600175 PHARM REV CODE 636 W HCPCS: Mod: HCNC | Performed by: ORTHOPAEDIC SURGERY

## 2023-04-05 PROCEDURE — 37000009 HC ANESTHESIA EA ADD 15 MINS: Mod: HCNC | Performed by: ORTHOPAEDIC SURGERY

## 2023-04-05 PROCEDURE — 36000711: Mod: HCNC | Performed by: ORTHOPAEDIC SURGERY

## 2023-04-05 PROCEDURE — D9220A PRA ANESTHESIA: Mod: CRNA,,, | Performed by: NURSE ANESTHETIST, CERTIFIED REGISTERED

## 2023-04-05 PROCEDURE — 97110 THERAPEUTIC EXERCISES: CPT | Mod: HCNC

## 2023-04-05 PROCEDURE — 64447 ADDUCTOR SS: ICD-10-PCS | Mod: 59,LT,, | Performed by: STUDENT IN AN ORGANIZED HEALTH CARE EDUCATION/TRAINING PROGRAM

## 2023-04-05 PROCEDURE — 71000015 HC POSTOP RECOV 1ST HR: Mod: HCNC | Performed by: ORTHOPAEDIC SURGERY

## 2023-04-05 PROCEDURE — 0055T BONE SRGRY CMPTR CT/MRI IMAG: CPT | Mod: HCNC,,, | Performed by: ORTHOPAEDIC SURGERY

## 2023-04-05 PROCEDURE — D9220A PRA ANESTHESIA: Mod: ANES,,, | Performed by: ANESTHESIOLOGY

## 2023-04-05 PROCEDURE — 97535 SELF CARE MNGMENT TRAINING: CPT | Mod: HCNC

## 2023-04-05 PROCEDURE — 25000003 PHARM REV CODE 250: Mod: HCNC | Performed by: ORTHOPAEDIC SURGERY

## 2023-04-05 PROCEDURE — 27447 PR TOTAL KNEE ARTHROPLASTY: ICD-10-PCS | Mod: HCNC,LT,, | Performed by: ORTHOPAEDIC SURGERY

## 2023-04-05 PROCEDURE — 63600175 PHARM REV CODE 636 W HCPCS: Mod: HCNC | Performed by: NURSE ANESTHETIST, CERTIFIED REGISTERED

## 2023-04-05 PROCEDURE — 97165 OT EVAL LOW COMPLEX 30 MIN: CPT | Mod: HCNC

## 2023-04-05 PROCEDURE — 71000039 HC RECOVERY, EACH ADD'L HOUR: Mod: HCNC | Performed by: ORTHOPAEDIC SURGERY

## 2023-04-05 PROCEDURE — 64447 NJX AA&/STRD FEMORAL NRV IMG: CPT | Mod: HCNC | Performed by: STUDENT IN AN ORGANIZED HEALTH CARE EDUCATION/TRAINING PROGRAM

## 2023-04-05 PROCEDURE — 25000003 PHARM REV CODE 250: Mod: HCNC | Performed by: ANESTHESIOLOGY

## 2023-04-05 PROCEDURE — 37000008 HC ANESTHESIA 1ST 15 MINUTES: Mod: HCNC | Performed by: ORTHOPAEDIC SURGERY

## 2023-04-05 PROCEDURE — D9220A PRA ANESTHESIA: ICD-10-PCS | Mod: ANES,,, | Performed by: ANESTHESIOLOGY

## 2023-04-05 PROCEDURE — 71000033 HC RECOVERY, INTIAL HOUR: Mod: HCNC | Performed by: ORTHOPAEDIC SURGERY

## 2023-04-05 PROCEDURE — 25000003 PHARM REV CODE 250: Mod: HCNC | Performed by: NURSE ANESTHETIST, CERTIFIED REGISTERED

## 2023-04-05 DEVICE — COMP FEM CR BEAD SZ 4 LEFT: Type: IMPLANTABLE DEVICE | Site: KNEE | Status: FUNCTIONAL

## 2023-04-05 DEVICE — PATELLA TRIATHLON 32X10MM ASYM: Type: IMPLANTABLE DEVICE | Site: KNEE | Status: FUNCTIONAL

## 2023-04-05 DEVICE — INSERT TIBIAL CS TRI SZ5 11MM: Type: IMPLANTABLE DEVICE | Site: KNEE | Status: FUNCTIONAL

## 2023-04-05 DEVICE — BASEPLATE TIBIAL TRIATHLON SZ: Type: IMPLANTABLE DEVICE | Site: KNEE | Status: FUNCTIONAL

## 2023-04-05 RX ORDER — LIDOCAINE HYDROCHLORIDE 10 MG/ML
1 INJECTION, SOLUTION EPIDURAL; INFILTRATION; INTRACAUDAL; PERINEURAL
Status: DISCONTINUED | OUTPATIENT
Start: 2023-04-05 | End: 2023-04-05 | Stop reason: HOSPADM

## 2023-04-05 RX ORDER — ROPIVACAINE HYDROCHLORIDE 5 MG/ML
INJECTION, SOLUTION EPIDURAL; INFILTRATION; PERINEURAL
Status: COMPLETED | OUTPATIENT
Start: 2023-04-05 | End: 2023-04-05

## 2023-04-05 RX ORDER — DOCUSATE SODIUM 100 MG/1
100 CAPSULE, LIQUID FILLED ORAL 2 TIMES DAILY
Qty: 30 CAPSULE | Refills: 0 | Status: SHIPPED | OUTPATIENT
Start: 2023-04-05 | End: 2023-08-29

## 2023-04-05 RX ORDER — LIDOCAINE HYDROCHLORIDE 10 MG/ML
1 INJECTION, SOLUTION EPIDURAL; INFILTRATION; INTRACAUDAL; PERINEURAL ONCE
Status: DISCONTINUED | OUTPATIENT
Start: 2023-04-05 | End: 2023-04-05 | Stop reason: SDUPTHER

## 2023-04-05 RX ORDER — OXYCODONE HYDROCHLORIDE 5 MG/1
5-10 TABLET ORAL EVERY 4 HOURS PRN
Qty: 40 TABLET | Refills: 0 | Status: SHIPPED | OUTPATIENT
Start: 2023-04-05 | End: 2023-04-10 | Stop reason: SDUPTHER

## 2023-04-05 RX ORDER — MUPIROCIN 20 MG/G
1 OINTMENT TOPICAL
Status: COMPLETED | OUTPATIENT
Start: 2023-04-05 | End: 2023-04-05

## 2023-04-05 RX ORDER — FAMOTIDINE 20 MG/1
20 TABLET, FILM COATED ORAL 2 TIMES DAILY
Status: DISCONTINUED | OUTPATIENT
Start: 2023-04-05 | End: 2023-04-05 | Stop reason: HOSPADM

## 2023-04-05 RX ORDER — METHOCARBAMOL 500 MG/1
500 TABLET, FILM COATED ORAL 4 TIMES DAILY
Qty: 40 TABLET | Refills: 0 | Status: SHIPPED | OUTPATIENT
Start: 2023-04-05 | End: 2023-04-15

## 2023-04-05 RX ORDER — OXYCODONE HYDROCHLORIDE 5 MG/1
10 TABLET ORAL
Status: DISCONTINUED | OUTPATIENT
Start: 2023-04-05 | End: 2023-04-05 | Stop reason: HOSPADM

## 2023-04-05 RX ORDER — ACETAMINOPHEN 500 MG
1000 TABLET ORAL
Status: COMPLETED | OUTPATIENT
Start: 2023-04-05 | End: 2023-04-05

## 2023-04-05 RX ORDER — POLYETHYLENE GLYCOL 3350 17 G/17G
17 POWDER, FOR SOLUTION ORAL DAILY
Status: DISCONTINUED | OUTPATIENT
Start: 2023-04-05 | End: 2023-04-05 | Stop reason: HOSPADM

## 2023-04-05 RX ORDER — PROPOFOL 10 MG/ML
VIAL (ML) INTRAVENOUS
Status: DISCONTINUED | OUTPATIENT
Start: 2023-04-05 | End: 2023-04-05

## 2023-04-05 RX ORDER — TRANEXAMIC ACID 10 MG/ML
1000 INJECTION, SOLUTION INTRAVENOUS
Status: COMPLETED | OUTPATIENT
Start: 2023-04-05 | End: 2023-04-05

## 2023-04-05 RX ORDER — ASPIRIN 81 MG/1
81 TABLET ORAL 2 TIMES DAILY
Qty: 60 TABLET | Refills: 0 | Status: SHIPPED | OUTPATIENT
Start: 2023-04-05 | End: 2023-05-05

## 2023-04-05 RX ORDER — HYDROMORPHONE HYDROCHLORIDE 2 MG/ML
0.2 INJECTION, SOLUTION INTRAMUSCULAR; INTRAVENOUS; SUBCUTANEOUS EVERY 5 MIN PRN
Status: DISCONTINUED | OUTPATIENT
Start: 2023-04-05 | End: 2023-04-05 | Stop reason: HOSPADM

## 2023-04-05 RX ORDER — ROPIVACAINE/EPI/CLONIDINE/KET 2.46-0.005
SYRINGE (ML) INJECTION ONCE
Status: DISCONTINUED | OUTPATIENT
Start: 2023-04-05 | End: 2023-04-05 | Stop reason: HOSPADM

## 2023-04-05 RX ORDER — PROCHLORPERAZINE EDISYLATE 5 MG/ML
5 INJECTION INTRAMUSCULAR; INTRAVENOUS EVERY 6 HOURS PRN
Status: DISCONTINUED | OUTPATIENT
Start: 2023-04-05 | End: 2023-04-05 | Stop reason: HOSPADM

## 2023-04-05 RX ORDER — MIDAZOLAM HYDROCHLORIDE 1 MG/ML
INJECTION, SOLUTION INTRAMUSCULAR; INTRAVENOUS
Status: DISCONTINUED | OUTPATIENT
Start: 2023-04-05 | End: 2023-04-05

## 2023-04-05 RX ORDER — CEFAZOLIN SODIUM 2 G/50ML
2 SOLUTION INTRAVENOUS
Status: DISCONTINUED | OUTPATIENT
Start: 2023-04-05 | End: 2023-04-05 | Stop reason: HOSPADM

## 2023-04-05 RX ORDER — SODIUM CHLORIDE 9 MG/ML
INJECTION, SOLUTION INTRAVENOUS
Status: DISCONTINUED | OUTPATIENT
Start: 2023-04-05 | End: 2023-04-05 | Stop reason: HOSPADM

## 2023-04-05 RX ORDER — CEFAZOLIN SODIUM 2 G/50ML
2 SOLUTION INTRAVENOUS
Status: COMPLETED | OUTPATIENT
Start: 2023-04-05 | End: 2023-04-05

## 2023-04-05 RX ORDER — ACETAMINOPHEN 500 MG
1000 TABLET ORAL EVERY 6 HOURS
Status: DISCONTINUED | OUTPATIENT
Start: 2023-04-05 | End: 2023-04-05 | Stop reason: HOSPADM

## 2023-04-05 RX ORDER — CELECOXIB 100 MG/1
400 CAPSULE ORAL ONCE
Status: COMPLETED | OUTPATIENT
Start: 2023-04-05 | End: 2023-04-05

## 2023-04-05 RX ORDER — METHOCARBAMOL 750 MG/1
750 TABLET, FILM COATED ORAL 3 TIMES DAILY
Status: DISCONTINUED | OUTPATIENT
Start: 2023-04-05 | End: 2023-04-05 | Stop reason: HOSPADM

## 2023-04-05 RX ORDER — SODIUM CHLORIDE 0.9 % (FLUSH) 0.9 %
10 SYRINGE (ML) INJECTION
Status: DISCONTINUED | OUTPATIENT
Start: 2023-04-05 | End: 2023-04-05 | Stop reason: HOSPADM

## 2023-04-05 RX ORDER — ACETAMINOPHEN 500 MG
1000 TABLET ORAL EVERY 8 HOURS PRN
Qty: 42 TABLET | Refills: 0 | Status: SHIPPED | OUTPATIENT
Start: 2023-04-05

## 2023-04-05 RX ORDER — PREGABALIN 75 MG/1
75 CAPSULE ORAL 2 TIMES DAILY
Qty: 28 CAPSULE | Refills: 0 | Status: SHIPPED | OUTPATIENT
Start: 2023-04-05 | End: 2023-08-29

## 2023-04-05 RX ORDER — TRANEXAMIC ACID 10 MG/ML
1000 INJECTION, SOLUTION INTRAVENOUS
Status: DISCONTINUED | OUTPATIENT
Start: 2023-04-05 | End: 2023-04-05 | Stop reason: HOSPADM

## 2023-04-05 RX ORDER — ONDANSETRON 2 MG/ML
4 INJECTION INTRAMUSCULAR; INTRAVENOUS EVERY 8 HOURS PRN
Status: DISCONTINUED | OUTPATIENT
Start: 2023-04-05 | End: 2023-04-05 | Stop reason: HOSPADM

## 2023-04-05 RX ORDER — NALOXONE HCL 0.4 MG/ML
0.02 VIAL (ML) INJECTION
Status: DISCONTINUED | OUTPATIENT
Start: 2023-04-05 | End: 2023-04-05 | Stop reason: HOSPADM

## 2023-04-05 RX ORDER — FENTANYL CITRATE 50 UG/ML
INJECTION, SOLUTION INTRAMUSCULAR; INTRAVENOUS
Status: DISCONTINUED | OUTPATIENT
Start: 2023-04-05 | End: 2023-04-05

## 2023-04-05 RX ORDER — AMOXICILLIN 250 MG
1 CAPSULE ORAL 2 TIMES DAILY
Status: DISCONTINUED | OUTPATIENT
Start: 2023-04-05 | End: 2023-04-05 | Stop reason: HOSPADM

## 2023-04-05 RX ORDER — ASPIRIN 81 MG/1
81 TABLET ORAL 2 TIMES DAILY
Status: DISCONTINUED | OUTPATIENT
Start: 2023-04-05 | End: 2023-04-05 | Stop reason: HOSPADM

## 2023-04-05 RX ORDER — OXYCODONE HYDROCHLORIDE 5 MG/1
5 TABLET ORAL
Status: DISCONTINUED | OUTPATIENT
Start: 2023-04-05 | End: 2023-04-05 | Stop reason: HOSPADM

## 2023-04-05 RX ORDER — ONDANSETRON 2 MG/ML
INJECTION INTRAMUSCULAR; INTRAVENOUS
Status: DISCONTINUED | OUTPATIENT
Start: 2023-04-05 | End: 2023-04-05

## 2023-04-05 RX ORDER — LIDOCAINE HYDROCHLORIDE 20 MG/ML
INJECTION INTRAVENOUS
Status: DISCONTINUED | OUTPATIENT
Start: 2023-04-05 | End: 2023-04-05

## 2023-04-05 RX ORDER — ACETAMINOPHEN 500 MG
1000 TABLET ORAL
Status: DISCONTINUED | OUTPATIENT
Start: 2023-04-05 | End: 2023-04-05 | Stop reason: SDUPTHER

## 2023-04-05 RX ORDER — ROPIVACAINE/EPI/CLONIDINE/KET 2.46-0.005
SYRINGE (ML) INJECTION
Status: DISCONTINUED | OUTPATIENT
Start: 2023-04-05 | End: 2023-04-05 | Stop reason: HOSPADM

## 2023-04-05 RX ORDER — CELECOXIB 200 MG/1
200 CAPSULE ORAL 2 TIMES DAILY
Qty: 14 CAPSULE | Refills: 0 | Status: SHIPPED | OUTPATIENT
Start: 2023-04-05 | End: 2023-08-29

## 2023-04-05 RX ORDER — PROPOFOL 10 MG/ML
VIAL (ML) INTRAVENOUS CONTINUOUS PRN
Status: DISCONTINUED | OUTPATIENT
Start: 2023-04-05 | End: 2023-04-05

## 2023-04-05 RX ORDER — BUPIVACAINE HYDROCHLORIDE 5 MG/ML
INJECTION, SOLUTION EPIDURAL; INTRACAUDAL
Status: COMPLETED | OUTPATIENT
Start: 2023-04-05 | End: 2023-04-05

## 2023-04-05 RX ORDER — SODIUM CHLORIDE, SODIUM LACTATE, POTASSIUM CHLORIDE, CALCIUM CHLORIDE 600; 310; 30; 20 MG/100ML; MG/100ML; MG/100ML; MG/100ML
INJECTION, SOLUTION INTRAVENOUS CONTINUOUS
Status: DISCONTINUED | OUTPATIENT
Start: 2023-04-05 | End: 2023-04-05 | Stop reason: HOSPADM

## 2023-04-05 RX ORDER — SODIUM CHLORIDE 9 MG/ML
INJECTION, SOLUTION INTRAVENOUS CONTINUOUS
Status: DISCONTINUED | OUTPATIENT
Start: 2023-04-05 | End: 2023-04-05 | Stop reason: HOSPADM

## 2023-04-05 RX ADMIN — TRANEXAMIC ACID 1000 MG: 10 INJECTION, SOLUTION INTRAVENOUS at 08:04

## 2023-04-05 RX ADMIN — SENNOSIDES AND DOCUSATE SODIUM 1 TABLET: 50; 8.6 TABLET ORAL at 10:04

## 2023-04-05 RX ADMIN — FENTANYL CITRATE 50 MCG: 0.05 INJECTION, SOLUTION INTRAMUSCULAR; INTRAVENOUS at 07:04

## 2023-04-05 RX ADMIN — ASPIRIN 81 MG: 81 TABLET, COATED ORAL at 10:04

## 2023-04-05 RX ADMIN — TRANEXAMIC ACID 1000 MG: 10 INJECTION, SOLUTION INTRAVENOUS at 07:04

## 2023-04-05 RX ADMIN — MIDAZOLAM HYDROCHLORIDE 2 MG: 1 INJECTION, SOLUTION INTRAMUSCULAR; INTRAVENOUS at 07:04

## 2023-04-05 RX ADMIN — PROPOFOL 100 MCG/KG/MIN: 10 INJECTION, EMULSION INTRAVENOUS at 07:04

## 2023-04-05 RX ADMIN — ONDANSETRON 4 MG: 2 INJECTION, SOLUTION INTRAMUSCULAR; INTRAVENOUS at 09:04

## 2023-04-05 RX ADMIN — MUPIROCIN 1 G: 20 OINTMENT TOPICAL at 06:04

## 2023-04-05 RX ADMIN — BUPIVACAINE HYDROCHLORIDE 2 ML: 5 INJECTION, SOLUTION EPIDURAL; INTRACAUDAL; PERINEURAL at 07:04

## 2023-04-05 RX ADMIN — METHOCARBAMOL 750 MG: 750 TABLET ORAL at 10:04

## 2023-04-05 RX ADMIN — FENTANYL CITRATE 50 MCG: 0.05 INJECTION, SOLUTION INTRAMUSCULAR; INTRAVENOUS at 09:04

## 2023-04-05 RX ADMIN — CEFAZOLIN SODIUM 2 G: 2 SOLUTION INTRAVENOUS at 07:04

## 2023-04-05 RX ADMIN — FAMOTIDINE 20 MG: 20 TABLET, FILM COATED ORAL at 10:04

## 2023-04-05 RX ADMIN — PROPOFOL 50 MG: 10 INJECTION, EMULSION INTRAVENOUS at 07:04

## 2023-04-05 RX ADMIN — ROPIVACAINE HYDROCHLORIDE 20 ML: 5 INJECTION, SOLUTION EPIDURAL; INFILTRATION; PERINEURAL at 07:04

## 2023-04-05 RX ADMIN — ACETAMINOPHEN 1000 MG: 500 TABLET, FILM COATED ORAL at 06:04

## 2023-04-05 RX ADMIN — LIDOCAINE HYDROCHLORIDE 80 MG: 20 INJECTION, SOLUTION INTRAVENOUS at 07:04

## 2023-04-05 RX ADMIN — CELECOXIB 400 MG: 100 CAPSULE ORAL at 06:04

## 2023-04-05 RX ADMIN — PREGABALIN 75 MG: 25 CAPSULE ORAL at 06:04

## 2023-04-05 RX ADMIN — SODIUM CHLORIDE, SODIUM LACTATE, POTASSIUM CHLORIDE, AND CALCIUM CHLORIDE: .6; .31; .03; .02 INJECTION, SOLUTION INTRAVENOUS at 07:04

## 2023-04-05 NOTE — OP NOTE
OPERATIVE NOTE     PATIENT NAME: Eyad Rodriguez   MRN: [id number]      Surgery Date:4/5/23    Surgeon(s) and Assistant(s): Shane Arreola MD. ART [facility]     Procedure(s): left Primary Total Knee Arthroplasty     BMI:  There is no height or weight on file to calculate BMI.      Anesthesia:  Spinal     Attestation:   I was present for all of the critical portions of the operation and performed all critical portions.  The medical assistant performed the superficial closure under supervision, and assisted during the operation. I was immediately available for the duration of the entire case.      Preoperative Diagnosis:  left  Knee Arthritis     Postoperative Diagnosis: Same     Findings:  See Full Operative Report    Complications: None     EBL: 25cc    Implants:   Implant Name Type Inv. Item Serial No.  Lot No. LRB No. Used Action   COMP FEM CR BEAD SZ 4 LEFT - [id number]  COMP FEM CR BEAD SZ 4 LEFT  [employer].  Left 1 Implanted   BASEPLATE TIBIAL TRIATHLON SZ - [id number]  BASEPLATE TIBIAL TRIATHLON SZ  [employer].  Left 1 Implanted   INSERT TIBIAL CS TRI SZ5 11MM - [id number]  INSERT TIBIAL CS TRI SZ5 11MM  [employer].  Left 1 Implanted   PATELLA TRIATHLON [id number] ASYM - [id number]  PATELLA TRIATHLON [id number] ASYM  [employer].  Left 1 Implanted       Drains: None     Problem List:   Problem List Items Addressed This Visit          Orthopedic    Arthritis of left knee - Primary    Relevant Orders    Vital signs    Perform Suero Agitation Sedation Scale (RASS)    Insert peripheral IV    Clip and Prep Left Knee    FOOT COMPRESSION PUMP    Place foot compression device    Document RASS    Chlorhexidine (CHG) 2% Wipes    Ortho Pathway Patient    Notify Physician - Potential Need of Opioid Reversal    Notify Anesthesiologist if patient on home insulin pump    Vital signs - notify MD    Diet NPO    Monitor EtCO2    Oxygen Continuous    PT evaluate and  treat    OT evaluate and treat    Activity as tolerated    Keep surgical extremity elevated when not ambulating (Completed)    Lifting restrictions    Weight bearing as tolerated    No driving, operating heavy equipment or signing legal documents while taking pain medication    On POD1 remove ace wrap and cotton padding. Leave Aquacel bandage on until 2  week post op visit in clinic    Call MD for:  temperature >100.4    Call MD for:  persistent nausea and vomiting    Call MD for:  severe uncontrolled pain    Call MD for:  difficulty breathing, headache or visual disturbances    Call MD for:  redness, tenderness, or signs of infection (pain, swelling, redness, odor or green/yellow discharge around incision site)    Call MD for:  hives    Call MD for:  persistent dizziness or light-headedness    Call MD for:  extreme fatigue    WALKER FOR HOME USE    3 IN 1 COMMODE FOR HOME USE    TRANSFER TUB BENCH FOR HOME USE    Place YIFAN hose    X-Ray Knee 1 or 2 View Left          OPERATIVE INDICATIONS: The patient has a long history of progressive left knee pain, arthritis, and degeneration. They has developed deformity in the knee from predominantly wear and bone loss. Non-operative treatment and injections have been attempted, but have not improved or controlled the symptoms and pain that occurs during normal daily activities. Knee motion has also become limited and is restricting the patient. A total knee arthroplasty was recommended. The risks, benefits and potential complications of the arthroplasty surgery were discussed with the patient in detail. Specific details of the surgical procedure, hospitalization, recovery, rehabilitation, and long-term precautions were also presented. Pre-operative teaching was provided. Implant/prosthesis selection was outlined, and the many options available were explained; the final choice will be made at the time of the procedure to match the anatomy and condition of the bone, ligaments,  tendons, and muscles.      The patient was seen by Internal Medicine/Anesthesia for pre-operative optimization, and risk assessment. Maryanne-operative blood management and the potential for blood transfusion were discussed with risks and options clearly outlined. We discussed the risks of the procedure in detail, which included but is not limited to infection, bleeding, scarring, injury to blood vessels, nerves, muscular structures. We discussed specifically fracture and arthrofibrotic complications. Understanding of all topics was conveyed to me by the patient pre-operatively, and patient consent was given to proceed with the total knee replacement.      OPERATIVE PROCEDURE:  The patient was identified and brought into the Operating Room by the anesthesia and nursing teams. Anesthesia was successfully performed with spinal. The patient was then positioned supine on the operating room table, and all bony prominences were padded.  Intravenous antibiotic prophylaxis with Cefazolin dosing was confirmed. A tourniquet was applied to the upper thigh. A full knee exam was done after anesthesia was in full effect.  The leg was prepped and draped in the usual sterile fashion.  A surgical time-out was performed immediately preceding the incision with all personnel in the operating room; the patient identity was again confirmed, the knee-surgical site and extremity were identified and confirmed, X-rays were reviewed, and availability of the appropriate surgical equipment was established. The knee was exsanguinated and exposed using a limited anterior-midline skin incision. 1g of TXA was administed. Dissection was carried down through skin and subcutaneous tissue to the extensor mechanism with a scalpel.  A median parapatellar arthrotomy was made to enter the knee space sharply. A large amount of normal appearing joint fluid was encountered and suctioned.  The synovium was thickened, hypertrophic, and inflamed. A partial  synovectomy was performed for exposure, and the medial and lateral gutters were cleared of scar and synovial reflections. The superficial medial collateral ligament was carefully elevated off. The patellar synovial reflections were released and the patella exposed to reveal wear through the articular surface. The trochlea demonstrated similar wear. Attention was then turned to the patella, it was measured and the posterior 9-10 mm was resected leaving a healthy remnant with greater than 11mm thickness.  The patella was then subluxed laterally. The femoral and tibial arrays were placed as well as the checkpoints.     The knee was then flexed up to 90 degrees.  Assessment of the knee joint revealed severe end-stage articular damage with no remaining medial/lateral weight bearing cartilage.  The compartment was severely eburnated with bone loss on the posterior tibia and posterior femoral condyles, resulting in the varus deformity.  The anterior cruciate ligament was found to be functionally compromised and it was excised.  Assessment of the soft tissues were made utilizing 1-6 mm feeler gauges in flexion and extension.  Changes to the preoperative plan were then carried out.     Reassessment of the soft tissue balance was carried out to confirm equal tension in flexion, extension, and medial/lateral balance. The robotic arm was then utilized to execute the plan. We began with the distal femur and chamfer cut. Then blade was swtiched and the remaining femur cuts were excuted. Then the tibia was cut. Soft tissue structures were protected with Z-retractors, jodiet. Osteophytes were then further debrided from the femur and the tibia. Degenerative meniscal remnants were excised medially and laterally. We cleaned up posterior osteophytes with curved osteotome and currette. A portion of the pain cocktail was injected into the posterior capsule. Trailing was then initated. Femur trial was placed, then the knee was then  brought to extension and the appropriate sized tibial spacer block was placed. This brought the knee to full extension, mid flexion, and 130 degrees of flexion with excellent medial lateral balance. The tray was rotated so that its midpoint was at the junction of middle one-third and lateral two-thirds of the tibial tubercle. Punches and pins fixed the trial in this position. Check points and arrays were removed. The patella was sized with the asymmetric guide, and drill holes were made. A trial button was placed and tracking of the patella and the entire knee trial was excellent; range of motion was excellent. No instability.  Patella tracked midline. The PCL was intact and function, with a solid endoint to posterior drawer testing     Punches and drills were then placed through the trials to accommodate the final implants.  All trials were removed.  The wound was copiously lavaged with a pulse irrigation/suction system. This included a solution of dilute betadine. The posterior recess of the knee and areas of known bleeding were treated with the electrocautery to reduce post-operative bleeding. The cut bone surfaces were then irrigated again, suctioned, and dried. The final implants were placed, tibia followed by femur followed by patella. The final CS polyethylene was impacted into place. This was a press-fit design. The tourniquet was released and no excessive bleeding was encountered. Synovial bleeding was further treated with the electrocautery.  The wound was again irrigated. The extensor mechanism and capsule was then anatomically closed with Stratafix suture.  Knee stability and range of motion with the capsule closed was excellent, and range of motion was 0 to 130 without excessive stress on the repair. Instrument and sponge count was completed and confirmed correct. Deep and superficial subcutaneous tissue was closed with interrupted 2-0 Vicryl suture. A running 4-0 Monocryl subcuticular stitch was used  to re-approximate the skin edges. Dermabond adhesive was applied.  A sterile silver impregnated dressing was placed.  PAS stockings were placed.  The patient tolerated the procedure, was transferred from the operating room table to a hospital bed, and taken to Recovery/PACU in stable condition.     PAIN COCKTAIL:ANGIE Formula     POST OPERATIVE PLAN:  Patient will be transferred to the floor once in stable condition and after radiographs confirm no immediate complications. The patient will be treated with multimodal pain management protocols. They will be placed on anticoagulation of ASA 81mg to start on POD1. The patient will be weight bearing as tolerated. They will work with physical therapy, have a graduated diet, and once medially stable and safe for home can be discharged. Patient will follow up with me 3 weeks post operatively. Dressing should be removed by patient 7 days post operatively.    IMPLANTS:  Aniyah Triathlon  Femur: #4 CR, pressfit  Tibia: #5, pressfit  Poly: 11mm CS  Patella: 32mm, pressfit

## 2023-04-05 NOTE — PROGRESS NOTES
Referral for [facility]CS sent via [facility] to [facility] for review.  TN indicated that the plan is for the pt to d/c home in 1-2 days.  TN to follow for response

## 2023-04-05 NOTE — ANESTHESIA POSTPROCEDURE EVALUATION
Anesthesia Post Evaluation    Patient: Eyad Rodriguez    Procedure(s) Performed: Procedure(s) (LRB):  ARTHROPLASTY, KNEE, TOTAL. HUGO (Left)    Final Anesthesia Type: spinal      Patient location during evaluation: PACU  Patient participation: Yes- Able to Participate  Level of consciousness: awake and alert  Post-procedure vital signs: reviewed and stable  Pain management: adequate  Airway patency: patent  ROSALINDA mitigation strategies: Multimodal analgesia and Use of major conduction anesthesia (spinal/epidural) or peripheral nerve block  PONV status at discharge: No PONV  Anesthetic complications: no      Cardiovascular status: blood pressure returned to baseline  Respiratory status: unassisted and spontaneous ventilation  Hydration status: euvolemic  Follow-up not needed.          Vitals Value Taken Time   /69 04/05/23 1047   Temp 36.7 °C (98 °F) 04/05/23 0924   Pulse 48 04/05/23 1050   Resp 19 04/05/23 1050   SpO2 90 % 04/05/23 1050   Vitals shown include unvalidated device data.      No case tracking events are documented in the log.      Pain/Xuan Score: Pain Rating Prior to Med Admin: 10 (4/5/2023  6:01 AM)  Xuan Score: 9 (4/5/2023 10:30 AM)

## 2023-04-05 NOTE — PT/OT/SLP EVAL
"Physical Therapy Evaluation    Patient Name:  Eyad Rodriguez   MRN:  [id number]    Recommendations:     Discharge Recommendations: home health PT (And Neighbor support when goals are adequately met for safe D/C home)   Discharge Equipment Recommendations: bedside commode, walker, rolling   Barriers to discharge: Inaccessible home, Decreased caregiver support, and pt is at risk for falls.       Assessment:     Eyad Rodriguez is a 67 y.o. male admitted with a medical diagnosis of <principal problem not specified>.  He presents with the following impairments/functional limitations: weakness, gait instability, impaired balance, decreased lower extremity function, impaired coordination, decreased ROM, decreased safety awareness, impaired self care skills, pain, impaired skin, orthopedic precautions, impaired functional mobility, decreased coordination Poor insight into safety and limitations.    Rehab Prognosis: Good; patient would benefit from acute skilled PT services to address these deficits and reach maximum level of function.    Recent Surgery: Procedure(s) (LRB):  ARTHROPLASTY, KNEE, TOTAL. HUGO (Left) Day of Surgery    Plan:     During this hospitalization, patient to be seen daily to address the identified rehab impairments via gait training, therapeutic activities, therapeutic exercises and progress toward the following goals:    Plan of Care Expires:  04/07/23    Subjective     Chief Complaint: "burning" then increased pain at end of session  Patient/Family Comments/goals: to go home  Pain/Comfort:  Pain Rating 1:  ("burning")  Location - Orientation 1:  (L knee)  Pain Addressed 1: Reposition, Distraction, Cessation of Activity, Nurse notified  Pain Rating Post-Intervention 1:  (not rated,"I am starting to feel a little more pain now")    Patients cultural, spiritual, Religion conflicts given the current situation: no    Living Environment:  Pt lives alone in a [facility] with 3STE, no HR  Prior to admission, " "patients level of function was Independent with ambulation and ADL's, driving.  Equipment used at home: None .  DME owned (not currently used): none.  Upon discharge, patient will have assistance from Neighbor?.    Objective:     Communicated with nsg prior to session.  Patient found HOB elevated with blood pressure cuff, pulse ox (continuous), telemetry, peripheral IV, SCD, FCD, cryotherapy  upon PT entry to room.    General Precautions: Standard, fall, diabetic  Orthopedic Precautions:LLE weight bearing as tolerated   Braces: N/A  Respiratory Status: Room air    Exams:  Cognitive Exam:  Patient is oriented to Person, Place, Time, and Situation  Gross Motor Coordination:  mildly impaired 2/2 weakness, deconditioning, and body habitus  Postural Exam:  Patient presented with the following abnormalities:    -       Rounded shoulders  -       Forward head  Sensation:    -       Intact  light/touch B LE's  Skin Integrity/Edema:      -       Skin integrity: L knee with SX dressing intact  -       Edema: None noted    RLE ROM: WFL  RLE Strength: WFL  LLE ROM: knee approx 5-95*  LLE Strength: Dflx 5/5, knee ext 3-/5    Functional Mobility:  Bed Mobility:     Scooting: stand by assistance  Supine to Sit: contact guard assistance  Sit to Supine: stand by assistance  Transfers:     Sit to Stand:  contact guard assistance and with VC/TC for hand placement with rolling walker  Gait: Gait training with RW and CGA/SBA with Max VC/TC for sequencing, walker management/safety, and distribution of weight through UE's to walker to offload L LE approx 25' and 75'  Balance: Fair+ sit, fair/fair stand      AM-PAC 6 CLICK MOBILITY  Total Score:18       Treatment & Education:  Alecia protocol:  HR 59, /89, SPO2 on RA 92%  Reviewed B LE TE per TKA handout provided at "[facility]" with pt who verbalized/demonstrated understanding of: AP's, and FAQ's in sitting and AP's, QS, and GS in reclined position with Vc/TC to perform correctly.  "      Patient left HOB elevated with all lines intact, call button in reach, and nsg notified.    GOALS:   Multidisciplinary Problems       Physical Therapy Goals          Problem: Physical Therapy    Goal Priority Disciplines Outcome Goal Variances Interventions   Physical Therapy Goal     PT, PT/OT Ongoing, Progressing     Description: Goals to be met by:      Patient will increase functional independence with mobility by performin. Sit to stand transfer with Modified [place]  2. Gait  x 150' feet with Modified [place] using Rolling Walker.   3. Ascend/descend 3 stair Minimal Assistance .   4. Lower extremity exercise program x10 reps per handout, with independence                         History:     Past Medical History:   Diagnosis Date    Angina pectoris     Anxiety     BPH (benign prostatic hypertrophy)     Chronic low back pain     followed by pain management    Disorder of kidney and ureter     DJD (degenerative joint disease) of knee     Heart failure     Hemorrhoid     HTN (hypertension)     Insomnia     Obesity     Personal history of kidney stones     Tobacco abuse     Type 2 diabetes mellitus        Past Surgical History:   Procedure Laterality Date    cyst removed from back         Time Tracking:     PT Received On: 23  PT Start Time: 1128     PT Stop Time: 1152  PT Total Time (min): 24 min     Billable Minutes: Evaluation 8, TE 8 Co-evaluation with OT      2023

## 2023-04-05 NOTE — ANESTHESIA PROCEDURE NOTES
Spinal    Diagnosis: OA  Patient location during procedure: OR  Start time: 4/5/2023 7:48 AM  Timeout: 4/5/2023 7:48 AM  End time: 4/5/2023 7:48 AM    Staffing  Authorizing Provider: Rafaela Estrella MD  Performing Provider: Prabhakar Lin MD    Preanesthetic Checklist  Completed: patient identified, IV checked, site marked, risks and benefits discussed, surgical consent, monitors and equipment checked, pre-op evaluation and timeout performed  Spinal Block  Patient position: sitting  Prep: ChloraPrep  Patient monitoring: heart rate, cardiac monitor, continuous pulse ox and frequent blood pressure checks  Approach: midline  Location: L3-4  Injection technique: single shot  CSF Fluid: clear free-flowing CSF  Needle  Needle type: pencil-tip   Needle gauge: 24 G  Needle length: 4 in  Additional Documentation: no paresthesia on injection  Needle localization: anatomical landmarks  Assessment  Patient's tolerance of the procedure: comfortable throughout block and no complaints  Medications:    Medications: bupivacaine (pf) (MARCAINE) injection 0.5% - Intraspinal   2 mL - 4/5/2023 7:35:00 AM

## 2023-04-05 NOTE — PLAN OF CARE
04/05/23 1330   Final Note   Assessment Type Final Discharge Note   Anticipated Discharge Disposition Home-Health   Hospital Resources/Appts/Education Provided Appointments scheduled and added to AVS;Post-Acute resouces added to AVS   Post-Acute Status   Post-Acute Authorization Home Health;HME   HME Status Set-up Complete/Auth obtained   Home Health Status Set-up Complete/Auth obtained

## 2023-04-05 NOTE — DISCHARGE SUMMARY
"[facility] [place] [facility]  Discharge Note  Short Stay    Procedure(s) (LRB):  ARTHROPLASTY, KNEE, TOTAL. HUGO (Left)      OUTCOME: Patient tolerated treatment/procedure well without complication and is now ready for discharge.    DISPOSITION: Home-Health Care [facility]    FINAL DIAGNOSIS:  Left Knee OA    FOLLOWUP: In clinic    DISCHARGE INSTRUCTIONS:    Discharge Procedure Orders   WALKER FOR HOME USE     Order Specific Question Answer Comments   Type of Walker: Adult (5'4"-6'6")    With wheels? Yes    Height: 5'9    Weight: 235    Length of need (1-99 months): 99    Please check all that apply: Walker will be used for gait training.      3 IN 1 COMMODE FOR HOME USE     Order Specific Question Answer Comments   Type: Standard    Height: 5'9    Weight: 235    Length of need (1-99 months): 99      TRANSFER TUB BENCH FOR HOME USE     Order Specific Question Answer Comments   Type of Transfer Tub Bench: Unpadded    Height: 5'9    Weight: 235    Length of need (1-99 months): 99      Activity as tolerated     Keep surgical extremity elevated when not ambulating     Lifting restrictions   Order Comments: No strenuous exercise or lifting of > 10 lbs     Weight bearing as tolerated     No driving, operating heavy equipment or signing legal documents while taking pain medication     On POD1 remove ace wrap and cotton padding. Leave Aquacel bandage on until 2  week post op visit in clinic     Call MD for:  temperature >100.4     Call MD for:  persistent nausea and vomiting     Call MD for:  severe uncontrolled pain     Call MD for:  difficulty breathing, headache or visual disturbances     Call MD for:  redness, tenderness, or signs of infection (pain, swelling, redness, odor or green/yellow discharge around incision site)     Call MD for:  hives     Call MD for:  persistent dizziness or light-headedness     Call MD for:  extreme fatigue        TIME SPENT ON DISCHARGE: 20 minutes  "

## 2023-04-05 NOTE — ANESTHESIA PROCEDURE NOTES
Adductor SS    Patient location during procedure: pre-op   Block not for primary anesthetic.  Reason for block: at surgeon's request and post-op pain management   Post-op Pain Location: left knee pain   Start time: 4/5/2023 7:05 AM  Timeout: 4/5/2023 7:05 AM   End time: 4/5/2023 7:05 AM    Staffing  Authorizing Provider: Rafaela Estrella MD  Performing Provider: Prabhakar Lin MD    Preanesthetic Checklist  Completed: patient identified, IV checked, site marked, risks and benefits discussed, surgical consent, monitors and equipment checked, pre-op evaluation and timeout performed  Peripheral Block  Patient position: supine  Prep: ChloraPrep  Patient monitoring: heart rate, cardiac monitor, continuous pulse ox, continuous capnometry and frequent blood pressure checks  Block type: adductor canal  Laterality: left  Injection technique: single shot  Needle  Needle type: Stimuplex   Needle gauge: 21 G  Needle length: 4 in  Needle localization: anatomical landmarks and ultrasound guidance   -ultrasound image captured on disc.  Assessment  Injection assessment: negative aspiration, negative parasthesia and local visualized surrounding nerve  Paresthesia pain: none  Heart rate change: no  Slow fractionated injection: yes    Medications:    Medications: ropivacaine (NAROPIN) injection 0.5% - Perineural   20 mL - 4/5/2023 7:05:00 AM    Additional Notes  VSS.  [facility] RN monitoring vitals throughout procedure.  Patient tolerated procedure well.

## 2023-04-05 NOTE — CONSULTS
Follow-up Information       [facility] Follow up.    Specialty: Home Health Services  Why: Home Health- call for questions or concerns regarding services  Contact information:  [address] [facility].  [address]  [facility] [address]  [phone]               [facility] Follow up.    Why: DME- call number provided for questions or concerns regarding equipment  Contact information:  [address] [place] [id number]  [phone]

## 2023-04-05 NOTE — PT/OT/SLP EVAL
"Occupational Therapy Evaluation and Treatment    Name: Eyad Rodriguez  MRN: [id number]  Admitting Diagnosis: <principal problem not specified> Day of Surgery  Recent Surgery: Procedure(s) (LRB):  ARTHROPLASTY, KNEE, TOTAL. HUGO (Left) Day of Surgery    Recommendations:     Discharge Recommendations: home health OT (with caregiver support)  Level of Assistance Recommended: Intermittent assistance  Discharge Equipment Recommendations: bedside commode, walker, rolling  Barriers to discharge: Decreased caregiver support    Assessment:     Eyad Rodriguez is a 67 y.o. male post op day #0 L TKA. He presents with performance deficits affecting function including weakness, decreased ROM, orthopedic precautions, impaired self care skills, impaired skin, decreased lower extremity function, impaired functional mobility, edema, impaired balance.     Rehab Prognosis: Good; patient would benefit from acute OT services to address these deficits and reach maximum level of function.    Plan:     Patient to be seen 3 x/week to address the above listed problems via self-care/home management, therapeutic activities, therapeutic exercises  Plan of Care Expires: 04/19/23  Plan of Care Reviewed with: patient    Subjective     Chief Complaint: needing to use the restroom   Patient Comments/Goals: agreeable to session     Pain/Comfort:  Pain Rating 1:  (c/o some "burning" to L knee)    Patients cultural, spiritual, Confucianist conflicts given the current situation: no    Social History:  Living Environment: Patient lives alone in a single story home with  3 MARYELLEN and 0 HR and another entry with 0 MARYELLEN. Bathroom set-up: tub/shower combo  Prior Level of Function: Prior to admission, patient was independent.  Roles and Routines: Patient was driving prior to admission. Loves to go fishing on his boat.  Equipment Used at Home: none  DME owned (not currently used): rollator  Assistance Upon Discharge:  neighbor; pt has no children    Objective: "     Communicated with PACU nurse, Chetna, prior to session. Patient found  head of stretcher elevated  with blood pressure cuff, pulse ox (continuous), telemetry, peripheral IV, SCD, FCD, cryotherapy upon OT entry to room.    General Precautions: Standard, fall, diabetic   Orthopedic Precautions: LLE weight bearing as tolerated   Braces: N/A    Respiratory Status: Room air    Vital signs:   Pre-tx: 62 bpm, 145/81, 95%  Seated EOB: 65 bpm, 167/89, 90-93%  Seated on commode after ambulatin%  Post-tx: 55 bpm, 152/73, 97%    Occupational Performance    Gait belt applied - Yes    Bed Mobility:   Supine to sit from right side of bed with stand by assistance  Sit to supine from the right side of the bed with SBA-CGA     Functional Mobility/Transfers:  Sit <> Stand Transfer with SBA with rolling walker with verbal cueing for safety d/t eager to get up without RW   Toilet Transfer Step Transfer technique with contact guard assistance with rolling walker  Functional Mobility: Gait belt donned prior to transfer for safety during mobility/transfers. Pt completed functional mobility within PACU and narrow bathroom with CGA-SBA using RW. Frequent cueing within narrow bathroom for safe body mechanics while turning, RW placement at the sink, and RW management.     Activities of Daily Living:  Grooming: stand by assistance  Upper Body Dressing: stand by assistance while standing at the sink to wash hands after toileting; pt declined other ADLs at the sink   Toileting: supervision seated on commode for pericare after urinating     Cognitive/Visual Perceptual:  Cognitive/Psychosocial Skills:    -     Follows Commands/attention: Follows multistep  commands  -     Communication: clear/fluent  -     Memory: No Deficits noted  -     Safety awareness/insight to disability: intact  -     Mood/Affect/Coping skills/emotional control: Cooperative and Pleasant  Visual/Perceptual:    -     Intact R/L discrimination    Physical  Exam:  Balance:    -     Sitting: supervision  -     Standing: stand by assistance with RW  Postural examination/scapula alignment:    -       Rounded shoulders  -       Forward head  Skin integrity: Visible skin intact and dressing intact to LLE  Edema:  no BUE edema noted   Sensation:    -       Intact  light/touch BUE  Dominant hand: Right  Upper Extremity Range of Motion:     -       Right Upper Extremity: WFL  -       Left Upper Extremity: WFL  Upper Extremity Strength:    -       Right Upper Extremity: WFL  -       Left Upper Extremity: WFL   Strength:    -       Right Upper Extremity: WFL  -       Left Upper Extremity: WFL  Fine Motor Coordination:    -       Intact  Left hand, manipulation of objects and Right hand, manipulation of objects  Gross motor coordination:   WFL    AMPAC 6 Click ADL:  AMPAC Total Score: 22    Treatment & Education:  Pt educated on OT role/POC.   All questions/concerns answered within OT scope of practice   Educated on the importance of mobility to maximize recovery  Educated on weight bearing status (LLE WBAT)  Patient is clear to ambulate to/from bathroom with RN/PCT, assist x1 for OOB mobility with RN  Edu on use and safety with polar ice: placing a towel/cloth in-between skin and ice pack to protect skin integrity; having neighbor assist with changing to ice/water  Edu on safe pacing of ADLs; use of long-handled sponge for showering for safety      Patient left  head of stretcher elevated with B/L railings up with cryotherapy in place, LLE FCD and RLE SCD in place  with all lines intact, call button in reach, RN notified, and all needs met/within reach .    GOALS:   Multidisciplinary Problems       Occupational Therapy Goals          Problem: Occupational Therapy    Goal Priority Disciplines Outcome Interventions   Occupational Therapy Goal     OT, PT/OT Ongoing, Progressing    Description: Goals to be met by: 04/19/23    Patient will increase functional independence with  ADLs by performing:    LE Dressing with Modified [place].  Grooming while standing at sink with Modified [place].  Toileting from toilet with Modified [place] for hygiene and clothing management.   Step transfer with Modified [place]  Toilet transfer to toilet with Modified [place].                         History:     Past Medical History:   Diagnosis Date    Angina pectoris     Anxiety     BPH (benign prostatic hypertrophy)     Chronic low back pain     followed by pain management    Disorder of kidney and ureter     DJD (degenerative joint disease) of knee     Heart failure     Hemorrhoid     HTN (hypertension)     Insomnia     Obesity     Personal history of kidney stones     Tobacco abuse     Type 2 diabetes mellitus          Past Surgical History:   Procedure Laterality Date    cyst removed from back         Time Tracking:     OT Date of Treatment: 04/05/23  OT Start Time: 1128  OT Stop Time: 1152  OT Total Time (min): 24 min    Billable Minutes: Evaluation 15 min, Self Care/Home Management 9 min (co-eval with PT)    4/5/2023

## 2023-04-05 NOTE — DISCHARGE INSTRUCTIONS
Post-Operative Discharge Instructions: Dr. Arreola    Physical Therapy  You will have home health/physical therapy working with you 2-3x a week for the first two weeks. After this, it will be necessary to begin formal outpatient physical therapy for an additional 2 weeks for hip replacements and about 4-6 weeks for knee replacements.   Knee replacements can get stiff and as such the post operative therapy is critical after a knee replacement. Range of motion exercises are not limited to therapy sessions and should be done every 1-2 hours on your own.   Hip replacements your therapy for the first month is mostly walking and gradually returning to activities as tolerated. However, you will have to wean off assistive devices and maintain hip precautions for 6 weeks post operatively.     Pain Management  Some degree of pain is normal and expected. You will improve gradually as your muscles become stronger and healing continues. This speed of recovery is different for every patient and you should not compare your recovery to another friend or relative.   You will be discharged with pain medications. You should wean off of these as tolerated by 4-6 weeks. but it is expected you will need them in the immediate post operative period and for therapy.   Pain medications can have common side effects of constipation which you should take a laxative, nausea which you should take medication with food, itching which can be alleviated with Benadryl, and confusion which you should stop taking pain medications and call our office if this occurs.   Be aware of your ingestion of Tylenol if your pain medication has this in it, you should not exceed 3000mg of Acetaminophen as this can damage your liver.   If you require a pain medication refill, you will need to contact our office at least 3 days in advance to prescription running out. Prescriptions cannot be called into a pharmacy. You will need to  a prescription in one of our  office locations depending on our clinic availability.     Swelling  You will have swelling of the post operative leg. Swelling may get worse with activity. It will likely get worse in the 2-3 days after discharge from the hospital. Typically swelling is correlated to pain. It can be helpful to elevate your extremity above the level of your heart and consistent use of ice. Elevating your extremity should not be done as to violate your hip precautions after a hip replacement, and no pillows should be placed under the knee after knee replacement surgery.   You will have compression stockings that should remain on for 3 weeks following surgery. They should only be removed for hygiene purposes. These will help with the swelling.       Anticoagulation  You will be placed on a blood thinner to prevent blood clots. You will need to take this as directed.        Wound Care  Your dressing should come off after 7 days. You may shower over this dressing but it should be covered or kept dry (will wrap or garbage bag). Once it is removed, you may leave the wound open to air as so long there is no drainage. You can continue to shower but NO scrubbing the incision, should pat dry. NO soaking the wound in a bathtub, hot tub, pool, ocean etc. for at least 6 weeks after surgery. Your incision will likely be glued on the skin and no sutures should need to be removed post operatively.  You can begin putting on Vitamin E based lotions on the wound around 6-8 weeks post operatively when there is no remaining scabbing of the incision.   If there is any drainage post operatively you should contact our office immediately    Antibiotics  You will need to be placed on prophylactic antibiotics prior to any dental procedure or cleanings, any colonoscopy, cystoscopy, prostate or bladder surgery, kidney surgery or endoscopy. This will avoid risk of subsequent infection of your replacement. We prefer Amoxicillin 2g one hour prior to procedure.  This can be given by our office or your dentist. Although controversial, we prefer lifetime dental prophylaxis.     Other Considerations  No anti-inflammatories should be used for 3 weeks following surgery  No driving for about 2-4 weeks following surgery on the left leg and about 4-6 weeks after surgery on the right leg. You will need to be off pain medications completely. You will need to be able to perform evasive driving maneuvers without hesitation.    You can fly about 2 weeks post-operatively. However, we may recommend alterative blood thinners if this will take place.   Return to work is patient specific. If a desk job, it may be 2-4 weeks for motivated individuals. Patients in strenuous or physical jobs may be out for 12 weeks.     Follow-up appointments  Your first post operative visit will be about 3 weeks after surgery. You will have x-rays at this appointment  Your second post operative visit will be about 3 months after surgery. This will be for a clinical check only unless a reason arises for an x-ray  Your third post operative visit will be about 1 year after surgery. X-rays are taken at this time.           You will be sent home with a polar care wrap as part of your care. It will be hooked up to an ice pump.  For the first 4 days , use the machine for 30 minutes on and then 30 minutes off. ( or  time and or number of days specified by your doctor)   . You may use it after day 5 to use as needed for pain control. Be very cautious when anesthesia has given you a block to numb your extremity to monitor the ice therapy to avoid thermal injury to your skin. ( Do not leave the machine on , turn it on and off  at the 30 minute intervals and use a towel or cloth under the wrap to protect the skin )      To fill the unit:         Unlock the handles and remove lid        Fill with cold water to the indicated line and then with ice to the indicated line        Replace the lid and lock         Keep unit  upright   To use the polar care:         Before turning on make sure the pad is connected         Plug in -- this will turn the unit on   Refilling:         UNPLUG unit to turn OFF         Disconnect the wrap by pushing down on the metal tab and gently pulling the connectors apart         Drain unit and refill          Reattach connector by pushing down on metal piece and pushing the connectors together   To reapply the wrap  follow directions given to you or follow the numbers on the wrap 1,2,3

## 2023-04-05 NOTE — PT/OT/SLP PROGRESS
Occupational Therapy      Patient Name:  Eyad Rodriguez   MRN:  [id number]    Patient not seen today secondary to: d/w PACU nursing; pt is pending results of post-op x-ray and full motor return. Will follow-up.     4/5/2023

## 2023-04-05 NOTE — PLAN OF CARE
Discharge Orders: Home Brian         Expected Discharge Date: POD1    Diagnoses:  Post-op knee replacement    Patient is homebound due to:   Pt requires home health services due to taxing effort to leave the home as a result of immobility from Post-op knee replacement    Weight Bearing Status:   full weight bearing: Progress to cane as able.  Set up for outpatient PT as soon as able after 2 weeks, once patient is MOD I with cane.    Physical Therapy:   3 times a week for 2 weeks (Call for further orders beyond 2 weeks)  - Ambulate with a rolling walker  - Progress to cane  - Instruct on ROM and strengthening of knee    Aide to provide assistance with personal care and  ADLs 2 times a week for 2 weeks    Wound Care:   Surgical dressing change: Starting on  post op day 7-10, dressing can be moved. Incision is glued. Protective dressing should be placed with island dressing or apply gauze and cover with tegaderm.  Pt may shower if surgical dressing is waterproof, once removed on POD7 can shower but protect surgical dressing from getting wet by using press and seal saranwrap or garbage bag. Removal and replacement of dressing after shower only needed if incision is suspected to have gotten wet during shower. No soaking in the tub or hot tub use for 6 weeks.    Cold therapy/Ice: Encouraged at least 20 minutes 2-3 times daily or more if desired.  Incision must be kept waterproof while icing.  Wear TEDS Bilateral Thigh High Stockings for 3 weeks. Gavin hose x 3 weeks. Ok to remove gavin hose 1-2 hours/day max if desired.       Contact:  Please contact [phone] with concerns.         BLOOD THINNER:    If sent home on Lovenox: 28 days post-op for TKR/HEENA  If sent home home on ASA: 81mg   BID x 4 weeks   If on Plavix, ok to stop hospital prescribed blood thinner and restart Plavix on POD5 after surgery    Once finished with prescribed blood thinner, patients can return to pre-surgical ASA dosage if they took ASA before  surgery.       Outpatient Therapy: [facility] is preference (to begin 2 weeks post op)  [address] [facility]  [place] [address]      DME:  - Per PT/OT recommendation

## 2023-04-05 NOTE — PLAN OF CARE
Problem: Physical Therapy  Goal: Physical Therapy Goal  Description: Goals to be met by: 4.     Patient will increase functional independence with mobility by performin. Sit to stand transfer with Modified [place]  2. Gait  x 150' feet with Modified [place] using Rolling Walker.   3. Ascend/descend 3 stair Minimal Assistance .   4. Lower extremity exercise program x10 reps per handout, with independence    Outcome: Ongoing, Progressing   Initial PT evaluation performed today.  Pt could benefit from daily skilled PT services in order to maximize function prior to D/C.  HHPT with Neighbor support recommended when Acute care goals are adequately met.  Pt OK for OOB to chair and ambulate to bathroom with RW and Nursing staff.

## 2023-04-05 NOTE — TRANSFER OF CARE
Anesthesia Transfer of Care Note    Patient: Eyad Rodriguez    Procedure(s) Performed: Procedure(s) (LRB):  ARTHROPLASTY, KNEE, TOTAL. HUGO (Left)    Patient location: PACU    Anesthesia Type: spinal and MAC    Transport from OR: Transported from OR on 2-3 L/min O2 by NC with adequate spontaneous ventilation    Post pain: adequate analgesia    Post assessment: no apparent anesthetic complications    Post vital signs: stable    Level of consciousness: awake, alert and oriented    Nausea/Vomiting: no nausea/vomiting    Complications: none    Transfer of care protocol was followed      Last vitals:   Visit Vitals  BP (!) 109/57 (BP Location: Left arm, Patient Position: Sitting)   Pulse (!) 56   Temp 36.7 °C (98 °F) (Oral)   Resp 19   SpO2 (!) 91%

## 2023-04-05 NOTE — OR NURSING
Patient connected to cardiac monitor, Dr. Estrella at bedside for timeout and block to left leg.  VSS, Tolerated well throughout

## 2023-04-05 NOTE — PLAN OF CARE
Problem: Occupational Therapy  Goal: Occupational Therapy Goal  Description: Goals to be met by: 04/19/23    Patient will increase functional independence with ADLs by performing:    LE Dressing with Modified [place].  Grooming while standing at sink with Modified [place].  Toileting from toilet with Modified [place] for hygiene and clothing management.   Step transfer with Modified [place]  Toilet transfer to toilet with Modified [place].    Outcome: Ongoing, Progressing     OT rec HHOT with RW and BSC at d/c in order to increase safety and independence with ADLs and all aspects of functional mobility.

## 2023-04-05 NOTE — H&P
EST PATIENT ORTHOPAEDIC: Knee    PRIMARY CARE PHYSICIAN: Nafisa Garcia MD   REFERRING PROVIDER: Shane Arreola MD  [address] [facility]  [place] [address]     ASSESSMENT & PLAN:    Impression:  Left Knee Severe Degenerative Osteoarthritis, Primary     Follow Up Plan: 3 weeks after surgery    Surgery:    Eyad Rodriguez has radiograph and physical exam evidence of the aforementioned impression and wishes to pursue surgery. This patient appears to have sufficient symptoms to warrant surgical intervention and is an appropriate candidate for left Primary Total Knee Arthroplasty as evidenced by months of unsuccessful non-operative treatment as outlined in the HPI below and progressive symptoms.    We had a lengthy discussion regarding the risk and benefit of surgery, the alternatives, limitations and personnel involved. These included but were not limited to infection, persistent pain, instability, nerve injury, blood clots, loosening and medical complications. We also discussed the pre-operative course, surgery itself and rehabilitation.    We discussed his smoking history and increased risk of complications associated with this. We discussed his social situation and help he needs to obtain as well as plans to get to rehab appointments. He would like same day discharge    We will plan for use of [place] press-fit CR components, with [facility] assistance.     Tentative Surgery Date: 4/5/23    The patient has been ordered:  [facility] CT    CONSULTS:   PCP  Anesthesia    ACTIVE PROBLEM LIST  Patient Active Problem List   Diagnosis    BPH (benign prostatic hypertrophy)    DJD (degenerative joint disease) of knee    Insomnia    Anxiety    Chronic low back pain    Tobacco abuse    Essential hypertension    Angina at rest    Chest pain, atypical    CHF (congestive heart failure)    Cardiomyopathy    Controlled type 2 diabetes mellitus without complication, without long-term current use of insulin    Atherosclerosis of aorta     Arthritis of left knee           SUBJECTIVE    CHIEF COMPLAINT: Knee Pain    HPI:   Eyad Rodriguez is a 67 y.o. male here for evaluation and management of left knee pain. There is not a specific incident that brought about this pain. he has had progressive problems with the knee(s) starting 10 years ago but is now progressing to interfere with activities which include: walking, functional household ADL's, enjoying hobbies, exercise, rising from a sitting position, standing for prolonged periods of time, getting in and out of a car, and climbing stairs     Currently the pain in the joint is rated at moderate with activity. The pain is constant and is located in the knee, at level of joint line, located medially, located anterior, and located posterior. The pain is described as aching, severe, sharp, stiffness, and throbbing. Relieving factors include rest, ice or heat, prescription medication, and repositioning.     There is associated Popping.     Eyad Rodriguez has no additional complaints.     2/28/23: Here for pre-surgical planning. Last A1C in August was 5.9. Loss 8 pounds since last visit.     PROGRESSIVE SYMPTOMS:  Pain impacting sleep  Pain worsened by weight bearing  Pain effecting living situation    FUNCTIONAL STATUS:   Climb a flight of stairs or walk up a hill     PREVIOUS TREATMENTS:  Medical: Steroid Injections, Biologic Injections, and Narcotics  Physical Therapy: Activities Modified   Previous Orthopaedic Surgery: None    REVIEW OF SYSTEMS:  PAIN ASSESSMENT:  See HPI.  MUSCULOSKELETAL: See HPI.  OTHER 10 point review of systems is negative except as stated in HPI above    PAST MEDICAL HISTORY   has a past medical history of Angina pectoris, Anxiety, BPH (benign prostatic hypertrophy), Chronic low back pain, Disorder of kidney and ureter, DJD (degenerative joint disease) of knee, Heart failure, Hemorrhoid, HTN (hypertension), Insomnia, Obesity, Personal history of kidney stones, Tobacco abuse,  "and Type 2 diabetes mellitus.     PAST SURGICAL HISTORY   has a past surgical history that includes cyst removed from back.     FAMILY HISTORY  family history includes Anxiety disorder in his father, mother, and sister; Colon polyps in his brother; Diverticulosis in his mother; Hypertension in his father and mother; Irritable bowel syndrome in his mother.     SOCIAL HISTORY   reports that he has quit smoking. His smoking use included cigars. He uses smokeless tobacco. He reports that he does not drink alcohol and does not use drugs.     ALLERGIES   Review of patient's allergies indicates:   Allergen Reactions    Cyclobenzaprine Hives and Other (See Comments)     Other reaction(s): Nausea        MEDICATIONS  No current facility-administered medications on file prior to encounter.     Current Outpatient Medications on File Prior to Encounter   Medication Sig Dispense Refill    amLODIPine (NORVASC) 10 MG tablet Take 1 tablet (10 mg total) by mouth once daily. 90 tablet 3    lisinopriL (PRINIVIL,ZESTRIL) 40 MG tablet Take 1 tablet (40 mg total) by mouth once daily. 90 tablet 1    metoprolol succinate (TOPROL-XL) 25 MG 24 hr tablet Take 1 tablet (25 mg total) by mouth once daily. 90 tablet 3    zolpidem (AMBIEN) 10 mg Tab Take 1 tablet (10 mg total) by mouth every evening. 30 tablet 2    alprostadil (MUSE) 250 MCG pellet 1 each (250 mcg total) by Transurethral route as needed for Erectile Dysfunction. use no more than 3 times per week 6 each 1    flash glucose scanning reader (FREESTYLE BRIAN 10 DAY READER) Misc 1 Units by Misc.(Non-Drug; Combo Route) route 3 (three) times daily with meals. 1 each 0    flash glucose sensor (FREESTYLE BRIAN 10 DAY SENSOR) Kit 1 Units by Misc.(Non-Drug; Combo Route) route 3 (three) times daily with meals. 1 kit 11    lancets [facility] To check BG 3 times daily, to use with insurance preferred meter 300 each 3    pen needle, diabetic 32 gauge x 5/32" Ndle 1 Units by Misc.(Non-Drug; Combo Route) " route every evening. 200 each 3    TRUE METRIX AIR GLUCOSE METER Misc use to test sugars THREE TIMES A DAY      TRUE METRIX GLUCOSE TEST STRIP Strp use to test blood sugars THREE TIMES A  strip 0    TRUEPLUS LANCETS 30 gauge Misc use to test sugars THREE TIMES A  each 2          PHYSICAL EXAM   oral temperature is 97.8 °F (36.6 °C). His blood pressure is 142/67 (abnormal) and his pulse is 42 (abnormal). His respiration is 17 and oxygen saturation is 93% (abnormal).   There is no height or weight on file to calculate BMI.      All other systems deferred.  GENERAL:  No acute distress  HABITUS: Obese  GAIT: Antalgic  SKIN: Normal     KNEE EXAM:    left:   Effusion: Small joint effusion  TTP: yes over Medial Joint Line   Varus deformity   yes crepitus with passive knee ROM  Passive ROM: Extension 0, Flexion 110  No pain with manipulation of patella  Stable to varus/valgus stress. No increased laxity to anterior/posterior drawer testing  negative Pamela's test  No pain with IR/ER rotation of the hip  5/5 strength in knee flexion and extension, ankle plantarflexion and dorsiflexion  Neurovascular Status: Sensation intact to light touch in Sural, Saphenous, SPN, DPN, Tibial nerve distribution  2+ pulse DP/PT, normal capillary refill, foot has normal warmth    DATA:  Diagnostic tests reviewed for today's visit:     4v of the knee reveal Severe degenerative changes of the Medial and Patellofemoral compartment. There is evidence of advanced osteoarthritis changes with Subchondral sclerosis, Osteophyte formation, and Joint space narrowing. The limb is in varus alignment. The patella is tracking midline.

## 2023-04-10 ENCOUNTER — TELEPHONE (OUTPATIENT)
Dept: ORTHOPEDICS | Facility: CLINIC | Age: 68
End: 2023-04-10
Payer: MEDICARE

## 2023-04-10 RX ORDER — OXYCODONE HYDROCHLORIDE 5 MG/1
5-10 TABLET ORAL EVERY 4 HOURS PRN
Qty: 40 TABLET | Refills: 0 | Status: SHIPPED | OUTPATIENT
Start: 2023-04-10 | End: 2023-04-14

## 2023-04-10 NOTE — TELEPHONE ENCOUNTER
called the patient today regarding his surgery with Dr. Shane Arreola. The patient had a left TKA on 4/5/23.  LVM--  The patient was informed that if they have any urgent issues with their bandage, medications or any other health concerns regarding their surgery to call the 24/7 Orthopedic Post-op Hot Line at [phone]. The patient was reminded that if they have any chest pain or shortness of breath to call 911 or go to the ER.

## 2023-04-12 DIAGNOSIS — Z96.652 STATUS POST LEFT KNEE REPLACEMENT: ICD-10-CM

## 2023-04-12 DIAGNOSIS — M17.12 PRIMARY OSTEOARTHRITIS OF LEFT KNEE: Primary | ICD-10-CM

## 2023-04-13 ENCOUNTER — PES CALL (OUTPATIENT)
Dept: ADMINISTRATIVE | Facility: CLINIC | Age: 68
End: 2023-04-13
Payer: MEDICARE

## 2023-04-18 ENCOUNTER — OFFICE VISIT (OUTPATIENT)
Dept: OPTOMETRY | Facility: CLINIC | Age: 68
End: 2023-04-18
Payer: MEDICARE

## 2023-04-18 ENCOUNTER — TELEPHONE (OUTPATIENT)
Dept: ORTHOPEDICS | Facility: CLINIC | Age: 68
End: 2023-04-18

## 2023-04-18 DIAGNOSIS — E11.36 TYPE 2 DIABETES MELLITUS WITH DIABETIC CATARACT, WITHOUT LONG-TERM CURRENT USE OF INSULIN: Primary | ICD-10-CM

## 2023-04-18 DIAGNOSIS — H52.7 REFRACTIVE ERROR: ICD-10-CM

## 2023-04-18 DIAGNOSIS — H25.13 NUCLEAR SCLEROSIS OF BOTH EYES: ICD-10-CM

## 2023-04-18 PROCEDURE — 4010F ACE/ARB THERAPY RXD/TAKEN: CPT | Mod: HCNC,CPTII,S$GLB, | Performed by: OPTOMETRIST

## 2023-04-18 PROCEDURE — 3044F PR MOST RECENT HEMOGLOBIN A1C LEVEL <7.0%: ICD-10-PCS | Mod: HCNC,CPTII,S$GLB, | Performed by: OPTOMETRIST

## 2023-04-18 PROCEDURE — 3044F HG A1C LEVEL LT 7.0%: CPT | Mod: HCNC,CPTII,S$GLB, | Performed by: OPTOMETRIST

## 2023-04-18 PROCEDURE — 92004 PR EYE EXAM, NEW PATIENT,COMPREHESV: ICD-10-PCS | Mod: HCNC,S$GLB,, | Performed by: OPTOMETRIST

## 2023-04-18 PROCEDURE — 99999 PR PBB SHADOW E&M-EST. PATIENT-LVL III: ICD-10-PCS | Mod: PBBFAC,HCNC,, | Performed by: OPTOMETRIST

## 2023-04-18 PROCEDURE — 92004 COMPRE OPH EXAM NEW PT 1/>: CPT | Mod: HCNC,S$GLB,, | Performed by: OPTOMETRIST

## 2023-04-18 PROCEDURE — 99999 PR PBB SHADOW E&M-EST. PATIENT-LVL III: CPT | Mod: PBBFAC,HCNC,, | Performed by: OPTOMETRIST

## 2023-04-18 PROCEDURE — 2023F PR DILATED RETINAL EXAM W/O EVID OF RETINOPATHY: ICD-10-PCS | Mod: HCNC,CPTII,S$GLB, | Performed by: OPTOMETRIST

## 2023-04-18 PROCEDURE — 1160F RVW MEDS BY RX/DR IN RCRD: CPT | Mod: HCNC,CPTII,S$GLB, | Performed by: OPTOMETRIST

## 2023-04-18 PROCEDURE — 1101F PT FALLS ASSESS-DOCD LE1/YR: CPT | Mod: HCNC,CPTII,S$GLB, | Performed by: OPTOMETRIST

## 2023-04-18 PROCEDURE — 1126F PR PAIN SEVERITY QUANTIFIED, NO PAIN PRESENT: ICD-10-PCS | Mod: HCNC,CPTII,S$GLB, | Performed by: OPTOMETRIST

## 2023-04-18 PROCEDURE — 1160F PR REVIEW ALL MEDS BY PRESCRIBER/CLIN PHARMACIST DOCUMENTED: ICD-10-PCS | Mod: HCNC,CPTII,S$GLB, | Performed by: OPTOMETRIST

## 2023-04-18 PROCEDURE — 92015 PR REFRACTION: ICD-10-PCS | Mod: HCNC,S$GLB,, | Performed by: OPTOMETRIST

## 2023-04-18 PROCEDURE — 1159F PR MEDICATION LIST DOCUMENTED IN MEDICAL RECORD: ICD-10-PCS | Mod: HCNC,CPTII,S$GLB, | Performed by: OPTOMETRIST

## 2023-04-18 PROCEDURE — 3288F PR FALLS RISK ASSESSMENT DOCUMENTED: ICD-10-PCS | Mod: HCNC,CPTII,S$GLB, | Performed by: OPTOMETRIST

## 2023-04-18 PROCEDURE — 1126F AMNT PAIN NOTED NONE PRSNT: CPT | Mod: HCNC,CPTII,S$GLB, | Performed by: OPTOMETRIST

## 2023-04-18 PROCEDURE — 92015 DETERMINE REFRACTIVE STATE: CPT | Mod: HCNC,S$GLB,, | Performed by: OPTOMETRIST

## 2023-04-18 PROCEDURE — 2023F DILAT RTA XM W/O RTNOPTHY: CPT | Mod: HCNC,CPTII,S$GLB, | Performed by: OPTOMETRIST

## 2023-04-18 PROCEDURE — 4010F PR ACE/ARB THEARPY RXD/TAKEN: ICD-10-PCS | Mod: HCNC,CPTII,S$GLB, | Performed by: OPTOMETRIST

## 2023-04-18 PROCEDURE — 3288F FALL RISK ASSESSMENT DOCD: CPT | Mod: HCNC,CPTII,S$GLB, | Performed by: OPTOMETRIST

## 2023-04-18 PROCEDURE — 1101F PR PT FALLS ASSESS DOC 0-1 FALLS W/OUT INJ PAST YR: ICD-10-PCS | Mod: HCNC,CPTII,S$GLB, | Performed by: OPTOMETRIST

## 2023-04-18 PROCEDURE — 1159F MED LIST DOCD IN RCRD: CPT | Mod: HCNC,CPTII,S$GLB, | Performed by: OPTOMETRIST

## 2023-04-18 NOTE — PROGRESS NOTES
Subjective:       Patient ID: Eyad Rodriguez is a 67 y.o. male      Chief Complaint   Patient presents with    Concerns About Ocular Health    Diabetic Eye Exam     History of Present Illness  Dls: 2-3 yrs johan best    66 y/o male presents today for diabetic eye exam.   Pt c/o blurry vision at distance and near ou off/on.  Pt wears single glasses for reading.      x 1 day     + ou tearing  No itching  No burning  No pain  No ha's  + ou floaters  + ou flashes    Eye meds  None    Hemoglobin A1C       Date                     Value               Ref Range             Status                03/22/2023               6.2 (H)             4.0 - 5.6 %           Final                  08/22/2022               5.9 (H)             4.0 - 5.6 %           Final                  02/28/2022               6.3 (H)             4.0 - 5.6 %           Final                   Assessment/Plan:     1. Type 2 diabetes mellitus with diabetic cataract, without long-term current use of insulin  No diabetic retinopathy. Discussed with pt the effects of diabetes on vision, importance of good blood sugar control, compliance with meds, and follow up care with PCP. Return in 1 year for dilated eye exam, sooner PRN.    2. Nuclear sclerosis of both eyes  Educated pt on presence of cataracts and effects on vision. No surgery at this time. Recheck in one year, sooner PRN.    3. Refractive error  Educated patient on refractive error and discussed lens options. Dispensed updated spectacle Rx. Educated about adaptation period to new specs.    Eyeglass Final Rx       Eyeglass Final Rx         Sphere Cylinder Axis Add    Right +0.75 +0.50 170 +2.50    Left +0.50 +0.50 125 +2.50      Expiration Date: 4/18/2024                      Follow up in about 1 year (around 4/18/2024) for Diabetic Eye Exam.

## 2023-04-18 NOTE — TELEPHONE ENCOUNTER
----- Message from Cierra Medley sent at 4/18/2023  4:36 PM CDT -----  Type: Patient Call Back    Who called: Home Health     What is the request in detail: pt will be ready for OP PT next week     Can the clinic reply by [facility]?    Would the patient rather a call back or a response via [facility]?     Best call back number: [phone]    Additional Information:

## 2023-04-19 DIAGNOSIS — M17.12 PRIMARY OSTEOARTHRITIS OF LEFT KNEE: Primary | ICD-10-CM

## 2023-04-19 DIAGNOSIS — Z96.652 STATUS POST LEFT KNEE REPLACEMENT: ICD-10-CM

## 2023-04-28 ENCOUNTER — OFFICE VISIT (OUTPATIENT)
Dept: ORTHOPEDICS | Facility: CLINIC | Age: 68
End: 2023-04-28
Payer: MEDICARE

## 2023-04-28 VITALS — BODY MASS INDEX: 34.66 KG/M2 | HEIGHT: 69 IN | WEIGHT: 234 LBS

## 2023-04-28 DIAGNOSIS — Z96.652 STATUS POST LEFT KNEE REPLACEMENT: Primary | ICD-10-CM

## 2023-04-28 DIAGNOSIS — Z96.652 STATUS POST LEFT KNEE REPLACEMENT: ICD-10-CM

## 2023-04-28 PROCEDURE — 3044F HG A1C LEVEL LT 7.0%: CPT | Mod: HCNC,CPTII,S$GLB, | Performed by: ORTHOPAEDIC SURGERY

## 2023-04-28 PROCEDURE — 99024 PR POST-OP FOLLOW-UP VISIT: ICD-10-PCS | Mod: HCNC,S$GLB,, | Performed by: ORTHOPAEDIC SURGERY

## 2023-04-28 PROCEDURE — 4010F ACE/ARB THERAPY RXD/TAKEN: CPT | Mod: HCNC,CPTII,S$GLB, | Performed by: ORTHOPAEDIC SURGERY

## 2023-04-28 PROCEDURE — 3008F PR BODY MASS INDEX (BMI) DOCUMENTED: ICD-10-PCS | Mod: HCNC,CPTII,S$GLB, | Performed by: ORTHOPAEDIC SURGERY

## 2023-04-28 PROCEDURE — 1125F PR PAIN SEVERITY QUANTIFIED, PAIN PRESENT: ICD-10-PCS | Mod: HCNC,CPTII,S$GLB, | Performed by: ORTHOPAEDIC SURGERY

## 2023-04-28 PROCEDURE — 1159F PR MEDICATION LIST DOCUMENTED IN MEDICAL RECORD: ICD-10-PCS | Mod: HCNC,CPTII,S$GLB, | Performed by: ORTHOPAEDIC SURGERY

## 2023-04-28 PROCEDURE — 1159F MED LIST DOCD IN RCRD: CPT | Mod: HCNC,CPTII,S$GLB, | Performed by: ORTHOPAEDIC SURGERY

## 2023-04-28 PROCEDURE — 4010F PR ACE/ARB THEARPY RXD/TAKEN: ICD-10-PCS | Mod: HCNC,CPTII,S$GLB, | Performed by: ORTHOPAEDIC SURGERY

## 2023-04-28 PROCEDURE — 99024 POSTOP FOLLOW-UP VISIT: CPT | Mod: HCNC,S$GLB,, | Performed by: ORTHOPAEDIC SURGERY

## 2023-04-28 PROCEDURE — 1125F AMNT PAIN NOTED PAIN PRSNT: CPT | Mod: HCNC,CPTII,S$GLB, | Performed by: ORTHOPAEDIC SURGERY

## 2023-04-28 PROCEDURE — 3044F PR MOST RECENT HEMOGLOBIN A1C LEVEL <7.0%: ICD-10-PCS | Mod: HCNC,CPTII,S$GLB, | Performed by: ORTHOPAEDIC SURGERY

## 2023-04-28 PROCEDURE — 99999 PR PBB SHADOW E&M-EST. PATIENT-LVL III: ICD-10-PCS | Mod: PBBFAC,HCNC,, | Performed by: ORTHOPAEDIC SURGERY

## 2023-04-28 PROCEDURE — 3008F BODY MASS INDEX DOCD: CPT | Mod: HCNC,CPTII,S$GLB, | Performed by: ORTHOPAEDIC SURGERY

## 2023-04-28 PROCEDURE — 99999 PR PBB SHADOW E&M-EST. PATIENT-LVL III: CPT | Mod: PBBFAC,HCNC,, | Performed by: ORTHOPAEDIC SURGERY

## 2023-04-28 RX ORDER — OXYCODONE HYDROCHLORIDE 5 MG/1
5-10 TABLET ORAL EVERY 4 HOURS PRN
Qty: 40 TABLET | Refills: 0 | Status: SHIPPED | OUTPATIENT
Start: 2023-04-28 | End: 2023-05-03

## 2023-04-28 NOTE — PROGRESS NOTES
"Ortho Knee Follow Up Note    PCP: Nafisa Garcia MD   Referring Provider: No referring provider defined for this encounter.        Assessment:  67 y.o. male status post left total Knee Primary completed on 4/5/23. Doing well. Expected pain. Home PT and HEP. Not started outpatient PT. But ROM is good at this stage.     Plan:  Follow up 3 months  Future Radiographs Indicated at next visit: No    Pain Management: Continue current pain management and Wean narcotic medications as tolerated  Anticoagulation: Continue ASA for total of 4 weeks duration post operatively  Wound Care: Ok to shower. No scrubbing incision. No soaking in pools or tubs for 6 weeks post operative.     Patient Reassurance:   Post-operative course discussed with patient. Patient reassured and supported. All questions answered.    ACTIVE PROBLEM LIST  Patient Active Problem List   Diagnosis    BPH (benign prostatic hypertrophy)    DJD (degenerative joint disease) of knee    Insomnia    Anxiety    Chronic low back pain    Tobacco abuse    Essential hypertension    Angina at rest    Chest pain, atypical    CHF (congestive heart failure)    Cardiomyopathy    Controlled type 2 diabetes mellitus without complication, without long-term current use of insulin    Atherosclerosis of aorta    Arthritis of left knee    Nuclear sclerosis of both eyes    Refractive error           HPI:  Eyad Rodriguez presents today for a post-op visit.     STATUS POST:  left total Knee Primary  BMI: Body mass index is 34.56 kg/m².    Post operative recovery was complicated by: None    Patient rates his condition as improving. Pleased with surgical outcome to date.     Functional Assessment:  Home PT  Functional Difficulties:  Interferes with sleep and Walking  Pain Medication:  Narcotic  Anticoagulation: Aspirin     EXAM:    left POST-OPERATIVE KNEE    Ht 5' 9" (1.753 m)   Wt 106.1 kg (234 lb)   BMI 34.56 kg/m²     Skin:  Appropriate post op appearance, No evidence of " erythema, warmth, discharge, or drainage, and Incision clean/dry/intact. One 5mm area of skin abrasion, no drainage and does not tract deep  Range of Motion: Flexion: 110, Extension 3  Neurovascular Status: Sensation intact in Sural, Saphenous, SPN, DPN and Tibial nerve distribution. 5 out of 5 strength in hip flexion, knee flexion/extension, ankle plantarflexion/dorsiflexion. 2+ dorsalis pedis    IMAGING:  X-ray Knee:   Implants are well fixed and aligned. There is no evidence of loosening. Anterior lucency possibly projectional, will continue to monitor.

## 2023-05-01 ENCOUNTER — CLINICAL SUPPORT (OUTPATIENT)
Dept: REHABILITATION | Facility: HOSPITAL | Age: 68
End: 2023-05-01
Attending: ORTHOPAEDIC SURGERY
Payer: MEDICARE

## 2023-05-01 DIAGNOSIS — M25.662 DECREASED ROM OF LEFT KNEE: ICD-10-CM

## 2023-05-01 DIAGNOSIS — R29.898 DECREASED STRENGTH OF LOWER EXTREMITY: ICD-10-CM

## 2023-05-01 DIAGNOSIS — Z74.09 DECREASED MOBILITY AND ENDURANCE: ICD-10-CM

## 2023-05-01 DIAGNOSIS — Z96.652 STATUS POST LEFT KNEE REPLACEMENT: ICD-10-CM

## 2023-05-01 PROCEDURE — 97161 PT EVAL LOW COMPLEX 20 MIN: CPT | Mod: HCNC,PN

## 2023-05-01 NOTE — PLAN OF CARE
"  [facility]   Physical Therapy Initial Evaluation     Date: 5/1/2023   Name: Eyad Rodriguez  Clinic Number: [id number]    Therapy Diagnosis:   Encounter Diagnoses   Name Primary?    Status post left knee replacement     Decreased ROM of left knee     Decreased strength of lower extremity     Decreased mobility and endurance      Physician: Shane Arreola MD    Physician Orders: PT Eval and Treat   Medical Diagnosis from Referral: Z96.652 (ICD-10-CM) - Status post left knee replacement  Evaluation Date: 5/1/2023  Authorization Period Expiration: 04/11/2024  Plan of Care Expiration: 8/1/23  Progress Note Due: 6/1/23  Visit # / Visits authorized: 1/ 1   FOTO: 1/3    Precautions: Standard, Diabetes, and Hx of heart failure, anxiety,      Time In: 11:55  Time Out: 12:38  Total Appointment Time (timed & untimed codes): 43 minutes    S/p Left knee replacement 4/5 Dr. Arreola    SUBJECTIVE     Date of onset: 4/5 s/p Left TKA     History of current condition - Eyad reports: He has been moving around good without much difficulty.  He is still using the RW but he feels like he is able to walk without it. He says he keeps it around just in case he needs it.  He does not have a cane at home. He denies N/T. He takes narcotics still and ices his leg as he needs.     Falls: none    Imaging, x-ray per patient chart signed by David Smith MD: "   FINDINGS:  Left knee arthroplasty identified.  The position alignment is satisfactory and unchanged as compared to the previous study"    Prior Therapy: home health PT   Social History: 3 steps to enter house lives alone  Occupation: retired   Prior Level of Function: independent with constant pain  Current Level of Function: walking with RW     Pain:  Current 5/10, worst 10/10, best 4/10   Location:  left knee(s)   Description: constant  Aggravating Factors: Walking, standing  Easing Factors: pain medication, ice, rest, and elevation    Patients goals: "I " "want to get back to walking with AD and pain."     Medical History:   Past Medical History:   Diagnosis Date    Angina pectoris     Anxiety     BPH (benign prostatic hypertrophy)     Chronic low back pain     followed by pain management    Disorder of kidney and ureter     DJD (degenerative joint disease) of knee     Heart failure     Hemorrhoid     HTN (hypertension)     Insomnia     Obesity     Personal history of kidney stones     Tobacco abuse     Type 2 diabetes mellitus        Surgical History:   Eyad Rodriguez  has a past surgical history that includes cyst removed from back and Total knee arthroplasty (Left, 4/5/2023).    Medications:   Eyad has a current medication list which includes the following prescription(s): acetaminophen, alprazolam, alprostadil, amlodipine, aspirin, celecoxib, docusate sodium, flash glucose scanning reader, flash glucose sensor, lancets, lisinopril, metoprolol succinate, oxycodone, pen needle, diabetic, pregabalin, true metrix air glucose meter, true metrix glucose test strip, trueplus lancets, and zolpidem.    Allergies:   Review of patient's allergies indicates:   Allergen Reactions    Cyclobenzaprine Hives and Other (See Comments)     Other reaction(s): Nausea          OBJECTIVE     Observation: incision over left knee, swelling, redness      Range of Motion: Knee   Left Right   Flexion: 95 deg with pain 116 deg   Extension +6 deg -1 deg into hyperextension      Strength: Knee   Left Right   Quadriceps 4-/5 5/5   Hamstrings 4-/5 5/5       Strength: Hip   Left Right   Iliopsoas 4/5 4+/5   Glute Med NT NT   Hip IR 4/5 4-/5   Hip ER 4/5 4-/5   Hip Ext NT NT   Ankle PF 4+/5 4+/5   Ankle DF 4+/5 4+/5       Special Tests: Knee not indicated     Joint Mobility: hypomobile tibial femoral mobility with knee extension and flexion  Palpation: global tenderness around Left knee   Sensation: intact to light touch    Edema:  Left: moderate  Right: absent      Gait: Michael ambulated with " "rolling walker.  Level of Assistance: Mod I   Patient displays antalgic gait.   Balance: NT     30 second sit to stand: 13 reps without use of hands right lower extremity posterior   Timed up and Go: 14 seconds without AD         Limitation/Restriction for FOTO Knee Survey    Therapist reviewed FOTO scores for Eyad Rodriguez on 5/1/2023.   FOTO documents entered into [employer] - see Media section.    Limitation Score: 28%         TREATMENT     Total Treatment time (time-based codes) separate from Evaluation: 15 minutes      Eyad received the treatments listed below:        neuromuscular re-education activities to improve: Coordination, Proprioception, and Posture for 15 minutes. The following activities were included:  Heel prop +4# 5'   Quad set 5" x20   Short arc quad 5" x20   Straight leg raise 2x10 (extensor lag)  Long arc quad 5" 2x10   Heel slides 3'         PATIENT EDUCATION AND HOME EXERCISES     Education provided:   - Home exercise program   - Plan of Care       Written Home Exercises Provided: yes. Exercises were reviewed and Eyad was able to demonstrate them prior to the end of the session.  Eyad demonstrated good  understanding of the education provided. See EMR under Patient Instructions for exercises provided during therapy sessions.    ASSESSMENT     Eyad is a 67 y.o. male referred to outpatient Physical Therapy with a medical diagnosis of Status post left knee replacement Dr. Arreola 4/5/23. Patient presents to clinic 3 weeks and 5 days post op ambulating with RW. PT noted increase in nonpitting edema of left lower extremity as compared to Right. He displayed limitations with active range of motion knee flexion and extension of Left as compared to Right. Patient was able to complete Timed up and Go without AD with antalgic gait noted due to lack of TKE. He also displayed good functional strength with sit to stand. However, he had increase in weight bearing through right lower extremity as " compared to Left. At this time he is most limited with his ability to stand or walk for prolonged periods of time.     Patient prognosis is Good.   Patient will benefit from skilled outpatient Physical Therapy to address the deficits stated above and in the chart below, provide patient /family education, and to maximize patientt's level of independence.     Plan of care discussed with patient: Yes  Patient's spiritual, cultural and educational needs considered and patient is agreeable to the plan of care and goals as stated below:     Anticipated Barriers for therapy: co-morbidities     Medical Necessity is demonstrated by the following  History  Co-morbidities and personal factors that may impact the plan of care Co-morbidities:   Angina pectoris    Anxiety    BPH (benign prostatic hypertrophy)    Chronic low back pain    followed by pain management   Disorder of kidney and ureter    DJD (degenerative joint disease) of knee    Heart failure    Hemorrhoid    HTN (hypertension)    Insomnia    Obesity    Personal history of kidney stones    Tobacco abuse    Type 2 diabetes mellitus        Personal Factors:   age  coping style  social background  lifestyle     moderate   Examination  Body Structures and Functions, activity limitations and participation restrictions that may impact the plan of care Body Regions:   lower extremities    Body Systems:    gross symmetry  ROM  strength  gross coordinated movement  balance  gait  transfers  transitions  motor control  motor learning  edema  scar formation    Participation Restrictions:   Walking, standing, bending    Activity limitations:   Learning and applying knowledge  no deficits    General Tasks and Commands  no deficits    Communication  no deficits    Mobility  lifting and carrying objects  walking    Self care  washing oneself (bathing, drying, washing hands)    Domestic Life  shopping  cooking  doing house work (cleaning house, washing dishes, laundry)  assisting  others    Interactions/Relationships  no deficits    Life Areas  no deficits    Community and Social Life  community life  recreation and leisure         moderate   Clinical Presentation stable and uncomplicated low   Decision Making/ Complexity Score: low     Short Term GOALS: 4 weeks. Pt agrees with goals set.  1. Patient demonstrates independence with HEP.   2.Patient will demonstrate 0 degrees of knee extension to improve gait mechanics  3. Patient demonstrates ability to perform Timed up and Go without AD in 12 seconds or less to improve mobility and decrease risk for falls.   4. Patient demonstrates ability to perform 17 sit to stand with 30 second sit to stand test to improve functional strength and mobility.     Long Term GOALS: 8 weeks. Pt agrees with goals set.  1. Patient demonstrates increased Left knee flexion  to 115 degrees to improve tolerance to functional activities pain free.   2. Patient demonstrates increased strength BLE's to 4+/5 or greater to improve tolerance to functional activities pain free.   3. Patient demonstrates improved overall function per FOTO Knee Survey to 20% Limitation or less.   4. Patient will be able to walk 1 mile with little to no difficulty to improve tolerance to functional tasks pain free.     PLAN   Plan of care Certification: 5/1/2023 to 8/1/23.    Outpatient Physical Therapy 1-2 times weekly for 10 weeks to include the following interventions: Gait Training, Manual Therapy, Moist Heat/ Ice, Neuromuscular Re-ed, Patient Education, Therapeutic Activities, Therapeutic Exercise, and dry needling PRN.     Janette Lange, PT,DPT  05/01/2023        I CERTIFY THE NEED FOR THESE SERVICES FURNISHED UNDER THIS PLAN OF TREATMENT AND WHILE UNDER MY CARE   Physician's comments:     Physician's Signature: ___________________________________________________

## 2023-05-03 DIAGNOSIS — Z96.652 STATUS POST LEFT KNEE REPLACEMENT: ICD-10-CM

## 2023-05-03 DIAGNOSIS — E11.9 TYPE 2 DIABETES MELLITUS WITHOUT COMPLICATION: ICD-10-CM

## 2023-05-03 RX ORDER — OXYCODONE HYDROCHLORIDE 5 MG/1
TABLET ORAL
Qty: 40 TABLET | Refills: 0 | Status: SHIPPED | OUTPATIENT
Start: 2023-05-03 | End: 2023-05-11

## 2023-05-03 NOTE — PROGRESS NOTES
"[facility]   Physical Therapy Treatment Note     Name: Eyad Rodriguez  Clinic Number: [id number]    Therapy Diagnosis:   Encounter Diagnoses   Name Primary?    Decreased ROM of left knee Yes    Decreased strength of lower extremity     Decreased mobility and endurance      Physician: Shane Arreola MD    Visit Date: 5/4/2023    Physician Orders: PT Eval and Treat   Medical Diagnosis from Referral: Z96.652 (ICD-10-CM) - Status post left knee replacement  Evaluation Date: 5/1/2023  Authorization Period Expiration: 04/11/2024  Plan of Care Expiration: 8/1/23  Progress Note Due: 6/1/23  Visit # / Visits authorized: 1/ 1   FOTO: 1/3     Precautions: Standard, Diabetes, and Hx of heart failure, anxiety,      PTA Visit #: 1/5     Time In: 9:25  Time Out: 10:10 (early end per pt choice)  Total Billable Time: 45 minutes (30 billed)    SUBJECTIVE     Pt reports: He is doing a lot and pushing himself at home but is not able to get by without a pain pill so it is hard to tell what his pain is right now.  He was compliant with home exercise program.  Response to previous treatment: first treatment  Functional change: ongoing    Pain: 5/10  Location: left knee      OBJECTIVE     Objective Measures updated at progress report unless specified.     Treatment     Eyad received the treatments listed below:      therapeutic exercises to develop strength, endurance, and ROM for 25 minutes including:  Heel prop +4# 5'   Quad set 5" x30   Short arc quad 5" x30 w/ 2#   Straight leg raise 2x10 (extensor lag)  Long arc quad 5" 3x10   Heel slides 3'     manual therapy techniques: Joint mobilizations and Soft tissue Mobilization were applied to the: L knee for 10 minutes, including:  Overpressure flexion and extension holds  Patellar mobs    neuromuscular re-education activities to improve: Coordination, Proprioception, and Posture for 10 minutes. The following activities were included:  Hamstring curl w/ red " band 2x10  Sit to stand TKE 2x10       Patient Education and Home Exercises     Home Exercises Provided and Patient Education Provided     Education provided:   - Home exercise program         Written Home Exercises Provided: yes. Exercises were reviewed and Eyad was able to demonstrate them prior to the end of the session.  Eyad demonstrated good  understanding of the education provided. See EMR under Patient Instructions for exercises provided during therapy sessions.       ASSESSMENT     Patient arrives to therapy feeling good. He reports compliance with HEP. Continued with exercises to increase mobility of knee and work to increase engagement of quad muscles. Patient tolerates activities well, cues required to slow movements down in order to fully engage muscle. Patient progressed with use of weights, however extensor lag remains present with LAQ and SLR. Patient elected to end session early due to not wanting to overdo it. Education provided on importance of progression as tolerated. Plan to continue with exercises and work to reduce use of AD.    Eyad Is progressing well towards his goals.   Pt prognosis is Good.     Pt will continue to benefit from skilled outpatient physical therapy to address the deficits listed in the problem list box on initial evaluation, provide pt/family education and to maximize pt's level of independence in the home and community environment.     Pt's spiritual, cultural and educational needs considered and pt agreeable to plan of care and goals.     Anticipated barriers to physical therapy: co-morbidities     Goals: Short Term GOALS: 4 weeks. Pt agrees with goals set.  1. Patient demonstrates independence with HEP.   2.Patient will demonstrate 0 degrees of knee extension to improve gait mechanics  3. Patient demonstrates ability to perform Timed up and Go without AD in 12 seconds or less to improve mobility and decrease risk for falls.   4. Patient demonstrates ability to perform  17 sit to stand with 30 second sit to stand test to improve functional strength and mobility.      Long Term GOALS: 8 weeks. Pt agrees with goals set.  1. Patient demonstrates increased Left knee flexion  to 115 degrees to improve tolerance to functional activities pain free.   2. Patient demonstrates increased strength BLE's to 4+/5 or greater to improve tolerance to functional activities pain free.   3. Patient demonstrates improved overall function per FOTO Knee Survey to 20% Limitation or less.   4. Patient will be able to walk 1 mile with little to no difficulty to improve tolerance to functional tasks pain free.     PLAN     Increased knee mobility for functional use and ambulation without AD. Increase lower extremity strength and stability.     Effie Ho, PTA

## 2023-05-04 ENCOUNTER — CLINICAL SUPPORT (OUTPATIENT)
Dept: REHABILITATION | Facility: HOSPITAL | Age: 68
End: 2023-05-04
Attending: ORTHOPAEDIC SURGERY
Payer: MEDICARE

## 2023-05-04 DIAGNOSIS — Z74.09 DECREASED MOBILITY AND ENDURANCE: ICD-10-CM

## 2023-05-04 DIAGNOSIS — R29.898 DECREASED STRENGTH OF LOWER EXTREMITY: ICD-10-CM

## 2023-05-04 DIAGNOSIS — M25.662 DECREASED ROM OF LEFT KNEE: Primary | ICD-10-CM

## 2023-05-04 PROCEDURE — 97140 MANUAL THERAPY 1/> REGIONS: CPT | Mod: HCNC,PN,CQ

## 2023-05-04 PROCEDURE — 97110 THERAPEUTIC EXERCISES: CPT | Mod: HCNC,PN,CQ

## 2023-05-04 PROCEDURE — 97112 NEUROMUSCULAR REEDUCATION: CPT | Mod: HCNC,PN,CQ

## 2023-05-09 ENCOUNTER — CLINICAL SUPPORT (OUTPATIENT)
Dept: REHABILITATION | Facility: HOSPITAL | Age: 68
End: 2023-05-09
Attending: ORTHOPAEDIC SURGERY
Payer: MEDICARE

## 2023-05-09 DIAGNOSIS — R29.898 DECREASED STRENGTH OF LOWER EXTREMITY: ICD-10-CM

## 2023-05-09 DIAGNOSIS — M25.662 DECREASED ROM OF LEFT KNEE: Primary | ICD-10-CM

## 2023-05-09 DIAGNOSIS — Z74.09 DECREASED MOBILITY AND ENDURANCE: ICD-10-CM

## 2023-05-09 PROCEDURE — 97110 THERAPEUTIC EXERCISES: CPT | Mod: HCNC,PN

## 2023-05-09 PROCEDURE — 97140 MANUAL THERAPY 1/> REGIONS: CPT | Mod: HCNC,PN

## 2023-05-09 PROCEDURE — 97112 NEUROMUSCULAR REEDUCATION: CPT | Mod: HCNC,PN

## 2023-05-09 NOTE — PROGRESS NOTES
[facility]   Physical Therapy Treatment Note     Name: Eyad Collins Sullivan  Clinic Number: [id number]    Therapy Diagnosis:   Encounter Diagnoses   Name Primary?    Decreased ROM of left knee Yes    Decreased strength of lower extremity     Decreased mobility and endurance        Physician: Shane Arreola MD    Visit Date: 5/9/2023    Physician Orders: PT Eval and Treat   Medical Diagnosis from Referral: Z96.652 (ICD-10-CM) - Status post left knee replacement  Evaluation Date: 5/1/2023  Authorization Period Expiration: 04/11/2024  Plan of Care Expiration: 8/1/23  Progress Note Due: 6/1/23  Visit # / Visits authorized: 2/20 (+eval)  FOTO: 1/3     Precautions: Standard, Diabetes, and Hx of heart failure, anxiety,      PTA Visit #: 0/5     Time In: 1040a  Time Out: 1140   Total Billable Time: 60 minutes     SUBJECTIVE     Pt reports:He has been trying to walk without the walker. He did take his pain medication this morning. His knee was hurting last night before he went to bed. He felt a little better this mroning but he wanted to take a pain pill before therapy to help with the exercises.   He was compliant with home exercise program.  Response to previous treatment: sore  Functional change: ongoing    Pain: 5/10  Location: left knee      OBJECTIVE     Objective Measures updated at progress report unless specified.     5/9/23:    Left knee range of motion    Knee extension: lacking 2 degrees of knee extension    Knee flexion: 111 deg with heel slides    Treatment     Eyad received the treatments listed below:      therapeutic exercises to develop strength, endurance, and ROM for 15 minutes including:  Heel prop 4# 5'   Straight leg raise 2x10 (extensor lag) Vic to decrease lag  Heel slides 5'   Hamstring curl w/ red band 2x10    manual therapy techniques: Joint mobilizations and Soft tissue Mobilization were applied to the: L knee for 10  minutes, including:  Overpressure flexion and  "extension holds  Patellar mobs    neuromuscular re-education activities to improve: Coordination, Proprioception, and Posture for 35 minutes. The following activities were included:  Quad set with heel prop 5" x30   Short arc quad 5" 3x10 w/ 2#  with strap for last bit of extension   Long arc quad 5" 3x10   Sit to stand TKE Red sport cord 2x10      Patient Education and Home Exercises     Home Exercises Provided and Patient Education Provided     Education provided:   - Home exercise program         Written Home Exercises Provided: yes. Exercises were reviewed and Eyad was able to demonstrate them prior to the end of the session.  Eyad demonstrated good  understanding of the education provided. See EMR under Patient Instructions for exercises provided during therapy sessions.       ASSESSMENT     Patient arrived to clinic ambulating without AD. He continues to display lack of TKE with gait pattern and is lacking full extension with measurements. Knee flexion continues to improve to 11 deg today. PT to trial Recumbent bike at next visit to continue to improve range of motion. Patient required verbal and visual cues to decrease posterior chain firing with quad sets. Patient continues to display extensor lag with straight leg raise and required Vic/ModA to properly perform exercise. He would continue to benefit from skilled PT to address range of motion and strength deficits.     Eyad Is progressing well towards his goals.   Pt prognosis is Good.     Pt will continue to benefit from skilled outpatient physical therapy to address the deficits listed in the problem list box on initial evaluation, provide pt/family education and to maximize pt's level of independence in the home and community environment.     Pt's spiritual, cultural and educational needs considered and pt agreeable to plan of care and goals.     Anticipated barriers to physical therapy: co-morbidities     Goals: Short Term GOALS: 4 weeks. Pt agrees " with goals set.  1. Patient demonstrates independence with HEP.  Progressing, not met  2.Patient will demonstrate 0 degrees of knee extension to improve gait mechanics Progressing, not met  3. Patient demonstrates ability to perform Timed up and Go without AD in 12 seconds or less to improve mobility and decrease risk for falls.  Progressing, not met  4. Patient demonstrates ability to perform 17 sit to stand with 30 second sit to stand test to improve functional strength and mobility. Progressing, not met     Long Term GOALS: 8 weeks. Pt agrees with goals set.  1. Patient demonstrates increased Left knee flexion  to 115 degrees to improve tolerance to functional activities pain free.  Progressing, not met  2. Patient demonstrates increased strength BLE's to 4+/5 or greater to improve tolerance to functional activities pain free. Progressing, not met  3. Patient demonstrates improved overall function per FOTO Knee Survey to 20% Limitation or less. Progressing, not met  4. Patient will be able to walk 1 mile with little to no difficulty to improve tolerance to functional tasks pain free. Progressing, not met    PLAN     Increased knee mobility for functional use and ambulation without AD. Increase lower extremity strength and stability.     Janette Lange, PT, DPT  05/09/2023

## 2023-05-10 NOTE — PROGRESS NOTES
"[facility]   Physical Therapy Treatment Note     Name: Eyad Rodriguez  Clinic Number: [id number]    Therapy Diagnosis:   Encounter Diagnoses   Name Primary?    Decreased ROM of left knee Yes    Decreased strength of lower extremity     Decreased mobility and endurance          Physician: Shane Arreola MD    Visit Date: 5/11/2023    Physician Orders: PT Eval and Treat   Medical Diagnosis from Referral: Z96.652 (ICD-10-CM) - Status post left knee replacement  Evaluation Date: 5/1/2023  Authorization Period Expiration: 04/11/2024  Plan of Care Expiration: 8/1/23  Progress Note Due: 6/1/23  Visit # / Visits authorized: 3/20 (+eval)  FOTO: 1/3     Precautions: Standard, Diabetes, and Hx of heart failure, anxiety,      PTA Visit #: 01/5     Time In: 9:15  Time Out: 8:10  Total Billable Time: 55 minutes ( 45 billed)    SUBJECTIVE     Pt reports: He was sore after last time but always feels better when he gets up and moves around.  He was compliant with home exercise program.  Response to previous treatment: sore  Functional change: ongoing    Pain: 5/10  Location: left knee      OBJECTIVE     Objective Measures updated at progress report unless specified.     5/9/23:    Treatment     Eyad received the treatments listed below:      therapeutic exercises to develop strength, endurance, and ROM for 25 minutes including:  Heel prop 4# 5'   Straight leg raise 2x10 (extensor lag) Vic to decrease lag  Heel slides 5'   Hamstring curl w/ red band 2x10    manual therapy techniques: Joint mobilizations and Soft tissue Mobilization were applied to the: L knee for 10  minutes, including:  Overpressure flexion and extension holds  Patellar mobs    neuromuscular re-education activities to improve: Coordination, Proprioception, and Posture for 25 minutes. The following activities were included:  Quad set with heel prop 5" x30   Short arc quad 5" 3x10 w/ 2#  with strap for last bit of extension   Long " "arc quad 5" 3x10   Sit to stand TKE Red sport cord 2x10      Patient Education and Home Exercises     Home Exercises Provided and Patient Education Provided     Education provided:   - Home exercise program         Written Home Exercises Provided: yes. Exercises were reviewed and Eyad was able to demonstrate them prior to the end of the session.  Eyad demonstrated good  understanding of the education provided. See EMR under Patient Instructions for exercises provided during therapy sessions.       ASSESSMENT     Patient presents with slight soreness from prior session but states he is feeling good overall. Continued with activities to increase knee range of motion and reduce muscle stiffness. Patient demonstrates increased tolerance today with minimal muscle fatigue present. Cues given to increase quad engagement with SLR for reduce extensor lag. Continue with strength progressions.     Eyad Is progressing well towards his goals.   Pt prognosis is Good.     Pt will continue to benefit from skilled outpatient physical therapy to address the deficits listed in the problem list box on initial evaluation, provide pt/family education and to maximize pt's level of independence in the home and community environment.     Pt's spiritual, cultural and educational needs considered and pt agreeable to plan of care and goals.     Anticipated barriers to physical therapy: co-morbidities     Goals: Short Term GOALS: 4 weeks. Pt agrees with goals set.  1. Patient demonstrates independence with HEP.  Progressing, not met  2.Patient will demonstrate 0 degrees of knee extension to improve gait mechanics Progressing, not met  3. Patient demonstrates ability to perform Timed up and Go without AD in 12 seconds or less to improve mobility and decrease risk for falls.  Progressing, not met  4. Patient demonstrates ability to perform 17 sit to stand with 30 second sit to stand test to improve functional strength and mobility. " Progressing, not met     Long Term GOALS: 8 weeks. Pt agrees with goals set.  1. Patient demonstrates increased Left knee flexion  to 115 degrees to improve tolerance to functional activities pain free.  Progressing, not met  2. Patient demonstrates increased strength BLE's to 4+/5 or greater to improve tolerance to functional activities pain free. Progressing, not met  3. Patient demonstrates improved overall function per FOTO Knee Survey to 20% Limitation or less. Progressing, not met  4. Patient will be able to walk 1 mile with little to no difficulty to improve tolerance to functional tasks pain free. Progressing, not met    PLAN     Increased knee mobility for functional use and ambulation without AD. Increase lower extremity strength and stability.     Effie Ho, PTA  05/11/2023

## 2023-05-11 ENCOUNTER — CLINICAL SUPPORT (OUTPATIENT)
Dept: REHABILITATION | Facility: HOSPITAL | Age: 68
End: 2023-05-11
Attending: ORTHOPAEDIC SURGERY
Payer: MEDICARE

## 2023-05-11 DIAGNOSIS — Z74.09 DECREASED MOBILITY AND ENDURANCE: ICD-10-CM

## 2023-05-11 DIAGNOSIS — Z96.652 STATUS POST LEFT KNEE REPLACEMENT: ICD-10-CM

## 2023-05-11 DIAGNOSIS — R29.898 DECREASED STRENGTH OF LOWER EXTREMITY: ICD-10-CM

## 2023-05-11 DIAGNOSIS — M25.662 DECREASED ROM OF LEFT KNEE: Primary | ICD-10-CM

## 2023-05-11 PROCEDURE — 97110 THERAPEUTIC EXERCISES: CPT | Mod: HCNC,PN,CQ

## 2023-05-11 PROCEDURE — 97112 NEUROMUSCULAR REEDUCATION: CPT | Mod: HCNC,PN,CQ

## 2023-05-11 PROCEDURE — 97140 MANUAL THERAPY 1/> REGIONS: CPT | Mod: HCNC,PN,CQ

## 2023-05-11 RX ORDER — OXYCODONE HYDROCHLORIDE 5 MG/1
5-10 TABLET ORAL EVERY 6 HOURS PRN
Qty: 30 TABLET | Refills: 0 | Status: SHIPPED | OUTPATIENT
Start: 2023-05-11 | End: 2023-05-16

## 2023-05-16 ENCOUNTER — EXTERNAL HOME HEALTH (OUTPATIENT)
Dept: HOME HEALTH SERVICES | Facility: HOSPITAL | Age: 68
End: 2023-05-16
Payer: MEDICARE

## 2023-05-16 ENCOUNTER — CLINICAL SUPPORT (OUTPATIENT)
Dept: REHABILITATION | Facility: HOSPITAL | Age: 68
End: 2023-05-16
Attending: ORTHOPAEDIC SURGERY
Payer: MEDICARE

## 2023-05-16 DIAGNOSIS — M25.662 DECREASED ROM OF LEFT KNEE: Primary | ICD-10-CM

## 2023-05-16 DIAGNOSIS — Z96.652 STATUS POST LEFT KNEE REPLACEMENT: ICD-10-CM

## 2023-05-16 DIAGNOSIS — Z74.09 DECREASED MOBILITY AND ENDURANCE: ICD-10-CM

## 2023-05-16 DIAGNOSIS — R29.898 DECREASED STRENGTH OF LOWER EXTREMITY: ICD-10-CM

## 2023-05-16 PROCEDURE — G0180 MD CERTIFICATION HHA PATIENT: HCPCS | Mod: ,,, | Performed by: ORTHOPAEDIC SURGERY

## 2023-05-16 PROCEDURE — G0180 PR HOME HEALTH MD CERTIFICATION: ICD-10-PCS | Mod: ,,, | Performed by: ORTHOPAEDIC SURGERY

## 2023-05-16 PROCEDURE — 97110 THERAPEUTIC EXERCISES: CPT | Mod: HCNC,PN,CQ

## 2023-05-16 PROCEDURE — 97112 NEUROMUSCULAR REEDUCATION: CPT | Mod: HCNC,PN,CQ

## 2023-05-16 RX ORDER — OXYCODONE HYDROCHLORIDE 5 MG/1
5-10 TABLET ORAL EVERY 6 HOURS PRN
Qty: 30 TABLET | Refills: 0 | Status: SHIPPED | OUTPATIENT
Start: 2023-05-16 | End: 2023-05-24

## 2023-05-16 NOTE — PROGRESS NOTES
"[facility]   Physical Therapy Treatment Note     Name: Eyad Rodriguez  Clinic Number: [id number]    Therapy Diagnosis:   Encounter Diagnoses   Name Primary?    Decreased ROM of left knee Yes    Decreased strength of lower extremity     Decreased mobility and endurance            Physician: Shane Arreola MD    Visit Date: 5/16/2023    Physician Orders: PT Eval and Treat   Medical Diagnosis from Referral: Z96.652 (ICD-10-CM) - Status post left knee replacement  Evaluation Date: 5/1/2023  Authorization Period Expiration: 04/11/2024  Plan of Care Expiration: 8/1/23  Progress Note Due: 6/1/23  Visit # / Visits authorized: 4/20 (+eval)  FOTO: 1/3     Precautions: Standard, Diabetes, and Hx of heart failure, anxiety,      PTA Visit #: 02/5     Time In: 10:38  Time Out: 9:23  Total Billable Time: 45 minutes    SUBJECTIVE     Pt reports: He is feeling good today, been doing some stairs today and it felt okay.   He was compliant with home exercise program.  Response to previous treatment: sore  Functional change: ongoing    Pain: 5/10  Location: left knee      OBJECTIVE     Objective Measures updated at progress report unless specified.     5/9/23:    Treatment     Eyad received the treatments listed below:      therapeutic exercises to develop strength, endurance, and ROM for 20 minutes including:  Heel prop 10 # 5'   Straight leg raise 3x10 (extensor lag)  Heel slides 5'   Hamstring curl w/ red band +3x10    manual therapy techniques: Joint mobilizations and Soft tissue Mobilization were applied to the: L knee for 00  minutes, including:  Overpressure flexion and extension holds  Patellar mobs    neuromuscular re-education activities to improve: Coordination, Proprioception, and Posture for 25 minutes. The following activities were included:  Quad set with heel prop 5" x30   Short arc quad 5" 3x10 w/ 2#  Long arc quad 5" 3x10   Sit to stand TKE Red sport cord +3x10      Patient Education " and Home Exercises     Home Exercises Provided and Patient Education Provided     Education provided:   - Home exercise program         Written Home Exercises Provided: yes. Exercises were reviewed and Eyad was able to demonstrate them prior to the end of the session.  Eyad demonstrated good  understanding of the education provided. See EMR under Patient Instructions for exercises provided during therapy sessions.       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 Education provided on limitations of activities to avoid injury. Continued with range of motion training with cues given to hold end range for longer duration. Patient progressed with over pressure weight used for increased extension. Patient continues to fatigue quickly by the end of 45 minute session. Discussed progressions to begin next care week, including more functional activity training. Education provided to increase HEP compliance.     Eyad Is progressing well towards his goals.   Pt prognosis is Good.     Pt will continue to benefit from skilled outpatient physical therapy to address the deficits listed in the problem list box on initial evaluation, provide pt/family education and to maximize pt's level of independence in the home and community environment.     Pt's spiritual, cultural and educational needs considered and pt agreeable to plan of care and goals.     Anticipated barriers to physical therapy: co-morbidities     Goals: Short Term GOALS: 4 weeks. Pt agrees with goals set.  1. Patient demonstrates independence with HEP.  Progressing, not met  2.Patient will demonstrate 0 degrees of knee extension to improve gait mechanics Progressing, not met  3. Patient demonstrates ability to perform Timed up and Go without AD in 12 seconds or less to improve mobility and decrease risk for falls.  Progressing, not met  4. Patient demonstrates ability to perform 17 sit  to stand with 30 second sit to stand test to improve functional strength and mobility. Progressing, not met     Long Term GOALS: 8 weeks. Pt agrees with goals set.  1. Patient demonstrates increased Left knee flexion  to 115 degrees to improve tolerance to functional activities pain free.  Progressing, not met  2. Patient demonstrates increased strength BLE's to 4+/5 or greater to improve tolerance to functional activities pain free. Progressing, not met  3. Patient demonstrates improved overall function per FOTO Knee Survey to 20% Limitation or less. Progressing, not met  4. Patient will be able to walk 1 mile with little to no difficulty to improve tolerance to functional tasks pain free. Progressing, not met    PLAN     Increased knee mobility for functional use and ambulation without AD. Increase lower extremity strength and stability.     Effie Ho, PTA  05/16/2023

## 2023-05-17 DIAGNOSIS — E11.9 TYPE 2 DIABETES MELLITUS WITHOUT COMPLICATION: ICD-10-CM

## 2023-05-17 NOTE — PROGRESS NOTES
"[facility]   Physical Therapy Treatment Note     Name: Eyad Rodriguez  Clinic Number: [id number]    Therapy Diagnosis:   Encounter Diagnoses   Name Primary?    Decreased ROM of left knee Yes    Decreased strength of lower extremity     Decreased mobility and endurance              Physician: Shane Arreola MD    Visit Date: 5/18/2023    Physician Orders: PT Eval and Treat   Medical Diagnosis from Referral: Z96.652 (ICD-10-CM) - Status post left knee replacement  Evaluation Date: 5/1/2023  Authorization Period Expiration: 04/11/2024  Plan of Care Expiration: 8/1/23  Progress Note Due: 6/1/23  Visit # / Visits authorized: 5/20 (+eval)  FOTO: 1/3     Precautions: Standard, Diabetes, and Hx of heart failure, anxiety,      PTA Visit #: 3/5     Time In: 10:55  Time Out: 11:40  Total Billable Time: 45 minutes    SUBJECTIVE     Pt reports: He was sore after last time.   He was compliant with home exercise program.  Response to previous treatment: sore  Functional change: ongoing    Pain: 5/10  Location: left knee      OBJECTIVE     Objective Measures updated at progress report unless specified.     5/9/23:    Treatment     Eyad received the treatments listed below:      therapeutic exercises to develop strength, endurance, and ROM for 25 minutes including:  Heel prop 10 # 5'   Straight leg raise 3x10 (extensor lag)  Heel slides 5'   Hamstring curl w/ red band +3x10  +upright bike 5'    manual therapy techniques: Joint mobilizations and Soft tissue Mobilization were applied to the: L knee for 00  minutes, including:  Overpressure flexion and extension holds  Patellar mobs    neuromuscular re-education activities to improve: Coordination, Proprioception, and Posture for 20 minutes. The following activities were included:  Quad set with heel prop 5" x30   Short arc quad 5" 3x10 w/ 2#  Long arc quad 5" 3x10   Sit to stand TKE Red sport cord +3x10      Patient Education and Home Exercises "     Home Exercises Provided and Patient Education Provided     Education provided:   - Home exercise program         Written Home Exercises Provided: yes. Exercises were reviewed and Eyad was able to demonstrate them prior to the end of the session.  Eyad demonstrated good  understanding of the education provided. See EMR under Patient Instructions for exercises provided during therapy sessions.       ASSESSMENT     Patient arrives with slight stiffness today, continued with mobility and strength training. Patient progressed to use of upright bike with full rotations completed and tolerates increased sit to stands this date. Continued limitations remain within range of motion B directions, however good progress is noted this week. Plan to increase mobility and begin functional training (such as stairs and gait cycle) next care visit.     Eyad Is progressing well towards his goals.   Pt prognosis is Good.     Pt will continue to benefit from skilled outpatient physical therapy to address the deficits listed in the problem list box on initial evaluation, provide pt/family education and to maximize pt's level of independence in the home and community environment.     Pt's spiritual, cultural and educational needs considered and pt agreeable to plan of care and goals.     Anticipated barriers to physical therapy: co-morbidities     Goals: Short Term GOALS: 4 weeks. Pt agrees with goals set.  1. Patient demonstrates independence with HEP.  Progressing, not met  2.Patient will demonstrate 0 degrees of knee extension to improve gait mechanics Progressing, not met  3. Patient demonstrates ability to perform Timed up and Go without AD in 12 seconds or less to improve mobility and decrease risk for falls.  Progressing, not met  4. Patient demonstrates ability to perform 17 sit to stand with 30 second sit to stand test to improve functional strength and mobility. Progressing, not met     Long Term GOALS: 8 weeks. Pt agrees  with goals set.  1. Patient demonstrates increased Left knee flexion  to 115 degrees to improve tolerance to functional activities pain free.  Progressing, not met  2. Patient demonstrates increased strength BLE's to 4+/5 or greater to improve tolerance to functional activities pain free. Progressing, not met  3. Patient demonstrates improved overall function per FOTO Knee Survey to 20% Limitation or less. Progressing, not met  4. Patient will be able to walk 1 mile with little to no difficulty to improve tolerance to functional tasks pain free. Progressing, not met    PLAN     Increased knee mobility for functional use and ambulation without AD. Increase lower extremity strength and stability.     Effie Ho, PTA  05/18/2023

## 2023-05-18 ENCOUNTER — TELEPHONE (OUTPATIENT)
Dept: FAMILY MEDICINE | Facility: CLINIC | Age: 68
End: 2023-05-18
Payer: MEDICARE

## 2023-05-18 ENCOUNTER — CLINICAL SUPPORT (OUTPATIENT)
Dept: REHABILITATION | Facility: HOSPITAL | Age: 68
End: 2023-05-18
Attending: ORTHOPAEDIC SURGERY
Payer: MEDICARE

## 2023-05-18 DIAGNOSIS — M25.662 DECREASED ROM OF LEFT KNEE: Primary | ICD-10-CM

## 2023-05-18 DIAGNOSIS — R29.898 DECREASED STRENGTH OF LOWER EXTREMITY: ICD-10-CM

## 2023-05-18 DIAGNOSIS — Z74.09 DECREASED MOBILITY AND ENDURANCE: ICD-10-CM

## 2023-05-18 PROCEDURE — 97110 THERAPEUTIC EXERCISES: CPT | Mod: HCNC,PN,CQ

## 2023-05-18 PROCEDURE — 97112 NEUROMUSCULAR REEDUCATION: CPT | Mod: HCNC,PN,CQ

## 2023-05-19 ENCOUNTER — TELEPHONE (OUTPATIENT)
Dept: ADMINISTRATIVE | Facility: CLINIC | Age: 68
End: 2023-05-19
Payer: MEDICARE

## 2023-05-19 NOTE — TELEPHONE ENCOUNTER
Called pt, informed pt I was calling to remind pt of his in office EAWV on 5/22/23; pt stated he would like to cancel the appt because he has a lot going on right now; pt declined to reschedule and requested a call back in a few weeks; appt canceled per pt request

## 2023-05-21 ENCOUNTER — DOCUMENT SCAN (OUTPATIENT)
Dept: HOME HEALTH SERVICES | Facility: HOSPITAL | Age: 68
End: 2023-05-21
Payer: MEDICARE

## 2023-05-22 NOTE — PROGRESS NOTES
"[facility]   Physical Therapy Treatment Note     Name: Eyad Rodriguez  Clinic Number: [id number]    Therapy Diagnosis:   No diagnosis found.            Physician: Shane Arreola MD    Visit Date: 5/23/2023    Physician Orders: PT Eval and Treat   Medical Diagnosis from Referral: Z96.652 (ICD-10-CM) - Status post left knee replacement  Evaluation Date: 5/1/2023  Authorization Period Expiration: 04/11/2024  Plan of Care Expiration: 8/1/23  Progress Note Due: 6/1/23  Visit # / Visits authorized: 6/20 (+eval)  FOTO: 1/3     Precautions: Standard, Diabetes, and Hx of heart failure, anxiety,      PTA Visit #: 0/5     Time In: ***  Time Out: ***  Total Billable Time: *** minutes    SUBJECTIVE     Pt reports: ***He was sore after last time.   He was compliant with home exercise program.  Response to previous treatment: sore  Functional change: ongoing    Pain: 5/10  Location: left knee      OBJECTIVE     Objective Measures updated at progress report unless specified.     5/9/23:    Treatment     Eyad received the treatments listed below:      therapeutic exercises to develop strength, endurance, and ROM for ***  minutes including:  Heel prop 10 # 5'   Straight leg raise 3x10 (extensor lag)  Heel slides 5'   Hamstring curl w/ red band +3x10  +upright bike 5'    manual therapy techniques: Joint mobilizations and Soft tissue Mobilization were applied to the: L knee for 00  minutes, including:  Overpressure flexion and extension holds  Patellar mobs    neuromuscular re-education activities to improve: Coordination, Proprioception, and Posture for *** minutes. The following activities were included:  Quad set with heel prop 5" x30   Short arc quad 5" 3x10 w/ 2#  Long arc quad 5" 3x10   Sit to stand TKE Red sport cord +3x10      Patient Education and Home Exercises     Home Exercises Provided and Patient Education Provided     Education provided:   - Home exercise program         Written Home " Exercises Provided: yes. Exercises were reviewed and Eyad was able to demonstrate them prior to the end of the session.  Eyad demonstrated good  understanding of the education provided. See EMR under Patient Instructions for exercises provided during therapy sessions.       ASSESSMENT     ***Patient arrives with slight stiffness today, continued with mobility and strength training. Patient progressed to use of upright bike with full rotations completed and tolerates increased sit to stands this date. Continued limitations remain within range of motion B directions, however good progress is noted this week. Plan to increase mobility and begin functional training (such as stairs and gait cycle) next care visit.     Eyad Is progressing well towards his goals.   Pt prognosis is Good.     Pt will continue to benefit from skilled outpatient physical therapy to address the deficits listed in the problem list box on initial evaluation, provide pt/family education and to maximize pt's level of independence in the home and community environment.     Pt's spiritual, cultural and educational needs considered and pt agreeable to plan of care and goals.     Anticipated barriers to physical therapy: co-morbidities     Goals: Short Term GOALS: 4 weeks. Pt agrees with goals set.  1. Patient demonstrates independence with HEP.  Progressing, not met  2.Patient will demonstrate 0 degrees of knee extension to improve gait mechanics Progressing, not met  3. Patient demonstrates ability to perform Timed up and Go without AD in 12 seconds or less to improve mobility and decrease risk for falls.  Progressing, not met  4. Patient demonstrates ability to perform 17 sit to stand with 30 second sit to stand test to improve functional strength and mobility. Progressing, not met     Long Term GOALS: 8 weeks. Pt agrees with goals set.  1. Patient demonstrates increased Left knee flexion  to 115 degrees to improve tolerance to functional  activities pain free.  Progressing, not met  2. Patient demonstrates increased strength BLE's to 4+/5 or greater to improve tolerance to functional activities pain free. Progressing, not met  3. Patient demonstrates improved overall function per FOTO Knee Survey to 20% Limitation or less. Progressing, not met  4. Patient will be able to walk 1 mile with little to no difficulty to improve tolerance to functional tasks pain free. Progressing, not met    PLAN     Increased knee mobility for functional use and ambulation without AD. Increase lower extremity strength and stability.     Janette Lange, PT,DPT  05/22/2023

## 2023-05-23 ENCOUNTER — CLINICAL SUPPORT (OUTPATIENT)
Dept: REHABILITATION | Facility: HOSPITAL | Age: 68
End: 2023-05-23
Attending: ORTHOPAEDIC SURGERY
Payer: MEDICARE

## 2023-05-23 DIAGNOSIS — Z74.09 DECREASED MOBILITY AND ENDURANCE: ICD-10-CM

## 2023-05-23 DIAGNOSIS — R29.898 DECREASED STRENGTH OF LOWER EXTREMITY: ICD-10-CM

## 2023-05-23 DIAGNOSIS — M25.662 DECREASED ROM OF LEFT KNEE: Primary | ICD-10-CM

## 2023-05-23 PROCEDURE — 97140 MANUAL THERAPY 1/> REGIONS: CPT | Mod: HCNC,PN,CQ

## 2023-05-23 PROCEDURE — 97112 NEUROMUSCULAR REEDUCATION: CPT | Mod: HCNC,PN,CQ

## 2023-05-23 PROCEDURE — 97530 THERAPEUTIC ACTIVITIES: CPT | Mod: HCNC,PN,CQ

## 2023-05-23 PROCEDURE — 97110 THERAPEUTIC EXERCISES: CPT | Mod: HCNC,PN,CQ

## 2023-05-24 DIAGNOSIS — Z96.652 STATUS POST LEFT KNEE REPLACEMENT: ICD-10-CM

## 2023-05-24 RX ORDER — OXYCODONE HYDROCHLORIDE 5 MG/1
5-10 TABLET ORAL EVERY 6 HOURS PRN
Qty: 30 TABLET | Refills: 0 | Status: SHIPPED | OUTPATIENT
Start: 2023-05-24 | End: 2023-06-02

## 2023-05-25 ENCOUNTER — CLINICAL SUPPORT (OUTPATIENT)
Dept: REHABILITATION | Facility: HOSPITAL | Age: 68
End: 2023-05-25
Attending: ORTHOPAEDIC SURGERY
Payer: MEDICARE

## 2023-05-25 DIAGNOSIS — M25.662 DECREASED ROM OF LEFT KNEE: Primary | ICD-10-CM

## 2023-05-25 DIAGNOSIS — R29.898 DECREASED STRENGTH OF LOWER EXTREMITY: ICD-10-CM

## 2023-05-25 DIAGNOSIS — Z74.09 DECREASED MOBILITY AND ENDURANCE: ICD-10-CM

## 2023-05-25 PROCEDURE — 97140 MANUAL THERAPY 1/> REGIONS: CPT | Mod: HCNC,PN

## 2023-05-29 ENCOUNTER — OFFICE VISIT (OUTPATIENT)
Dept: FAMILY MEDICINE | Facility: CLINIC | Age: 68
End: 2023-05-29
Payer: MEDICARE

## 2023-05-29 VITALS
SYSTOLIC BLOOD PRESSURE: 128 MMHG | OXYGEN SATURATION: 95 % | HEIGHT: 69 IN | DIASTOLIC BLOOD PRESSURE: 60 MMHG | HEART RATE: 70 BPM | TEMPERATURE: 98 F | BODY MASS INDEX: 33.47 KG/M2 | WEIGHT: 226 LBS

## 2023-05-29 DIAGNOSIS — E11.9 CONTROLLED TYPE 2 DIABETES MELLITUS WITHOUT COMPLICATION, WITHOUT LONG-TERM CURRENT USE OF INSULIN: ICD-10-CM

## 2023-05-29 DIAGNOSIS — F51.02 TRANSIENT INSOMNIA: ICD-10-CM

## 2023-05-29 DIAGNOSIS — I70.0 ATHEROSCLEROSIS OF AORTA: Primary | ICD-10-CM

## 2023-05-29 DIAGNOSIS — I10 ESSENTIAL HYPERTENSION: Chronic | ICD-10-CM

## 2023-05-29 DIAGNOSIS — F41.9 ANXIETY: ICD-10-CM

## 2023-05-29 PROCEDURE — 1159F PR MEDICATION LIST DOCUMENTED IN MEDICAL RECORD: ICD-10-PCS | Mod: HCNC,CPTII,S$GLB, | Performed by: FAMILY MEDICINE

## 2023-05-29 PROCEDURE — 99999 PR PBB SHADOW E&M-EST. PATIENT-LVL IV: ICD-10-PCS | Mod: PBBFAC,HCNC,, | Performed by: FAMILY MEDICINE

## 2023-05-29 PROCEDURE — 1125F AMNT PAIN NOTED PAIN PRSNT: CPT | Mod: HCNC,CPTII,S$GLB, | Performed by: FAMILY MEDICINE

## 2023-05-29 PROCEDURE — 99215 PR OFFICE/OUTPT VISIT, EST, LEVL V, 40-54 MIN: ICD-10-PCS | Mod: HCNC,S$GLB,, | Performed by: FAMILY MEDICINE

## 2023-05-29 PROCEDURE — 1159F MED LIST DOCD IN RCRD: CPT | Mod: HCNC,CPTII,S$GLB, | Performed by: FAMILY MEDICINE

## 2023-05-29 PROCEDURE — 4010F ACE/ARB THERAPY RXD/TAKEN: CPT | Mod: HCNC,CPTII,S$GLB, | Performed by: FAMILY MEDICINE

## 2023-05-29 PROCEDURE — 1101F PR PT FALLS ASSESS DOC 0-1 FALLS W/OUT INJ PAST YR: ICD-10-PCS | Mod: HCNC,CPTII,S$GLB, | Performed by: FAMILY MEDICINE

## 2023-05-29 PROCEDURE — 3008F BODY MASS INDEX DOCD: CPT | Mod: HCNC,CPTII,S$GLB, | Performed by: FAMILY MEDICINE

## 2023-05-29 PROCEDURE — 3008F PR BODY MASS INDEX (BMI) DOCUMENTED: ICD-10-PCS | Mod: HCNC,CPTII,S$GLB, | Performed by: FAMILY MEDICINE

## 2023-05-29 PROCEDURE — 3074F PR MOST RECENT SYSTOLIC BLOOD PRESSURE < 130 MM HG: ICD-10-PCS | Mod: HCNC,CPTII,S$GLB, | Performed by: FAMILY MEDICINE

## 2023-05-29 PROCEDURE — 3078F PR MOST RECENT DIASTOLIC BLOOD PRESSURE < 80 MM HG: ICD-10-PCS | Mod: HCNC,CPTII,S$GLB, | Performed by: FAMILY MEDICINE

## 2023-05-29 PROCEDURE — 3288F PR FALLS RISK ASSESSMENT DOCUMENTED: ICD-10-PCS | Mod: HCNC,CPTII,S$GLB, | Performed by: FAMILY MEDICINE

## 2023-05-29 PROCEDURE — 3044F PR MOST RECENT HEMOGLOBIN A1C LEVEL <7.0%: ICD-10-PCS | Mod: HCNC,CPTII,S$GLB, | Performed by: FAMILY MEDICINE

## 2023-05-29 PROCEDURE — 3074F SYST BP LT 130 MM HG: CPT | Mod: HCNC,CPTII,S$GLB, | Performed by: FAMILY MEDICINE

## 2023-05-29 PROCEDURE — 99999 PR PBB SHADOW E&M-EST. PATIENT-LVL IV: CPT | Mod: PBBFAC,HCNC,, | Performed by: FAMILY MEDICINE

## 2023-05-29 PROCEDURE — 1101F PT FALLS ASSESS-DOCD LE1/YR: CPT | Mod: HCNC,CPTII,S$GLB, | Performed by: FAMILY MEDICINE

## 2023-05-29 PROCEDURE — 1160F RVW MEDS BY RX/DR IN RCRD: CPT | Mod: HCNC,CPTII,S$GLB, | Performed by: FAMILY MEDICINE

## 2023-05-29 PROCEDURE — 4010F PR ACE/ARB THEARPY RXD/TAKEN: ICD-10-PCS | Mod: HCNC,CPTII,S$GLB, | Performed by: FAMILY MEDICINE

## 2023-05-29 PROCEDURE — 1160F PR REVIEW ALL MEDS BY PRESCRIBER/CLIN PHARMACIST DOCUMENTED: ICD-10-PCS | Mod: HCNC,CPTII,S$GLB, | Performed by: FAMILY MEDICINE

## 2023-05-29 PROCEDURE — 3078F DIAST BP <80 MM HG: CPT | Mod: HCNC,CPTII,S$GLB, | Performed by: FAMILY MEDICINE

## 2023-05-29 PROCEDURE — 3044F HG A1C LEVEL LT 7.0%: CPT | Mod: HCNC,CPTII,S$GLB, | Performed by: FAMILY MEDICINE

## 2023-05-29 PROCEDURE — 3288F FALL RISK ASSESSMENT DOCD: CPT | Mod: HCNC,CPTII,S$GLB, | Performed by: FAMILY MEDICINE

## 2023-05-29 PROCEDURE — 99215 OFFICE O/P EST HI 40 MIN: CPT | Mod: HCNC,S$GLB,, | Performed by: FAMILY MEDICINE

## 2023-05-29 PROCEDURE — 1125F PR PAIN SEVERITY QUANTIFIED, PAIN PRESENT: ICD-10-PCS | Mod: HCNC,CPTII,S$GLB, | Performed by: FAMILY MEDICINE

## 2023-05-29 RX ORDER — ALPRAZOLAM 0.5 MG/1
TABLET ORAL
Qty: 90 TABLET | Refills: 2 | Status: SHIPPED | OUTPATIENT
Start: 2023-05-29 | End: 2023-08-29 | Stop reason: SDUPTHER

## 2023-05-29 RX ORDER — ZOLPIDEM TARTRATE 10 MG/1
10 TABLET ORAL NIGHTLY
Qty: 30 TABLET | Refills: 2 | Status: SHIPPED | OUTPATIENT
Start: 2023-05-29 | End: 2023-08-29 | Stop reason: SDUPTHER

## 2023-05-29 NOTE — PROGRESS NOTES
"  Assessment & Plan  Problem List Items Addressed This Visit          Psychiatric    Anxiety    Relevant Medications    ALPRAZolam (XANAX) 0.5 MG tablet       Cardiac/Vascular    Essential hypertension (Chronic)    Current Assessment & Plan     Pt is currently stable on medication regimen.  Continue current therapy as scheduled.  Contact office with any questions about adjustments on medications.            Atherosclerosis of aorta - Primary    Overview     "The aorta is of normal caliber and tapers appropriately, demonstrating mild calcified atheromatous disease" CT Abdomen 7-            Endocrine    Controlled type 2 diabetes mellitus without complication, without long-term current use of insulin    Current Assessment & Plan     Patient is stable.  Assess and addressed all modifiable risk factors.  Continue with appropriate management to prevent complications.            Other Visit Diagnoses       Transient insomnia        Relevant Medications    zolpidem (AMBIEN) 10 mg Tab           I have discussed the common side effects of this medication with the patient and answered all of the questions they had at the time of this visit regarding this medication.      Health Maintenance reviewed.    Follow-up: No follow-ups on file.    ______________________________________________________________________    Chief Complaint  No chief complaint on file.      HPI  Eyad Rodriguez is a 68 y.o. male with multiple medical diagnoses as listed in the medical history and problem list that presents for medication refill.  Pt is known to me with last appointment 11/23/2022.    Patient denies any new symptoms including chest pain, SOB, blurry vision, N/V, diarrhea.  Diabetes: The patient reports that they  check blood sugars at home on a regular basis.  Blood sugar results are generally in an acceptable range (> 70 and <150). The patient  denies any problems with low blood sugars. The patient  reports that they have been " complaint with current treatment plan (diet, exercise, medication).  Patient does report recent issues with weight loss.  Patient reports that it is intentional to some degree.  He is monitoring his diet and he has been very active.  He has lost weight secondary to his knee pain.      PAST MEDICAL HISTORY:  Past Medical History:   Diagnosis Date    Angina pectoris     Anxiety     BPH (benign prostatic hypertrophy)     Chronic low back pain     followed by pain management    Disorder of kidney and ureter     DJD (degenerative joint disease) of knee     Heart failure     Hemorrhoid     HTN (hypertension)     Insomnia     Obesity     Personal history of kidney stones     Tobacco abuse     Type 2 diabetes mellitus        PAST SURGICAL HISTORY:  Past Surgical History:   Procedure Laterality Date    cyst removed from back      TOTAL KNEE ARTHROPLASTY Left 4/5/2023    Procedure: ARTHROPLASTY, KNEE, TOTAL. HUGO;  Surgeon: Shane Arreola MD;  Location: [facility];  Service: Orthopedics;  Laterality: Left;  Same Day  JENNI DE LA CRUZ [phone] TEXTED DOROTHY ON @ 2/28/23 10:34AM HE REPLIED ON 2/28/23 @ 10:34AM -[facility]  1ST ASSIST MUKUL [phone] TEXTED MUKUL ON 2/28/23 @ 10:37AM. SHE REPLIED ON 2/28/23 @ 10:37AM -[facility]  RN PREOP 03/22/2023, TYPE & SCREEN, --FREDDIE       SOCIAL HISTORY:  Social History     Socioeconomic History    Marital status:     Number of children: 1   Occupational History    Occupation:    Tobacco Use    Smoking status: Former     Types: Cigars    Smokeless tobacco: Current    Tobacco comments:     quit 10 yrs ago   Substance and Sexual Activity    Alcohol use: No     Alcohol/week: 1.7 standard drinks     Types: 2 Standard drinks or equivalent per week    Drug use: No    Sexual activity: Not Currently       FAMILY HISTORY:  Family History   Problem Relation Age of Onset    Hypertension Mother     Anxiety disorder Mother     Diverticulosis Mother     Irritable bowel syndrome Mother      Cataracts Mother     Hypertension Father     Anxiety disorder Father     Anxiety disorder Sister     Colon polyps Brother     Colon cancer Neg Hx     Cirrhosis Neg Hx     Liver cancer Neg Hx     Celiac disease Neg Hx     Crohn's disease Neg Hx     Ulcerative colitis Neg Hx     Esophageal cancer Neg Hx     Stomach cancer Neg Hx     Rectal cancer Neg Hx        ALLERGIES AND MEDICATIONS: updated and reviewed.  Review of patient's allergies indicates:   Allergen Reactions    Cyclobenzaprine Hives and Other (See Comments)     Other reaction(s): Nausea     Current Outpatient Medications   Medication Sig Dispense Refill    acetaminophen (TYLENOL) 500 MG tablet Take 2 tablets (1,000 mg total) by mouth every 8 (eight) hours as needed for Pain. 42 tablet 0    amLODIPine (NORVASC) 10 MG tablet Take 1 tablet (10 mg total) by mouth once daily. 90 tablet 3    docusate sodium (COLACE) 100 MG capsule Take 1 capsule (100 mg total) by mouth 2 (two) times daily. 30 capsule 0    flash glucose scanning reader (FREESTYLE BRIAN 10 DAY READER) Misc 1 Units by Misc.(Non-Drug; Combo Route) route 3 (three) times daily with meals. 1 each 0    flash glucose sensor (FREESTYLE BRIAN 10 DAY SENSOR) Kit 1 Units by Misc.(Non-Drug; Combo Route) route 3 (three) times daily with meals. 1 kit 11    lancets [facility] To check BG 3 times daily, to use with insurance preferred meter 300 each 3    lisinopriL (PRINIVIL,ZESTRIL) 40 MG tablet Take 1 tablet (40 mg total) by mouth once daily. 90 tablet 1    metoprolol succinate (TOPROL-XL) 25 MG 24 hr tablet Take 1 tablet (25 mg total) by mouth once daily. 90 tablet 3    oxyCODONE (ROXICODONE) 5 MG immediate release tablet Take 1-2 tablets (5-10 mg total) by mouth every 6 (six) hours as needed for Pain. 30 tablet 0    TRUE METRIX AIR GLUCOSE METER Misc use to test sugars THREE TIMES A DAY      TRUE METRIX GLUCOSE TEST STRIP Strp use to test blood sugars THREE TIMES A  strip 0    TRUEPLUS LANCETS 30 gauge Misc  "use to test sugars THREE TIMES A  each 2    ALPRAZolam (XANAX) 0.5 MG tablet TAKE 1 TABLET BY MOUTH THREE TIMES A DAY AS NEEDED FOR anxiety 90 tablet 2    alprostadil (MUSE) 250 MCG pellet 1 each (250 mcg total) by Transurethral route as needed for Erectile Dysfunction. use no more than 3 times per week 6 each 1    aspirin (ECOTRIN) 81 MG EC tablet Take 1 tablet (81 mg total) by mouth 2 (two) times a day. 60 tablet 0    celecoxib (CELEBREX) 200 MG capsule Take 1 capsule (200 mg total) by mouth 2 (two) times daily. (Patient not taking: Reported on 5/29/2023) 14 capsule 0    pen needle, diabetic 32 gauge x 5/32" Ndle 1 Units by Misc.(Non-Drug; Combo Route) route every evening. 200 each 3    pregabalin (LYRICA) 75 MG capsule Take 1 capsule (75 mg total) by mouth 2 (two) times daily. for 14 days (Patient not taking: Reported on 5/29/2023) 28 capsule 0    zolpidem (AMBIEN) 10 mg Tab Take 1 tablet (10 mg total) by mouth every evening. 30 tablet 2     No current facility-administered medications for this visit.         ROS  Review of Systems   Constitutional:  Negative for activity change, appetite change, fatigue, fever and unexpected weight change.   HENT:  Negative for congestion and facial swelling.    Eyes:  Negative for visual disturbance.   Respiratory:  Negative for chest tightness, shortness of breath, wheezing and stridor.    Cardiovascular:  Negative for chest pain, palpitations and leg swelling.   Gastrointestinal:  Negative for abdominal distention, abdominal pain, constipation, diarrhea, nausea and vomiting.   Endocrine: Negative for cold intolerance, heat intolerance, polydipsia and polyuria.   Musculoskeletal:  Positive for arthralgias and gait problem (improving).   Skin: Negative.    Allergic/Immunologic: Negative.    Neurological:  Negative for dizziness, light-headedness, numbness and headaches.   Psychiatric/Behavioral:  Negative for agitation and decreased concentration.          Physical " "Exam  Vitals:    05/29/23 0751   BP: 128/60   Pulse: 70   Temp: 98.1 °F (36.7 °C)   TempSrc: Oral   SpO2: 95%   Weight: 102.5 kg (225 lb 15.5 oz)   Height: 5' 9" (1.753 m)    Body mass index is 33.37 kg/m².  Weight: 102.5 kg (225 lb 15.5 oz)   Height: 5' 9" (175.3 cm)   Physical Exam  Vitals reviewed.   Constitutional:       Appearance: Normal appearance. He is well-developed.   HENT:      Head: Normocephalic and atraumatic.      Right Ear: External ear normal.      Left Ear: External ear normal.      Nose: Nose normal.   Eyes:      Extraocular Movements: Extraocular movements intact.      Conjunctiva/sclera: Conjunctivae normal.      Pupils: Pupils are equal, round, and reactive to light.   Cardiovascular:      Rate and Rhythm: Normal rate and regular rhythm.      Heart sounds: Normal heart sounds.   Pulmonary:      Effort: Pulmonary effort is normal.      Breath sounds: Normal breath sounds.   Musculoskeletal:      Cervical back: Normal range of motion.   Skin:     General: Skin is warm and dry.   Neurological:      Mental Status: He is alert and oriented to person, place, and time.   Psychiatric:         Behavior: Behavior normal.         Health Maintenance         Date Due Completion Date    Sign Pain Contract Never done ---    Complete Opioid Risk Tool Never done ---    High Dose Statin Never done ---    Colorectal Cancer Screening Never done ---    Shingles Vaccine (1 of 2) Never done ---    Lipid Panel 06/27/2020 6/27/2019    Foot Exam 05/21/2021 5/21/2020    Pneumococcal Vaccines (Age 65+) (3 - PPSV23 if available, else PCV20) 05/18/2022 10/8/2015    Abdominal Aortic Aneurysm Screening 05/18/2022 7/15/2015    COVID-19 Vaccine (4 - Moderna series) 01/18/2023 11/23/2022    Diabetes Urine Screening 02/28/2023 2/28/2022    Hemoglobin A1c 09/22/2023 3/22/2023    Eye Exam 04/18/2024 4/18/2023    TETANUS VACCINE 09/25/2029 9/25/2019                Patient note was created using MModal.  Any errors in syntax or even " information may not have been identified and edited on initial review prior to signing this note.

## 2023-05-30 ENCOUNTER — DOCUMENTATION ONLY (OUTPATIENT)
Dept: REHABILITATION | Facility: HOSPITAL | Age: 68
End: 2023-05-30

## 2023-05-30 ENCOUNTER — CLINICAL SUPPORT (OUTPATIENT)
Dept: REHABILITATION | Facility: HOSPITAL | Age: 68
End: 2023-05-30
Attending: ORTHOPAEDIC SURGERY
Payer: MEDICARE

## 2023-05-30 DIAGNOSIS — M25.662 DECREASED ROM OF LEFT KNEE: Primary | ICD-10-CM

## 2023-05-30 DIAGNOSIS — Z74.09 DECREASED MOBILITY AND ENDURANCE: ICD-10-CM

## 2023-05-30 DIAGNOSIS — R29.898 DECREASED STRENGTH OF LOWER EXTREMITY: ICD-10-CM

## 2023-05-30 PROCEDURE — 97140 MANUAL THERAPY 1/> REGIONS: CPT | Mod: HCNC,PN

## 2023-05-30 PROCEDURE — 97110 THERAPEUTIC EXERCISES: CPT | Mod: HCNC,PN

## 2023-05-30 PROCEDURE — 97530 THERAPEUTIC ACTIVITIES: CPT | Mod: HCNC,PN

## 2023-05-30 NOTE — PROGRESS NOTES
[facility]   Physical Therapy Treatment Note     Name: Eyad Rodriguez  Clinic Number: [id number]    Therapy Diagnosis:   Encounter Diagnoses   Name Primary?    Decreased ROM of left knee Yes    Decreased strength of lower extremity     Decreased mobility and endurance          Physician: Shane Arreola MD    Visit Date: 5/30/2023    Physician Orders: PT Eval and Treat   Medical Diagnosis from Referral: Z96.652 (ICD-10-CM) - Status post left knee replacement  Evaluation Date: 5/1/2023  Authorization Period Expiration: 04/11/2024  Plan of Care Expiration: 8/1/23  Progress Note Due: 6/30/23  Visit # / Visits authorized: 8/20 (+eval)  FOTO: 1/3     Precautions: Standard, Diabetes, and Hx of heart failure, anxiety,      PTA Visit #: 0/5     Time In: 1047a  Time Out: 1143  Total Billable Time: 56 minutes 1:1    SUBJECTIVE     Pt reports: He worked a lot of functions this weekend. He is sore today. He took pain medicine before he came today. He feels good but still sore in his legs.   He was compliant with home exercise program.  Response to previous treatment: sore  Functional change: ongoing    Pain: 5/10  Location: left knee      OBJECTIVE     Objective Measures updated at progress report unless specified.     5/30/23:  Knee extension Left 0 degrees   Knee flexion 105 deg; 112 deg after manual     Treatment     Eyad received the treatments listed below:      therapeutic exercises to develop strength, endurance, and ROM for 23  minutes including:  Heel prop 5# above and below 5'   Straight leg raise 3x10     Single leg shuttle 2 black 3x10 Left   upright bike 8' resist 4.0 (NT)  +matrix knee extension 10lb 3x10  +matrix knee flexion 15# 3x10     manual therapy techniques: Joint mobilizations and Soft tissue Mobilization were applied to the: L knee for 10 minutes, including:  Overpressure flexion and extension holds  Patellar mobs superior<>inferior  Tibial femoral joint mobs or knee  "flexion grade ii-iii      neuromuscular re-education activities to improve: Coordination, Proprioception, and Posture for 5 minutes. The following activities were included:    Short arc quad 5" 3x10 w/ +5#      therapeutic activities activities to improve: functional independence for 18 minutes. The following activities were included:  Sit to stand TKE purple sport cord 3x10  Step ups 6" stair 2x10 Left   Side step ups 6"  2x10 Left   +TRX squat 3x10       Patient Education and Home Exercises     Home Exercises Provided and Patient Education Provided     Education provided:   - Home exercise program         Written Home Exercises Provided: yes. Exercises were reviewed and Eyad was able to demonstrate them prior to the end of the session.  Eyad demonstrated good  understanding of the education provided. See EMR under Patient Instructions for exercises provided during therapy sessions.       ASSESSMENT     Patient displayed improvements in range of motion with manual and exercises. PT progressed today to continue to improve strength and range of motion. Patient was appropriately fatigued with all exercises and was encouraged to take breaks between each set. PT to reassess at next visit with functional tests.     Eyad Is progressing well towards his goals.   Pt prognosis is Good.     Pt will continue to benefit from skilled outpatient physical therapy to address the deficits listed in the problem list box on initial evaluation, provide pt/family education and to maximize pt's level of independence in the home and community environment.     Pt's spiritual, cultural and educational needs considered and pt agreeable to plan of care and goals.     Anticipated barriers to physical therapy: co-morbidities     Goals: Short Term GOALS: 4 weeks. Pt agrees with goals set.  1. Patient demonstrates independence with HEP. met  2.Patient will demonstrate 0 degrees of knee extension to improve gait mechanics met  3. Patient " demonstrates ability to perform Timed up and Go without AD in 12 seconds or less to improve mobility and decrease risk for falls.  Progressing, not met  4. Patient demonstrates ability to perform 17 sit to stand with 30 second sit to stand test to improve functional strength and mobility. Progressing, not met     Long Term GOALS: 8 weeks. Pt agrees with goals set.  1. Patient demonstrates increased Left knee flexion  to 115 degrees to improve tolerance to functional activities pain free.  Progressing, not met  2. Patient demonstrates increased strength BLE's to 4+/5 or greater to improve tolerance to functional activities pain free. Progressing, not met  3. Patient demonstrates improved overall function per FOTO Knee Survey to 20% Limitation or less. Progressing, not met  4. Patient will be able to walk 1 mile with little to no difficulty to improve tolerance to functional tasks pain free. Progressing, not met    PLAN     Increased knee mobility for functional use and ambulation without AD. Increase lower extremity strength and stability.     Janette Lange, PT,DPT  05/30/2023

## 2023-05-30 NOTE — PROGRESS NOTES
Physical therapist and physical therapy assistant(s) met face to face to discuss patient's treatment plan and progress towards established goals. Pt will be seen by a physical therapist minimally every 6th visit or every 30 days.    Janette Lange, PT

## 2023-06-01 ENCOUNTER — CLINICAL SUPPORT (OUTPATIENT)
Dept: REHABILITATION | Facility: HOSPITAL | Age: 68
End: 2023-06-01
Attending: ORTHOPAEDIC SURGERY
Payer: MEDICARE

## 2023-06-01 DIAGNOSIS — Z74.09 DECREASED MOBILITY AND ENDURANCE: ICD-10-CM

## 2023-06-01 DIAGNOSIS — R29.898 DECREASED STRENGTH OF LOWER EXTREMITY: ICD-10-CM

## 2023-06-01 DIAGNOSIS — M25.662 DECREASED ROM OF LEFT KNEE: Primary | ICD-10-CM

## 2023-06-01 PROCEDURE — 97110 THERAPEUTIC EXERCISES: CPT | Mod: HCNC,PN

## 2023-06-01 PROCEDURE — 97530 THERAPEUTIC ACTIVITIES: CPT | Mod: HCNC,PN

## 2023-06-01 NOTE — PROGRESS NOTES
[facility]   Physical Therapy Treatment Note     Name: Eyad Rodriguez  Clinic Number: [id number]    Therapy Diagnosis:   Encounter Diagnoses   Name Primary?    Decreased ROM of left knee Yes    Decreased strength of lower extremity     Decreased mobility and endurance        Physician: Shane Arreola MD    Visit Date: 6/1/2023    Physician Orders: PT Eval and Treat   Medical Diagnosis from Referral: Z96.652 (ICD-10-CM) - Status post left knee replacement  Evaluation Date: 5/1/2023  Authorization Period Expiration: 04/11/2024  Plan of Care Expiration: 8/1/23  Progress Note Due: 7/1/23  Visit # / Visits authorized: 9/20 (+eval)  FOTO: 1/3     Precautions: Standard, Diabetes, and Hx of heart failure, anxiety,      PTA Visit #: 0/5     Time In: 1040a  Time Out: 1146  Total Billable Time: 30 minutes 1:1    SUBJECTIVE     Pt reports: He worked a lot of functions this weekend. He is sore today. He took pain medicine before he came today. He feels good but still sore in his legs.   He was compliant with home exercise program.  Response to previous treatment: sore  Functional change: ongoing    Pain: 5/10  Location: left knee      OBJECTIVE     Objective Measures updated at progress report unless specified.     6/1/23:  Timed up and Go   30 sec sit to stand: 15 without use of hands  Knee extension Left 0 degrees   Knee flexion 115 deg;     CMS Impairment/Limitation/Restriction for FOTO Knee Survey    Therapist reviewed FOTO scores for Eyad Rodriguez on 6/1/2023.   FOTO documents entered into [employer] - see Media section.    Limitation Score: 49%         Treatment     Eyad received the treatments listed below:      therapeutic exercises to develop strength, endurance, and ROM for 31 minutes including:  Heel prop 5# above and below 5'   Straight leg raise 3x10     Single leg shuttle 2 black 3x10 Left   upright bike 8' resist 4.0 (NT)  matrix knee extension 10lb 3x10  matrix knee flexion 15#  "3x10     manual therapy techniques: Joint mobilizations and Soft tissue Mobilization were applied to the: L knee for 00 minutes, including:  Overpressure flexion and extension holds  Patellar mobs superior<>inferior  Tibial femoral joint mobs or knee flexion grade ii-iii      neuromuscular re-education activities to improve: Coordination, Proprioception, and Posture for 5 minutes. The following activities were included:    Short arc quad 5" 3x10 w/ +5#      therapeutic activities activities to improve: functional independence for 30 minutes. The following activities were included:  Reassessment   Sit to stand TKE purple sport cord 3x10  Step ups 6" stair 2x10 Left   Side step ups 6"  2x10 Left   TRX squat 3x10       Patient Education and Home Exercises     Home Exercises Provided and Patient Education Provided     Education provided:   - Home exercise program         Written Home Exercises Provided: yes. Exercises were reviewed and Eyad was able to demonstrate them prior to the end of the session.  Eyad demonstrated good  understanding of the education provided. See EMR under Patient Instructions for exercises provided during therapy sessions.       ASSESSMENT     Patient was reassessed and displayed improvements in knee range of motion, mobility with Timed up and Go, and functional strength with sit to stand. However, he continues to display difficulty with functional tasks as reported in FOTO. Today's FOTO score indicated more limitation as compared to initial evaluation and patient reported that he may have inaccurately filled out at initial evaluation. Patient would continue to benefit from skilled PT to improve functional strength and gait mechanics.     Eyad Is progressing well towards his goals.   Pt prognosis is Good.     Pt will continue to benefit from skilled outpatient physical therapy to address the deficits listed in the problem list box on initial evaluation, provide pt/family education and to " maximize pt's level of independence in the home and community environment.     Pt's spiritual, cultural and educational needs considered and pt agreeable to plan of care and goals.     Anticipated barriers to physical therapy: co-morbidities     Goals: Short Term GOALS: 4 weeks. Pt agrees with goals set.  1. Patient demonstrates independence with HEP. met  2.Patient will demonstrate 0 degrees of knee extension to improve gait mechanics met  3. Patient demonstrates ability to perform Timed up and Go without AD in 12 seconds or less to improve mobility and decrease risk for falls.   met  4. Patient demonstrates ability to perform 17 sit to stand with 30 second sit to stand test to improve functional strength and mobility. Progressing, not met     Long Term GOALS: 8 weeks. Pt agrees with goals set.  1. Patient demonstrates increased Left knee flexion  to 115 degrees to improve tolerance to functional activities pain free.  Progressing, not met  2. Patient demonstrates increased strength BLE's to 4+/5 or greater to improve tolerance to functional activities pain free.  met  3. Patient demonstrates improved overall function per FOTO Knee Survey to 20% Limitation or less. Progressing, not met  4. Patient will be able to walk 1 mile with little to no difficulty to improve tolerance to functional tasks pain free. Progressing, not met    PLAN     Increased knee mobility for functional use and ambulation without AD. Increase lower extremity strength and stability.     Janette Lange, PT,DPT  06/01/2023

## 2023-06-02 ENCOUNTER — PES CALL (OUTPATIENT)
Dept: ADMINISTRATIVE | Facility: CLINIC | Age: 68
End: 2023-06-02
Payer: MEDICARE

## 2023-06-05 ENCOUNTER — PATIENT OUTREACH (OUTPATIENT)
Dept: ADMINISTRATIVE | Facility: HOSPITAL | Age: 68
End: 2023-06-05
Payer: MEDICARE

## 2023-06-05 RX ORDER — CEFAZOLIN SODIUM 2 G/50ML
SOLUTION INTRAVENOUS
COMMUNITY
Start: 2023-04-05

## 2023-06-05 NOTE — PROGRESS NOTES
[facility] 1 Statin Report 05.30.23 - previous history of statin therapy use, medication discontinued 03/22/2023. Message sent to Dr. Garcia for review and advice.

## 2023-06-06 ENCOUNTER — CLINICAL SUPPORT (OUTPATIENT)
Dept: REHABILITATION | Facility: HOSPITAL | Age: 68
End: 2023-06-06
Attending: ORTHOPAEDIC SURGERY
Payer: MEDICARE

## 2023-06-06 DIAGNOSIS — M25.662 DECREASED ROM OF LEFT KNEE: Primary | ICD-10-CM

## 2023-06-06 DIAGNOSIS — R29.898 DECREASED STRENGTH OF LOWER EXTREMITY: ICD-10-CM

## 2023-06-06 DIAGNOSIS — Z74.09 DECREASED MOBILITY AND ENDURANCE: ICD-10-CM

## 2023-06-06 PROCEDURE — 97110 THERAPEUTIC EXERCISES: CPT | Mod: HCNC,PN

## 2023-06-06 PROCEDURE — 97140 MANUAL THERAPY 1/> REGIONS: CPT | Mod: HCNC,PN

## 2023-06-06 PROCEDURE — 97530 THERAPEUTIC ACTIVITIES: CPT | Mod: HCNC,PN

## 2023-06-06 NOTE — PROGRESS NOTES
[facility]   Physical Therapy Treatment Note     Name: Eyad Rodriguez  Clinic Number: [id number]    Therapy Diagnosis:   Encounter Diagnoses   Name Primary?    Decreased ROM of left knee Yes    Decreased strength of lower extremity     Decreased mobility and endurance        Physician: Shane Arreola MD    Visit Date: 6/6/2023    Physician Orders: PT Eval and Treat   Medical Diagnosis from Referral: Z96.652 (ICD-10-CM) - Status post left knee replacement  Evaluation Date: 5/1/2023  Authorization Period Expiration: 04/11/2024  Plan of Care Expiration: 8/1/23  Progress Note Due: 7/1/23  Visit # / Visits authorized: 10/20 (+eval)  FOTO: 1/3     Precautions: Standard, Diabetes, and Hx of heart failure, anxiety,      PTA Visit #: 0/5     Time In: 930a  Time Out: 1030  Total Billable Time: 60 minutes 1:1    SUBJECTIVE     Pt reports: He feels that he needs more therapy because he has moderate difficulty stair climbing and descending.   Response to previous treatment: sore  Functional change: ongoing    Pain: 5/10  Location: left knee      OBJECTIVE     Objective Measures updated at progress report unless specified.     6/1/23:  Timed up and Go   30 sec sit to stand: 15 without use of hands  Knee extension Left 0 degrees   Knee flexion 115 deg;     CMS Impairment/Limitation/Restriction for FOTO Knee Survey    Therapist reviewed FOTO scores for Eyad Rodriguez on 6/6/2023.   FOTO documents entered into [employer] - see Media section.    Limitation Score: 49%         Treatment     Eyad received the treatments listed below:      therapeutic exercises to develop strength, endurance, and ROM for 30 minutes including:  Heel prop 5# above and below 5' (with heat)  Straight leg raise 3x10   upright bike 8' resist 4.0 (NT)  matrix knee extension 10lb 3x10  matrix knee flexion 15# 3x10     Not performed today:  Single leg shuttle 2 black 3x10 Left     manual therapy techniques: Joint mobilizations and  "Soft tissue Mobilization were applied to the: L knee for 15 minutes, including:  Overpressure flexion and extension holds  Patellar mobs superior<>inferior  Tibial femoral joint mobs or knee flexion grade ii-iii  Soft tissue mobilization of the quad, ITB and posterior calf  Dry needling: Medial and Lateral recess of patellar tendon, vastus lateralis insertions along lateral patella      neuromuscular re-education activities to improve: Coordination, Proprioception, and Posture for 00 minutes. The following activities were included:    Short arc quad 5" 3x10 w/ +5#      therapeutic activities activities to improve: functional independence for 15 minutes. The following activities were included:  Forward Step Downs 4" x 30  Lateral Step Downs 4" x30    Not performed today:  Reassessment   Sit to stand TKE purple sport cord 3x10  Step ups 6" stair 2x10 Left   Side step ups 6"  2x10 Left   TRX squat 3x10       Patient Education and Home Exercises     Home Exercises Provided and Patient Education Provided     Education provided:   - Home exercise program         Written Home Exercises Provided: yes. Exercises were reviewed and Eyad was able to demonstrate them prior to the end of the session.  Eyad demonstrated good  understanding of the education provided. See EMR under Patient Instructions for exercises provided during therapy sessions.       ASSESSMENT     Patient completed aggressive manual therapy to increase knee extension and flexion as he presented with moderate tissue restriction. Patient was introduced to forward and lateral step downs to focus on weakness and pain he experiences when going down the stairs. He demonstrated moderate difficulty and reported fatigue and weakness.     Eyad Is progressing well towards his goals.   Pt prognosis is Good.     Pt will continue to benefit from skilled outpatient physical therapy to address the deficits listed in the problem list box on initial evaluation, provide " pt/family education and to maximize pt's level of independence in the home and community environment.     Pt's spiritual, cultural and educational needs considered and pt agreeable to plan of care and goals.     Anticipated barriers to physical therapy: co-morbidities     Goals: Short Term GOALS: 4 weeks. Pt agrees with goals set.  1. Patient demonstrates independence with HEP. met  2.Patient will demonstrate 0 degrees of knee extension to improve gait mechanics met  3. Patient demonstrates ability to perform Timed up and Go without AD in 12 seconds or less to improve mobility and decrease risk for falls.   met  4. Patient demonstrates ability to perform 17 sit to stand with 30 second sit to stand test to improve functional strength and mobility. Progressing, not met     Long Term GOALS: 8 weeks. Pt agrees with goals set.  1. Patient demonstrates increased Left knee flexion  to 115 degrees to improve tolerance to functional activities pain free.  Progressing, not met  2. Patient demonstrates increased strength BLE's to 4+/5 or greater to improve tolerance to functional activities pain free.  met  3. Patient demonstrates improved overall function per FOTO Knee Survey to 20% Limitation or less. Progressing, not met  4. Patient will be able to walk 1 mile with little to no difficulty to improve tolerance to functional tasks pain free. Progressing, not met    PLAN     Increased knee mobility for functional use and ambulation without AD. Increase lower extremity strength and stability.     Kendal Awad, PT,DPT  06/06/2023

## 2023-06-08 ENCOUNTER — CLINICAL SUPPORT (OUTPATIENT)
Dept: REHABILITATION | Facility: HOSPITAL | Age: 68
End: 2023-06-08
Attending: ORTHOPAEDIC SURGERY
Payer: MEDICARE

## 2023-06-08 DIAGNOSIS — R29.898 DECREASED STRENGTH OF LOWER EXTREMITY: ICD-10-CM

## 2023-06-08 DIAGNOSIS — M25.662 DECREASED ROM OF LEFT KNEE: Primary | ICD-10-CM

## 2023-06-08 DIAGNOSIS — Z74.09 DECREASED MOBILITY AND ENDURANCE: ICD-10-CM

## 2023-06-08 PROCEDURE — 97110 THERAPEUTIC EXERCISES: CPT | Mod: HCNC,PN

## 2023-06-08 PROCEDURE — 97140 MANUAL THERAPY 1/> REGIONS: CPT | Mod: HCNC,PN

## 2023-06-08 NOTE — PROGRESS NOTES
[facility]   Physical Therapy Treatment Note     Name: Eyad Rodriguez  Clinic Number: [id number]    Therapy Diagnosis:   Encounter Diagnoses   Name Primary?    Decreased ROM of left knee Yes    Decreased strength of lower extremity          Physician: Shane Arreola MD    Visit Date: 6/8/2023    Physician Orders: PT Eval and Treat   Medical Diagnosis from Referral: Z96.652 (ICD-10-CM) - Status post left knee replacement  Evaluation Date: 5/1/2023  Authorization Period Expiration: 04/11/2024  Plan of Care Expiration: 8/1/23  Progress Note Due: 7/1/23  Visit # / Visits authorized: 11/20 (+eval)  FOTO: 1/3     Precautions: Standard, Diabetes, and Hx of heart failure, anxiety,      PTA Visit #: 0/5     Time In: 0815a  Time Out: 0900a  Total Billable Time: 30 minutes 1:1    SUBJECTIVE     Pt reports: He has a significant reduction of pain since last visit. He believes the dry needling helped a lot and wants to do another round, but only in the lateral region.   Response to previous treatment: sore  Functional change: ongoing    Pain: 3/10  Location: left knee      OBJECTIVE     Objective Measures updated at progress report unless specified.     6/1/23:  Timed up and Go   30 sec sit to stand: 15 without use of hands  Knee extension Left 0 degrees   Knee flexion 115 deg;     CMS Impairment/Limitation/Restriction for FOTO Knee Survey    Therapist reviewed FOTO scores for Eyad Rodriguez on 6/8/2023.   FOTO documents entered into [employer] - see Media section.    Limitation Score: 49%         Treatment     Eyad received the treatments listed below:      therapeutic exercises to develop strength, endurance, and ROM for 30 minutes including:  Heel prop 5# above and below 5' (with heat)  Straight leg raise 3x10   upright bike 8' resist 4.0 (NT)  matrix knee extension 10lb 3x10  matrix knee flexion 15# 3x10     Not performed today:  Single leg shuttle 2 black 3x10 Left     manual therapy  "techniques: Joint mobilizations and Soft tissue Mobilization were applied to the: L knee for 15 minutes, including:  Overpressure flexion and extension holds  Patellar mobs superior<>inferior  Tibial femoral joint mobs or knee flexion grade ii-iii  Soft tissue mobilization of the quad, ITB and posterior calf  Dry needling: lateral knee recess, vastus lateralis      neuromuscular re-education activities to improve: Coordination, Proprioception, and Posture for 00 minutes. The following activities were included:    Short arc quad 5" 3x10 w/ +5#      therapeutic activities activities to improve: functional independence for 00 minutes. The following activities were included:  Forward Step Downs 4" x 30  Lateral Step Downs 4" x30    Not performed today:  Reassessment   Sit to stand TKE purple sport cord 3x10  Step ups 6" stair 2x10 Left   Side step ups 6"  2x10 Left   TRX squat 3x10       Patient Education and Home Exercises     Home Exercises Provided and Patient Education Provided     Education provided:   - Home exercise program         Written Home Exercises Provided: yes. Exercises were reviewed and Eyad was able to demonstrate them prior to the end of the session.  Eyad demonstrated good  understanding of the education provided. See EMR under Patient Instructions for exercises provided during therapy sessions.       ASSESSMENT     Patient completed aggressive manual therapy to increase knee extension and flexion as he presented with moderate tissue restriction. Patient was introduced to forward and lateral step downs to focus on weakness and pain he experiences when going down the stairs. He demonstrated moderate difficulty and reported fatigue and weakness.     Eyad Is progressing well towards his goals.   Pt prognosis is Good.     Pt will continue to benefit from skilled outpatient physical therapy to address the deficits listed in the problem list box on initial evaluation, provide pt/family education and to " maximize pt's level of independence in the home and community environment.     Pt's spiritual, cultural and educational needs considered and pt agreeable to plan of care and goals.     Anticipated barriers to physical therapy: co-morbidities     Goals: Short Term GOALS: 4 weeks. Pt agrees with goals set.  1. Patient demonstrates independence with HEP. met  2.Patient will demonstrate 0 degrees of knee extension to improve gait mechanics met  3. Patient demonstrates ability to perform Timed up and Go without AD in 12 seconds or less to improve mobility and decrease risk for falls.   met  4. Patient demonstrates ability to perform 17 sit to stand with 30 second sit to stand test to improve functional strength and mobility. Progressing, not met     Long Term GOALS: 8 weeks. Pt agrees with goals set.  1. Patient demonstrates increased Left knee flexion  to 115 degrees to improve tolerance to functional activities pain free.  Progressing, not met  2. Patient demonstrates increased strength BLE's to 4+/5 or greater to improve tolerance to functional activities pain free.  met  3. Patient demonstrates improved overall function per FOTO Knee Survey to 20% Limitation or less. Progressing, not met  4. Patient will be able to walk 1 mile with little to no difficulty to improve tolerance to functional tasks pain free. Progressing, not met    PLAN     Increased knee mobility for functional use and ambulation without AD. Increase lower extremity strength and stability.     Kendal Awad, PT,DPT  06/08/2023

## 2023-06-12 DIAGNOSIS — Z96.652 STATUS POST LEFT KNEE REPLACEMENT: ICD-10-CM

## 2023-06-12 RX ORDER — OXYCODONE HYDROCHLORIDE 5 MG/1
5 TABLET ORAL EVERY 6 HOURS PRN
Qty: 30 TABLET | Refills: 0 | Status: SHIPPED | OUTPATIENT
Start: 2023-06-12

## 2023-07-28 ENCOUNTER — OFFICE VISIT (OUTPATIENT)
Dept: ORTHOPEDICS | Facility: CLINIC | Age: 68
End: 2023-07-28
Payer: MEDICARE

## 2023-07-28 VITALS — HEIGHT: 69 IN | WEIGHT: 225 LBS | BODY MASS INDEX: 33.33 KG/M2

## 2023-07-28 DIAGNOSIS — Z96.652 STATUS POST LEFT KNEE REPLACEMENT: Primary | ICD-10-CM

## 2023-07-28 DIAGNOSIS — M17.11 PRIMARY OSTEOARTHRITIS OF RIGHT KNEE: ICD-10-CM

## 2023-07-28 DIAGNOSIS — M25.561 RIGHT KNEE PAIN, UNSPECIFIED CHRONICITY: Primary | ICD-10-CM

## 2023-07-28 PROCEDURE — 99999 PR PBB SHADOW E&M-EST. PATIENT-LVL III: CPT | Mod: PBBFAC,HCNC,, | Performed by: ORTHOPAEDIC SURGERY

## 2023-07-28 PROCEDURE — 1125F AMNT PAIN NOTED PAIN PRSNT: CPT | Mod: HCNC,CPTII,S$GLB, | Performed by: ORTHOPAEDIC SURGERY

## 2023-07-28 PROCEDURE — 99213 OFFICE O/P EST LOW 20 MIN: CPT | Mod: HCNC,25,S$GLB, | Performed by: ORTHOPAEDIC SURGERY

## 2023-07-28 PROCEDURE — 20610 DRAIN/INJ JOINT/BURSA W/O US: CPT | Mod: HCNC,RT,S$GLB, | Performed by: ORTHOPAEDIC SURGERY

## 2023-07-28 PROCEDURE — 3044F PR MOST RECENT HEMOGLOBIN A1C LEVEL <7.0%: ICD-10-PCS | Mod: HCNC,CPTII,S$GLB, | Performed by: ORTHOPAEDIC SURGERY

## 2023-07-28 PROCEDURE — 3008F BODY MASS INDEX DOCD: CPT | Mod: HCNC,CPTII,S$GLB, | Performed by: ORTHOPAEDIC SURGERY

## 2023-07-28 PROCEDURE — 99213 PR OFFICE/OUTPT VISIT, EST, LEVL III, 20-29 MIN: ICD-10-PCS | Mod: HCNC,25,S$GLB, | Performed by: ORTHOPAEDIC SURGERY

## 2023-07-28 PROCEDURE — 1159F MED LIST DOCD IN RCRD: CPT | Mod: HCNC,CPTII,S$GLB, | Performed by: ORTHOPAEDIC SURGERY

## 2023-07-28 PROCEDURE — 1159F PR MEDICATION LIST DOCUMENTED IN MEDICAL RECORD: ICD-10-PCS | Mod: HCNC,CPTII,S$GLB, | Performed by: ORTHOPAEDIC SURGERY

## 2023-07-28 PROCEDURE — 3008F PR BODY MASS INDEX (BMI) DOCUMENTED: ICD-10-PCS | Mod: HCNC,CPTII,S$GLB, | Performed by: ORTHOPAEDIC SURGERY

## 2023-07-28 PROCEDURE — 3044F HG A1C LEVEL LT 7.0%: CPT | Mod: HCNC,CPTII,S$GLB, | Performed by: ORTHOPAEDIC SURGERY

## 2023-07-28 PROCEDURE — 1125F PR PAIN SEVERITY QUANTIFIED, PAIN PRESENT: ICD-10-PCS | Mod: HCNC,CPTII,S$GLB, | Performed by: ORTHOPAEDIC SURGERY

## 2023-07-28 PROCEDURE — 4010F ACE/ARB THERAPY RXD/TAKEN: CPT | Mod: HCNC,CPTII,S$GLB, | Performed by: ORTHOPAEDIC SURGERY

## 2023-07-28 PROCEDURE — 99999 PR PBB SHADOW E&M-EST. PATIENT-LVL III: ICD-10-PCS | Mod: PBBFAC,HCNC,, | Performed by: ORTHOPAEDIC SURGERY

## 2023-07-28 PROCEDURE — 20610 LARGE JOINT ASPIRATION/INJECTION: R KNEE: ICD-10-PCS | Mod: HCNC,RT,S$GLB, | Performed by: ORTHOPAEDIC SURGERY

## 2023-07-28 PROCEDURE — 4010F PR ACE/ARB THEARPY RXD/TAKEN: ICD-10-PCS | Mod: HCNC,CPTII,S$GLB, | Performed by: ORTHOPAEDIC SURGERY

## 2023-07-28 RX ORDER — TRIAMCINOLONE ACETONIDE 40 MG/ML
40 INJECTION, SUSPENSION INTRA-ARTICULAR; INTRAMUSCULAR
Status: DISCONTINUED | OUTPATIENT
Start: 2023-07-28 | End: 2023-07-28 | Stop reason: HOSPADM

## 2023-07-28 RX ADMIN — TRIAMCINOLONE ACETONIDE 40 MG: 40 INJECTION, SUSPENSION INTRA-ARTICULAR; INTRAMUSCULAR at 09:07

## 2023-07-28 NOTE — PROCEDURES
Large Joint Aspiration/Injection: R knee    Date/Time: 7/28/2023 9:00 AM  Performed by: Shane Arreola MD  Authorized by: Shane Arreola MD     Consent Done?:  Yes (Verbal)  Indications:  Arthritis and pain  Site marked: the procedure site was marked    Timeout: prior to procedure the correct patient, procedure, and site was verified    Prep: patient was prepped and draped in usual sterile fashion      Local anesthesia used?: Yes    Anesthesia:  Local infiltration  Local anesthetic:  Lidocaine 1% without epinephrine and topical anesthetic    Details:  Needle Size:  22 G  Approach:  Anterolateral  Location:  Knee  Site:  R knee  Medications:  40 mg triamcinolone acetonide 40 mg/mL  Patient tolerance:  Patient tolerated the procedure well with no immediate complications

## 2023-07-28 NOTE — PROGRESS NOTES
Ortho Knee Follow Up Note    PCP: Nafisa Garcia MD   Referring Provider: No referring provider defined for this encounter.        Assessment:  68 y.o. male status post left total Knee Primary completed on 4/5/23. Doing well. Outpatient PT completed. No issues for this knee. BUT new complaints of right knee pain. XR with moderate osteoarthritis, would like injection to this knee. Will think about surgery in next few months.      Plan:  Follow up 12 months for left, PRN for right  Future Radiographs Indicated at next visit: No    Pain Management: Continue current pain management and Wean narcotic medications as tolerated  Anticoagulation: None  Wound Care: None    Patient Reassurance:   Post-operative course discussed with patient. Patient reassured and supported. All questions answered.    ACTIVE PROBLEM LIST  Patient Active Problem List   Diagnosis    BPH (benign prostatic hypertrophy)    DJD (degenerative joint disease) of knee    Insomnia    Anxiety    Chronic low back pain    Tobacco abuse    Essential hypertension    Angina at rest    Chest pain, atypical    CHF (congestive heart failure)    Cardiomyopathy    Controlled type 2 diabetes mellitus without complication, without long-term current use of insulin    Atherosclerosis of aorta    Arthritis of left knee    Nuclear sclerosis of both eyes    Refractive error    Decreased ROM of left knee    Decreased strength of lower extremity    Decreased mobility and endurance           HPI:  Eyad Rodriguez presents today for a post-op visit.     STATUS POST:  left total Knee Primary  BMI: There is no height or weight on file to calculate BMI.    Post operative recovery was complicated by: None    Patient rates his condition as improving. Pleased with surgical outcome to date.     Functional Assessment:  Completed PT  Functional Difficulties:  Kneeling  Pain Medication:  Narcotic  Anticoagulation: None    EXAM:    left POST-OPERATIVE KNEE    There were no vitals  taken for this visit.    Skin:  Appropriate post op appearance, No evidence of erythema, warmth, discharge, or drainage, and Incision clean/dry/intact.   Range of Motion: Flexion: 120, Extension 0  Neurovascular Status: Sensation intact in Sural, Saphenous, SPN, DPN and Tibial nerve distribution. 5 out of 5 strength in hip flexion, knee flexion/extension, ankle plantarflexion/dorsiflexion. 2+ dorsalis pedis    Right knee: Medial joint line pain and tenderness. ROM 0-130. Stable to varus-valgus stress. No AP laxity. Sensation intact in Sural, Saphenous, SPN, DPN and Tibial nerve distribution. 5 out of 5 strength in hip flexion, knee flexion/extension, ankle plantarflexion/dorsiflexion. 2+ dorsalis pedis    IMAGING:  Left X-ray Knee:   Implants are well fixed and aligned. There is no evidence of loosening. Anterior lucency possibly projectional, will continue to monitor.      Right Knee X-ray:   Severe end stage osteoarthritis of right knee, medial compartment. Varus deformity.

## 2023-08-29 ENCOUNTER — OFFICE VISIT (OUTPATIENT)
Dept: FAMILY MEDICINE | Facility: CLINIC | Age: 68
End: 2023-08-29
Payer: MEDICARE

## 2023-08-29 VITALS
HEIGHT: 69 IN | OXYGEN SATURATION: 95 % | TEMPERATURE: 98 F | DIASTOLIC BLOOD PRESSURE: 68 MMHG | HEART RATE: 56 BPM | WEIGHT: 225.75 LBS | BODY MASS INDEX: 33.44 KG/M2 | SYSTOLIC BLOOD PRESSURE: 138 MMHG

## 2023-08-29 DIAGNOSIS — F51.02 TRANSIENT INSOMNIA: ICD-10-CM

## 2023-08-29 DIAGNOSIS — F41.9 ANXIETY: ICD-10-CM

## 2023-08-29 DIAGNOSIS — E11.9 CONTROLLED TYPE 2 DIABETES MELLITUS WITHOUT COMPLICATION, WITHOUT LONG-TERM CURRENT USE OF INSULIN: Primary | ICD-10-CM

## 2023-08-29 PROCEDURE — 1125F AMNT PAIN NOTED PAIN PRSNT: CPT | Mod: HCNC,CPTII,S$GLB, | Performed by: FAMILY MEDICINE

## 2023-08-29 PROCEDURE — 99215 PR OFFICE/OUTPT VISIT, EST, LEVL V, 40-54 MIN: ICD-10-PCS | Mod: HCNC,S$GLB,, | Performed by: FAMILY MEDICINE

## 2023-08-29 PROCEDURE — 3288F PR FALLS RISK ASSESSMENT DOCUMENTED: ICD-10-PCS | Mod: HCNC,CPTII,S$GLB, | Performed by: FAMILY MEDICINE

## 2023-08-29 PROCEDURE — 3075F SYST BP GE 130 - 139MM HG: CPT | Mod: HCNC,CPTII,S$GLB, | Performed by: FAMILY MEDICINE

## 2023-08-29 PROCEDURE — 3078F PR MOST RECENT DIASTOLIC BLOOD PRESSURE < 80 MM HG: ICD-10-PCS | Mod: HCNC,CPTII,S$GLB, | Performed by: FAMILY MEDICINE

## 2023-08-29 PROCEDURE — 3288F FALL RISK ASSESSMENT DOCD: CPT | Mod: HCNC,CPTII,S$GLB, | Performed by: FAMILY MEDICINE

## 2023-08-29 PROCEDURE — 3044F HG A1C LEVEL LT 7.0%: CPT | Mod: HCNC,CPTII,S$GLB, | Performed by: FAMILY MEDICINE

## 2023-08-29 PROCEDURE — 3078F DIAST BP <80 MM HG: CPT | Mod: HCNC,CPTII,S$GLB, | Performed by: FAMILY MEDICINE

## 2023-08-29 PROCEDURE — 99999 PR PBB SHADOW E&M-EST. PATIENT-LVL III: ICD-10-PCS | Mod: PBBFAC,HCNC,, | Performed by: FAMILY MEDICINE

## 2023-08-29 PROCEDURE — 4010F ACE/ARB THERAPY RXD/TAKEN: CPT | Mod: HCNC,CPTII,S$GLB, | Performed by: FAMILY MEDICINE

## 2023-08-29 PROCEDURE — 3044F PR MOST RECENT HEMOGLOBIN A1C LEVEL <7.0%: ICD-10-PCS | Mod: HCNC,CPTII,S$GLB, | Performed by: FAMILY MEDICINE

## 2023-08-29 PROCEDURE — 1101F PR PT FALLS ASSESS DOC 0-1 FALLS W/OUT INJ PAST YR: ICD-10-PCS | Mod: HCNC,CPTII,S$GLB, | Performed by: FAMILY MEDICINE

## 2023-08-29 PROCEDURE — 4010F PR ACE/ARB THEARPY RXD/TAKEN: ICD-10-PCS | Mod: HCNC,CPTII,S$GLB, | Performed by: FAMILY MEDICINE

## 2023-08-29 PROCEDURE — 1159F MED LIST DOCD IN RCRD: CPT | Mod: HCNC,CPTII,S$GLB, | Performed by: FAMILY MEDICINE

## 2023-08-29 PROCEDURE — 99215 OFFICE O/P EST HI 40 MIN: CPT | Mod: HCNC,S$GLB,, | Performed by: FAMILY MEDICINE

## 2023-08-29 PROCEDURE — 3008F BODY MASS INDEX DOCD: CPT | Mod: HCNC,CPTII,S$GLB, | Performed by: FAMILY MEDICINE

## 2023-08-29 PROCEDURE — 99999 PR PBB SHADOW E&M-EST. PATIENT-LVL III: CPT | Mod: PBBFAC,HCNC,, | Performed by: FAMILY MEDICINE

## 2023-08-29 PROCEDURE — 1125F PR PAIN SEVERITY QUANTIFIED, PAIN PRESENT: ICD-10-PCS | Mod: HCNC,CPTII,S$GLB, | Performed by: FAMILY MEDICINE

## 2023-08-29 PROCEDURE — 1101F PT FALLS ASSESS-DOCD LE1/YR: CPT | Mod: HCNC,CPTII,S$GLB, | Performed by: FAMILY MEDICINE

## 2023-08-29 PROCEDURE — 3075F PR MOST RECENT SYSTOLIC BLOOD PRESS GE 130-139MM HG: ICD-10-PCS | Mod: HCNC,CPTII,S$GLB, | Performed by: FAMILY MEDICINE

## 2023-08-29 PROCEDURE — 3008F PR BODY MASS INDEX (BMI) DOCUMENTED: ICD-10-PCS | Mod: HCNC,CPTII,S$GLB, | Performed by: FAMILY MEDICINE

## 2023-08-29 PROCEDURE — 1159F PR MEDICATION LIST DOCUMENTED IN MEDICAL RECORD: ICD-10-PCS | Mod: HCNC,CPTII,S$GLB, | Performed by: FAMILY MEDICINE

## 2023-08-29 RX ORDER — ALPRAZOLAM 0.5 MG/1
TABLET ORAL
Qty: 90 TABLET | Refills: 2 | Status: SHIPPED | OUTPATIENT
Start: 2023-08-29 | End: 2023-12-15 | Stop reason: SDUPTHER

## 2023-08-29 RX ORDER — ZOLPIDEM TARTRATE 10 MG/1
10 TABLET ORAL NIGHTLY
Qty: 30 TABLET | Refills: 2 | Status: SHIPPED | OUTPATIENT
Start: 2023-08-29 | End: 2023-12-15 | Stop reason: SDUPTHER

## 2023-08-29 NOTE — ASSESSMENT & PLAN NOTE
Patient is stable.  Assess and addressed all modifiable risk factors.  Continue with appropriate management to prevent complications.  Monitor for hypoglycemia

## 2023-08-29 NOTE — PROGRESS NOTES
Assessment & Plan  Problem List Items Addressed This Visit          Psychiatric    Anxiety    Relevant Medications    ALPRAZolam (XANAX) 0.5 MG tablet       Endocrine    Controlled type 2 diabetes mellitus without complication, without long-term current use of insulin - Primary    Current Assessment & Plan     Patient is stable.  Assess and addressed all modifiable risk factors.  Continue with appropriate management to prevent complications.  Monitor for hypoglycemia          Relevant Orders    Comprehensive Metabolic Panel    CBC Auto Differential    Hemoglobin A1C    Lipid Panel    TSH     Other Visit Diagnoses       Transient insomnia        Relevant Medications    zolpidem (AMBIEN) 10 mg Tab          Greater than 45 minutes was spent with this patient with greater than 50% spent with face-to-face counseling on above listed conditions.      Health Maintenance reviewed.    Follow-up: No follow-ups on file.    ______________________________________________________________________    Chief Complaint  No chief complaint on file.      NINFA Rodriguez is a 68 y.o. male with multiple medical diagnoses as listed in the medical history and problem list that presents for medication refill.  Pt is known to me with last appointment 5/29/2023.    Patient denies any new symptoms including chest pain, SOB, blurry vision, N/V, diarrhea.  He continues to have lower back pain.  His knee pain on the left is doing better.  He is able to ambulate better on the left side.   He reports continued lower back pain that can be sharp in nature.  He states it can worsen at night.      He reports an episode in which he almost blacked out.  He denies any SOB or chest pain.  He was able to make it to his car.  He has to lean on a cart until this sensation passed.  He reports that it has not happened again. He has lost 9 lbs sicne his last office with me.   He will be getting eye glasses today. He will also get new dentures.       PAST  MEDICAL HISTORY:  Past Medical History:   Diagnosis Date    Angina pectoris     Anxiety     BPH (benign prostatic hypertrophy)     Chronic low back pain     followed by pain management    Disorder of kidney and ureter     DJD (degenerative joint disease) of knee     Heart failure     Hemorrhoid     HTN (hypertension)     Insomnia     Obesity     Personal history of kidney stones     Tobacco abuse     Type 2 diabetes mellitus        PAST SURGICAL HISTORY:  Past Surgical History:   Procedure Laterality Date    cyst removed from back      TOTAL KNEE ARTHROPLASTY Left 4/5/2023    Procedure: ARTHROPLASTY, KNEE, TOTAL. HUGO;  Surgeon: Shane Arreola MD;  Location: [facility];  Service: Orthopedics;  Laterality: Left;  Same Day  JENNI DOROTHY [phone] TEXTED DOROTHY ON @ 2/28/23 10:34AM HE REPLIED ON 2/28/23 @ 10:34AM -[facility]  1ST ASSIST MUKUL [phone] TEXTED MUKUL ON 2/28/23 @ 10:37AM. SHE REPLIED ON 2/28/23 @ 10:37AM -[facility]  RN PREOP 03/22/2023, TYPE & SCREEN, --       SOCIAL HISTORY:  Social History     Socioeconomic History    Marital status:     Number of children: 1   Occupational History    Occupation:    Tobacco Use    Smoking status: Former     Types: Cigars    Smokeless tobacco: Current    Tobacco comments:     quit 10 yrs ago   Substance and Sexual Activity    Alcohol use: No     Alcohol/week: 1.7 standard drinks of alcohol     Types: 2 Standard drinks or equivalent per week    Drug use: No    Sexual activity: Not Currently       FAMILY HISTORY:  Family History   Problem Relation Age of Onset    Hypertension Mother     Anxiety disorder Mother     Diverticulosis Mother     Irritable bowel syndrome Mother     Cataracts Mother     Hypertension Father     Anxiety disorder Father     Anxiety disorder Sister     Colon polyps Brother     Colon cancer Neg Hx     Cirrhosis Neg Hx     Liver cancer Neg Hx     Celiac disease Neg Hx     Crohn's disease Neg Hx     Ulcerative colitis Neg Hx      Esophageal cancer Neg Hx     Stomach cancer Neg Hx     Rectal cancer Neg Hx        ALLERGIES AND MEDICATIONS: updated and reviewed.  Review of patient's allergies indicates:   Allergen Reactions    Cyclobenzaprine Hives and Other (See Comments)     Other reaction(s): Nausea     Current Outpatient Medications   Medication Sig Dispense Refill    acetaminophen (TYLENOL) 500 MG tablet Take 2 tablets (1,000 mg total) by mouth every 8 (eight) hours as needed for Pain. 42 tablet 0    amLODIPine (NORVASC) 10 MG tablet Take 1 tablet (10 mg total) by mouth once daily. 90 tablet 3    lisinopriL (PRINIVIL,ZESTRIL) 40 MG tablet Take 1 tablet (40 mg total) by mouth once daily. 90 tablet 1    metoprolol succinate (TOPROL-XL) 25 MG 24 hr tablet Take 1 tablet (25 mg total) by mouth once daily. 90 tablet 3    oxyCODONE (ROXICODONE) 5 MG immediate release tablet Take 1 tablet (5 mg total) by mouth every 6 (six) hours as needed for Pain. 30 tablet 0    TRUE METRIX GLUCOSE TEST STRIP Strp use to test blood sugars THREE TIMES A  strip 0    TRUEPLUS LANCETS 30 gauge Misc use to test sugars THREE TIMES A  each 2    ALPRAZolam (XANAX) 0.5 MG tablet TAKE 1 TABLET BY MOUTH THREE TIMES A DAY AS NEEDED FOR anxiety 90 tablet 2    alprostadil (MUSE) 250 MCG pellet 1 each (250 mcg total) by Transurethral route as needed for Erectile Dysfunction. use no more than 3 times per week 6 each 1    aspirin (ECOTRIN) 81 MG EC tablet Take 1 tablet (81 mg total) by mouth 2 (two) times a day. 60 tablet 0    ceFAZolin 2 g/50mL Dextrose IVPB (ANCEF) 2 gram/50 mL PgBk       TRUE METRIX AIR GLUCOSE METER Misc use to test sugars THREE TIMES A DAY      zolpidem (AMBIEN) 10 mg Tab Take 1 tablet (10 mg total) by mouth every evening. 30 tablet 2     No current facility-administered medications for this visit.         ROS  Review of Systems   Constitutional:  Negative for activity change, appetite change, fatigue, fever and unexpected weight change.  "  HENT:  Negative for congestion and facial swelling.    Eyes:  Negative for visual disturbance.   Respiratory:  Negative for chest tightness, shortness of breath, wheezing and stridor.    Cardiovascular:  Negative for chest pain, palpitations and leg swelling.   Gastrointestinal:  Negative for abdominal distention, abdominal pain, constipation, diarrhea, nausea and vomiting.   Endocrine: Negative for cold intolerance, heat intolerance, polydipsia and polyuria.   Musculoskeletal:  Positive for arthralgias and gait problem (improving).   Skin: Negative.    Allergic/Immunologic: Negative.    Neurological:  Negative for dizziness, light-headedness, numbness and headaches.   Psychiatric/Behavioral:  Negative for agitation and decreased concentration.            Physical Exam  Vitals:    08/29/23 0752   BP: 138/68   Pulse: (!) 56   Temp: 98.3 °F (36.8 °C)   TempSrc: Oral   SpO2: 95%   Weight: 102.4 kg (225 lb 12 oz)   Height: 5' 9" (1.753 m)    Body mass index is 33.34 kg/m².  Weight: 102.4 kg (225 lb 12 oz)   Height: 5' 9" (175.3 cm)   Physical Exam  Vitals reviewed.   Constitutional:       Appearance: Normal appearance. He is well-developed.   HENT:      Head: Normocephalic and atraumatic.      Right Ear: External ear normal.      Left Ear: External ear normal.      Nose: Nose normal.   Eyes:      Extraocular Movements: Extraocular movements intact.      Conjunctiva/sclera: Conjunctivae normal.      Pupils: Pupils are equal, round, and reactive to light.   Cardiovascular:      Rate and Rhythm: Normal rate and regular rhythm.      Heart sounds: Normal heart sounds.   Pulmonary:      Effort: Pulmonary effort is normal.      Breath sounds: Normal breath sounds.   Musculoskeletal:      Cervical back: Normal range of motion.   Skin:     General: Skin is warm and dry.   Neurological:      Mental Status: He is alert and oriented to person, place, and time.   Psychiatric:         Behavior: Behavior normal.           Health " Maintenance         Date Due Completion Date    High Dose Statin Never done ---    Colorectal Cancer Screening Never done ---    Shingles Vaccine (1 of 2) Never done ---    Lipid Panel 06/27/2020 6/27/2019    Foot Exam 05/21/2021 5/21/2020    Pneumococcal Vaccines (Age 65+) (3 - PPSV23 or PCV20) 05/18/2022 10/8/2015    Abdominal Aortic Aneurysm Screening 05/18/2022 7/15/2015    COVID-19 Vaccine (4 - Moderna series) 01/18/2023 11/23/2022    Diabetes Urine Screening 02/28/2023 2/28/2022    Influenza Vaccine (1) 09/01/2023 9/15/2022    Hemoglobin A1c 09/22/2023 3/22/2023    Eye Exam 04/18/2024 4/18/2023    TETANUS VACCINE 09/25/2029 9/25/2019                Patient note was created using [employer].  Any errors in syntax or even information may not have been identified and edited on initial review prior to signing this note.

## 2023-12-11 ENCOUNTER — LAB VISIT (OUTPATIENT)
Dept: LAB | Facility: HOSPITAL | Age: 68
End: 2023-12-11
Attending: FAMILY MEDICINE
Payer: MEDICARE

## 2023-12-11 DIAGNOSIS — E11.9 CONTROLLED TYPE 2 DIABETES MELLITUS WITHOUT COMPLICATION, WITHOUT LONG-TERM CURRENT USE OF INSULIN: ICD-10-CM

## 2023-12-11 LAB
ALBUMIN SERPL BCP-MCNC: 3.5 G/DL (ref 3.5–5.2)
ALP SERPL-CCNC: 65 U/L (ref 55–135)
ALT SERPL W/O P-5'-P-CCNC: 13 U/L (ref 10–44)
ANION GAP SERPL CALC-SCNC: 7 MMOL/L (ref 8–16)
AST SERPL-CCNC: 13 U/L (ref 10–40)
BASOPHILS # BLD AUTO: 0.08 K/UL (ref 0–0.2)
BASOPHILS NFR BLD: 1.2 % (ref 0–1.9)
BILIRUB SERPL-MCNC: 0.7 MG/DL (ref 0.1–1)
BUN SERPL-MCNC: 14 MG/DL (ref 8–23)
CALCIUM SERPL-MCNC: 8.5 MG/DL (ref 8.7–10.5)
CHLORIDE SERPL-SCNC: 104 MMOL/L (ref 95–110)
CHOLEST SERPL-MCNC: 163 MG/DL (ref 120–199)
CHOLEST/HDLC SERPL: 4.3 {RATIO} (ref 2–5)
CO2 SERPL-SCNC: 25 MMOL/L (ref 23–29)
CREAT SERPL-MCNC: 0.9 MG/DL (ref 0.5–1.4)
DIFFERENTIAL METHOD: ABNORMAL
EOSINOPHIL # BLD AUTO: 0.2 K/UL (ref 0–0.5)
EOSINOPHIL NFR BLD: 2.8 % (ref 0–8)
ERYTHROCYTE [DISTWIDTH] IN BLOOD BY AUTOMATED COUNT: 13 % (ref 11.5–14.5)
EST. GFR  (NO RACE VARIABLE): >60 ML/MIN/1.73 M^2
ESTIMATED AVG GLUCOSE: 123 MG/DL (ref 68–131)
GLUCOSE SERPL-MCNC: 242 MG/DL (ref 70–110)
HBA1C MFR BLD: 5.9 % (ref 4–5.6)
HCT VFR BLD AUTO: 43.8 % (ref 40–54)
HDLC SERPL-MCNC: 38 MG/DL (ref 40–75)
HDLC SERPL: 23.3 % (ref 20–50)
HGB BLD-MCNC: 14.6 G/DL (ref 14–18)
IMM GRANULOCYTES # BLD AUTO: 0.01 K/UL (ref 0–0.04)
IMM GRANULOCYTES NFR BLD AUTO: 0.1 % (ref 0–0.5)
LDLC SERPL CALC-MCNC: 100.6 MG/DL (ref 63–159)
LYMPHOCYTES # BLD AUTO: 1.8 K/UL (ref 1–4.8)
LYMPHOCYTES NFR BLD: 27.3 % (ref 18–48)
MCH RBC QN AUTO: 31.2 PG (ref 27–31)
MCHC RBC AUTO-ENTMCNC: 33.3 G/DL (ref 32–36)
MCV RBC AUTO: 94 FL (ref 82–98)
MONOCYTES # BLD AUTO: 0.6 K/UL (ref 0.3–1)
MONOCYTES NFR BLD: 8.4 % (ref 4–15)
NEUTROPHILS # BLD AUTO: 4 K/UL (ref 1.8–7.7)
NEUTROPHILS NFR BLD: 60.2 % (ref 38–73)
NONHDLC SERPL-MCNC: 125 MG/DL
NRBC BLD-RTO: 0 /100 WBC
PLATELET # BLD AUTO: 247 K/UL (ref 150–450)
PMV BLD AUTO: 9.7 FL (ref 9.2–12.9)
POTASSIUM SERPL-SCNC: 4.2 MMOL/L (ref 3.5–5.1)
PROT SERPL-MCNC: 6.7 G/DL (ref 6–8.4)
RBC # BLD AUTO: 4.68 M/UL (ref 4.6–6.2)
SODIUM SERPL-SCNC: 136 MMOL/L (ref 136–145)
TRIGL SERPL-MCNC: 122 MG/DL (ref 30–150)
TSH SERPL DL<=0.005 MIU/L-ACNC: 1.59 UIU/ML (ref 0.4–4)
WBC # BLD AUTO: 6.67 K/UL (ref 3.9–12.7)

## 2023-12-11 PROCEDURE — 83036 HEMOGLOBIN GLYCOSYLATED A1C: CPT | Mod: HCNC | Performed by: FAMILY MEDICINE

## 2023-12-11 PROCEDURE — 85025 COMPLETE CBC W/AUTO DIFF WBC: CPT | Mod: HCNC | Performed by: FAMILY MEDICINE

## 2023-12-11 PROCEDURE — 36415 COLL VENOUS BLD VENIPUNCTURE: CPT | Mod: HCNC,PO | Performed by: FAMILY MEDICINE

## 2023-12-11 PROCEDURE — 84443 ASSAY THYROID STIM HORMONE: CPT | Mod: HCNC | Performed by: FAMILY MEDICINE

## 2023-12-11 PROCEDURE — 80061 LIPID PANEL: CPT | Mod: HCNC | Performed by: FAMILY MEDICINE

## 2023-12-11 PROCEDURE — 80053 COMPREHEN METABOLIC PANEL: CPT | Mod: HCNC | Performed by: FAMILY MEDICINE

## 2023-12-15 ENCOUNTER — OFFICE VISIT (OUTPATIENT)
Dept: FAMILY MEDICINE | Facility: CLINIC | Age: 68
End: 2023-12-15
Payer: MEDICARE

## 2023-12-15 VITALS
DIASTOLIC BLOOD PRESSURE: 72 MMHG | OXYGEN SATURATION: 96 % | HEIGHT: 69 IN | HEART RATE: 53 BPM | SYSTOLIC BLOOD PRESSURE: 130 MMHG | WEIGHT: 230.38 LBS | BODY MASS INDEX: 34.12 KG/M2 | TEMPERATURE: 98 F

## 2023-12-15 VITALS
DIASTOLIC BLOOD PRESSURE: 64 MMHG | BODY MASS INDEX: 33.93 KG/M2 | TEMPERATURE: 99 F | SYSTOLIC BLOOD PRESSURE: 130 MMHG | WEIGHT: 229.06 LBS | OXYGEN SATURATION: 96 % | HEART RATE: 63 BPM | HEIGHT: 69 IN

## 2023-12-15 DIAGNOSIS — Z00.00 ENCOUNTER FOR PREVENTIVE HEALTH EXAMINATION: Primary | ICD-10-CM

## 2023-12-15 DIAGNOSIS — F41.9 ANXIETY: ICD-10-CM

## 2023-12-15 DIAGNOSIS — E11.9 CONTROLLED TYPE 2 DIABETES MELLITUS WITHOUT COMPLICATION, WITHOUT LONG-TERM CURRENT USE OF INSULIN: ICD-10-CM

## 2023-12-15 DIAGNOSIS — I42.9 CARDIOMYOPATHY, UNSPECIFIED TYPE: ICD-10-CM

## 2023-12-15 DIAGNOSIS — I10 ESSENTIAL HYPERTENSION: Chronic | ICD-10-CM

## 2023-12-15 DIAGNOSIS — I10 ESSENTIAL HYPERTENSION: ICD-10-CM

## 2023-12-15 DIAGNOSIS — G47.00 INSOMNIA, UNSPECIFIED TYPE: ICD-10-CM

## 2023-12-15 DIAGNOSIS — R00.1 BRADYCARDIA: ICD-10-CM

## 2023-12-15 DIAGNOSIS — H91.90 DIMINISHED HEARING, UNSPECIFIED LATERALITY: ICD-10-CM

## 2023-12-15 DIAGNOSIS — I50.9 CONGESTIVE HEART FAILURE, UNSPECIFIED HF CHRONICITY, UNSPECIFIED HEART FAILURE TYPE: ICD-10-CM

## 2023-12-15 DIAGNOSIS — I70.0 ATHEROSCLEROSIS OF AORTA: ICD-10-CM

## 2023-12-15 DIAGNOSIS — F51.02 TRANSIENT INSOMNIA: ICD-10-CM

## 2023-12-15 DIAGNOSIS — M79.641 PAIN OF RIGHT HAND: Primary | ICD-10-CM

## 2023-12-15 PROCEDURE — 1159F PR MEDICATION LIST DOCUMENTED IN MEDICAL RECORD: ICD-10-PCS | Mod: HCNC,CPTII,S$GLB, | Performed by: NURSE PRACTITIONER

## 2023-12-15 PROCEDURE — 1125F AMNT PAIN NOTED PAIN PRSNT: CPT | Mod: HCNC,CPTII,S$GLB, | Performed by: FAMILY MEDICINE

## 2023-12-15 PROCEDURE — 3075F SYST BP GE 130 - 139MM HG: CPT | Mod: HCNC,CPTII,S$GLB, | Performed by: FAMILY MEDICINE

## 2023-12-15 PROCEDURE — 1101F PT FALLS ASSESS-DOCD LE1/YR: CPT | Mod: HCNC,CPTII,S$GLB, | Performed by: FAMILY MEDICINE

## 2023-12-15 PROCEDURE — 3075F PR MOST RECENT SYSTOLIC BLOOD PRESS GE 130-139MM HG: ICD-10-PCS | Mod: HCNC,CPTII,S$GLB, | Performed by: NURSE PRACTITIONER

## 2023-12-15 PROCEDURE — 1159F MED LIST DOCD IN RCRD: CPT | Mod: HCNC,CPTII,S$GLB, | Performed by: NURSE PRACTITIONER

## 2023-12-15 PROCEDURE — 3288F PR FALLS RISK ASSESSMENT DOCUMENTED: ICD-10-PCS | Mod: HCNC,CPTII,S$GLB, | Performed by: FAMILY MEDICINE

## 2023-12-15 PROCEDURE — 99999 PR PBB SHADOW E&M-EST. PATIENT-LVL III: ICD-10-PCS | Mod: PBBFAC,HCNC,, | Performed by: FAMILY MEDICINE

## 2023-12-15 PROCEDURE — 1160F RVW MEDS BY RX/DR IN RCRD: CPT | Mod: HCNC,CPTII,S$GLB, | Performed by: NURSE PRACTITIONER

## 2023-12-15 PROCEDURE — 1170F FXNL STATUS ASSESSED: CPT | Mod: HCNC,CPTII,S$GLB, | Performed by: NURSE PRACTITIONER

## 2023-12-15 PROCEDURE — 99214 PR OFFICE/OUTPT VISIT, EST, LEVL IV, 30-39 MIN: ICD-10-PCS | Mod: HCNC,S$GLB,, | Performed by: FAMILY MEDICINE

## 2023-12-15 PROCEDURE — 3078F PR MOST RECENT DIASTOLIC BLOOD PRESSURE < 80 MM HG: ICD-10-PCS | Mod: HCNC,CPTII,S$GLB, | Performed by: FAMILY MEDICINE

## 2023-12-15 PROCEDURE — 1101F PR PT FALLS ASSESS DOC 0-1 FALLS W/OUT INJ PAST YR: ICD-10-PCS | Mod: HCNC,CPTII,S$GLB, | Performed by: FAMILY MEDICINE

## 2023-12-15 PROCEDURE — G0439 PR MEDICARE ANNUAL WELLNESS SUBSEQUENT VISIT: ICD-10-PCS | Mod: HCNC,S$GLB,, | Performed by: NURSE PRACTITIONER

## 2023-12-15 PROCEDURE — 3044F PR MOST RECENT HEMOGLOBIN A1C LEVEL <7.0%: ICD-10-PCS | Mod: HCNC,CPTII,S$GLB, | Performed by: NURSE PRACTITIONER

## 2023-12-15 PROCEDURE — 1101F PR PT FALLS ASSESS DOC 0-1 FALLS W/OUT INJ PAST YR: ICD-10-PCS | Mod: HCNC,CPTII,S$GLB, | Performed by: NURSE PRACTITIONER

## 2023-12-15 PROCEDURE — 1125F PR PAIN SEVERITY QUANTIFIED, PAIN PRESENT: ICD-10-PCS | Mod: HCNC,CPTII,S$GLB, | Performed by: FAMILY MEDICINE

## 2023-12-15 PROCEDURE — 1170F PR FUNCTIONAL STATUS ASSESSED: ICD-10-PCS | Mod: HCNC,CPTII,S$GLB, | Performed by: NURSE PRACTITIONER

## 2023-12-15 PROCEDURE — 99999 PR PBB SHADOW E&M-EST. PATIENT-LVL III: CPT | Mod: PBBFAC,HCNC,, | Performed by: FAMILY MEDICINE

## 2023-12-15 PROCEDURE — 1159F MED LIST DOCD IN RCRD: CPT | Mod: HCNC,CPTII,S$GLB, | Performed by: FAMILY MEDICINE

## 2023-12-15 PROCEDURE — 99214 OFFICE O/P EST MOD 30 MIN: CPT | Mod: HCNC,S$GLB,, | Performed by: FAMILY MEDICINE

## 2023-12-15 PROCEDURE — 3008F BODY MASS INDEX DOCD: CPT | Mod: HCNC,CPTII,S$GLB, | Performed by: FAMILY MEDICINE

## 2023-12-15 PROCEDURE — 99999 PR PBB SHADOW E&M-EST. PATIENT-LVL V: CPT | Mod: PBBFAC,HCNC,, | Performed by: NURSE PRACTITIONER

## 2023-12-15 PROCEDURE — 4010F ACE/ARB THERAPY RXD/TAKEN: CPT | Mod: HCNC,CPTII,S$GLB, | Performed by: NURSE PRACTITIONER

## 2023-12-15 PROCEDURE — 3288F FALL RISK ASSESSMENT DOCD: CPT | Mod: HCNC,CPTII,S$GLB, | Performed by: NURSE PRACTITIONER

## 2023-12-15 PROCEDURE — 1159F PR MEDICATION LIST DOCUMENTED IN MEDICAL RECORD: ICD-10-PCS | Mod: HCNC,CPTII,S$GLB, | Performed by: FAMILY MEDICINE

## 2023-12-15 PROCEDURE — 3075F PR MOST RECENT SYSTOLIC BLOOD PRESS GE 130-139MM HG: ICD-10-PCS | Mod: HCNC,CPTII,S$GLB, | Performed by: FAMILY MEDICINE

## 2023-12-15 PROCEDURE — 99999 PR PBB SHADOW E&M-EST. PATIENT-LVL V: ICD-10-PCS | Mod: PBBFAC,HCNC,, | Performed by: NURSE PRACTITIONER

## 2023-12-15 PROCEDURE — 3288F FALL RISK ASSESSMENT DOCD: CPT | Mod: HCNC,CPTII,S$GLB, | Performed by: FAMILY MEDICINE

## 2023-12-15 PROCEDURE — 3078F DIAST BP <80 MM HG: CPT | Mod: HCNC,CPTII,S$GLB, | Performed by: NURSE PRACTITIONER

## 2023-12-15 PROCEDURE — 1160F PR REVIEW ALL MEDS BY PRESCRIBER/CLIN PHARMACIST DOCUMENTED: ICD-10-PCS | Mod: HCNC,CPTII,S$GLB, | Performed by: NURSE PRACTITIONER

## 2023-12-15 PROCEDURE — 3288F PR FALLS RISK ASSESSMENT DOCUMENTED: ICD-10-PCS | Mod: HCNC,CPTII,S$GLB, | Performed by: NURSE PRACTITIONER

## 2023-12-15 PROCEDURE — 4010F PR ACE/ARB THEARPY RXD/TAKEN: ICD-10-PCS | Mod: HCNC,CPTII,S$GLB, | Performed by: NURSE PRACTITIONER

## 2023-12-15 PROCEDURE — 4010F ACE/ARB THERAPY RXD/TAKEN: CPT | Mod: HCNC,CPTII,S$GLB, | Performed by: FAMILY MEDICINE

## 2023-12-15 PROCEDURE — 3078F DIAST BP <80 MM HG: CPT | Mod: HCNC,CPTII,S$GLB, | Performed by: FAMILY MEDICINE

## 2023-12-15 PROCEDURE — 3078F PR MOST RECENT DIASTOLIC BLOOD PRESSURE < 80 MM HG: ICD-10-PCS | Mod: HCNC,CPTII,S$GLB, | Performed by: NURSE PRACTITIONER

## 2023-12-15 PROCEDURE — 3044F HG A1C LEVEL LT 7.0%: CPT | Mod: HCNC,CPTII,S$GLB, | Performed by: NURSE PRACTITIONER

## 2023-12-15 PROCEDURE — 3008F PR BODY MASS INDEX (BMI) DOCUMENTED: ICD-10-PCS | Mod: HCNC,CPTII,S$GLB, | Performed by: FAMILY MEDICINE

## 2023-12-15 PROCEDURE — 3044F HG A1C LEVEL LT 7.0%: CPT | Mod: HCNC,CPTII,S$GLB, | Performed by: FAMILY MEDICINE

## 2023-12-15 PROCEDURE — 3075F SYST BP GE 130 - 139MM HG: CPT | Mod: HCNC,CPTII,S$GLB, | Performed by: NURSE PRACTITIONER

## 2023-12-15 PROCEDURE — 4010F PR ACE/ARB THEARPY RXD/TAKEN: ICD-10-PCS | Mod: HCNC,CPTII,S$GLB, | Performed by: FAMILY MEDICINE

## 2023-12-15 PROCEDURE — 1101F PT FALLS ASSESS-DOCD LE1/YR: CPT | Mod: HCNC,CPTII,S$GLB, | Performed by: NURSE PRACTITIONER

## 2023-12-15 PROCEDURE — G0439 PPPS, SUBSEQ VISIT: HCPCS | Mod: HCNC,S$GLB,, | Performed by: NURSE PRACTITIONER

## 2023-12-15 PROCEDURE — 1160F RVW MEDS BY RX/DR IN RCRD: CPT | Mod: HCNC,CPTII,S$GLB, | Performed by: FAMILY MEDICINE

## 2023-12-15 PROCEDURE — 3044F PR MOST RECENT HEMOGLOBIN A1C LEVEL <7.0%: ICD-10-PCS | Mod: HCNC,CPTII,S$GLB, | Performed by: FAMILY MEDICINE

## 2023-12-15 PROCEDURE — 1160F PR REVIEW ALL MEDS BY PRESCRIBER/CLIN PHARMACIST DOCUMENTED: ICD-10-PCS | Mod: HCNC,CPTII,S$GLB, | Performed by: FAMILY MEDICINE

## 2023-12-15 RX ORDER — NAPROXEN 500 MG/1
500 TABLET ORAL 2 TIMES DAILY WITH MEALS
Qty: 60 TABLET | Refills: 2 | Status: SHIPPED | OUTPATIENT
Start: 2023-12-15

## 2023-12-15 RX ORDER — ALPRAZOLAM 0.5 MG/1
TABLET ORAL
Qty: 90 TABLET | Refills: 2 | Status: SHIPPED | OUTPATIENT
Start: 2023-12-15

## 2023-12-15 RX ORDER — LISINOPRIL 40 MG/1
40 TABLET ORAL DAILY
Qty: 90 TABLET | Refills: 1 | Status: SHIPPED | OUTPATIENT
Start: 2023-12-15

## 2023-12-15 RX ORDER — ZOLPIDEM TARTRATE 10 MG/1
10 TABLET ORAL NIGHTLY
Qty: 30 TABLET | Refills: 2 | Status: SHIPPED | OUTPATIENT
Start: 2023-12-15

## 2023-12-15 RX ORDER — METOPROLOL SUCCINATE 25 MG/1
25 TABLET, EXTENDED RELEASE ORAL DAILY
Qty: 90 TABLET | Refills: 3 | Status: SHIPPED | OUTPATIENT
Start: 2023-12-15

## 2023-12-15 RX ORDER — AMLODIPINE BESYLATE 10 MG/1
10 TABLET ORAL DAILY
Qty: 90 TABLET | Refills: 3 | Status: SHIPPED | OUTPATIENT
Start: 2023-12-15

## 2023-12-15 NOTE — PROGRESS NOTES
"  Eyad Rodriguez presented for a  Medicare AWV and comprehensive Health Risk Assessment today. The following components were reviewed and updated:    Medical history  Family History  Social history  Allergies and Current Medications  Health Risk Assessment  Health Maintenance  Care Team       ** See Completed Assessments for Annual Wellness Visit within the encounter summary.**       The following assessments were completed:  Living Situation  CAGE  Depression Screening  Timed Get Up and Go  Whisper Test  Cognitive Function Screening  Nutrition Screening  ADL Screening  PAQ Screening          Vitals:    12/15/23 0859   BP: 130/72   Pulse: (!) 53   Temp: 97.7 °F (36.5 °C)   TempSrc: Oral   SpO2: 96%   Weight: 104.5 kg (230 lb 6.1 oz)   Height: 5' 9" (1.753 m)     Body mass index is 34.02 kg/m².  Physical Exam  Vitals and nursing note reviewed.   Constitutional:       Appearance: Normal appearance.   Cardiovascular:      Rate and Rhythm: Bradycardia present.      Pulses: Normal pulses.      Heart sounds: Normal heart sounds.   Pulmonary:      Effort: Pulmonary effort is normal.      Breath sounds: Normal breath sounds.   Musculoskeletal:         General: Normal range of motion.   Neurological:      Mental Status: He is alert and oriented to person, place, and time.   Psychiatric:         Mood and Affect: Mood normal.         Behavior: Behavior normal.             Diagnoses and health risks identified today and associated recommendations/orders:    1. Encounter for preventive health examination  Pt was seen today for an Annual Wellness visit. Healthcare maintenance and screening recommendations were discussed and updated as indicated. Return in one year for AWV.    Review current opioid prescriptions:yes  Screen for potential Substance Use Disorders:yes  Reports no longer taking  Patient is aware of non-narcotic pain options    2. Atherosclerosis of aorta  The current medical regimen is effective;  continue present plan " and medications.    3. Congestive heart failure, unspecified HF chronicity, unspecified heart failure type  Asymptomatic. The current medical regimen is effective;  continue present plan and medications.    4. Cardiomyopathy, unspecified type  The current medical regimen is effective;  continue present plan and medications.    5. Controlled type 2 diabetes mellitus without complication, without long-term current use of insulin  The current medical regimen is effective;  continue present plan and medications.  Hemoglobin A1C   Date Value Ref Range Status   12/11/2023 5.9 (H) 4.0 - 5.6 % Final             03/22/2023 6.2 (H) 4.0 - 5.6 % Final             08/22/2022 5.9 (H) 4.0 - 5.6 % Final               6. Diminished hearing, unspecified laterality  Failed whisper test.  - Ambulatory referral/consult to ENT; Future  - Ambulatory referral/consult to Audiology; Future    7. Essential hypertension  Stable. The current medical regimen is effective;  continue present plan and medications.    8. Insomnia, unspecified type  The current medical regimen is effective;  continue present plan and medications.    9. Bradycardia  Asymptomatic. On a beta blocker. ED precautions discussed. The current medical regimen is effective;  continue present plan and medications.        Provided Eyad with a 5-10 year written screening schedule and personal prevention plan. Recommendations were developed using the USPSTF age appropriate recommendations. Education, counseling, and referrals were provided as needed. After Visit Summary printed and given to patient which includes a list of additional screenings\tests needed.    Follow up in about 1 year (around 12/15/2024).    CARA Davis  I offered to discuss advanced care planning, including how to pick a person who would make decisions for you if you were unable to make them for yourself, called a health care power of , and what kind of decisions you might make such as use of  life sustaining treatments such as ventilators and tube feeding when faced with a life limiting illness recorded on a living will that they will need to know. (How you want to be cared for as you near the end of your natural life)     X Patient is interested in learning more about how to make advanced directives.  I provided them paperwork and offered to discuss this with them.

## 2023-12-15 NOTE — PATIENT INSTRUCTIONS
Counseling and Referral of Other Preventative  (Italic type indicates deductible and co-insurance are waived)    Patient Name: Eyad Rodriguez  Today's Date: 12/15/2023    Health Maintenance       Date Due Completion Date    High Dose Statin Never done ---    Colorectal Cancer Screening Never done ---    Shingles Vaccine (1 of 2) Never done ---    RSV Vaccine (Age 60+ and Pregnant patients) (1 - 1-dose 60+ series) Never done ---    Foot Exam 05/21/2021 5/21/2020    Pneumococcal Vaccines (Age 65+) (3 - PPSV23 or PCV20) 05/18/2022 10/8/2015    Abdominal Aortic Aneurysm Screening 05/18/2022 7/15/2015    Diabetes Urine Screening 02/28/2023 2/28/2022    COVID-19 Vaccine (4 - 2023-24 season) 09/01/2023 11/23/2022    Eye Exam 04/18/2024 4/18/2023    Hemoglobin A1c 06/11/2024 12/11/2023    Lipid Panel 12/11/2024 12/11/2023    TETANUS VACCINE 09/25/2029 9/25/2019        Orders Placed This Encounter   Procedures    Ambulatory referral/consult to ENT    Ambulatory referral/consult to Audiology       The following information is provided to all patients.  This information is to help you find resources for any of the problems found today that may be affecting your health:                Living healthy guide: www.[facility].[place].gov      Understanding Diabetes: www.diabetes.org      Eating healthy: www.cdc.gov/healthyweight      CDC home safety checklist: www.cdc.gov/steadi/patient.html      Agency on Aging: www.goea.[place].gov      Alcoholics anonymous (AA): www.aa.org      Physical Activity: www.aristeo.nih.gov/jv4xeav      Tobacco use: www.quitwithusla.org

## 2023-12-15 NOTE — PROGRESS NOTES
Assessment & Plan  Problem List Items Addressed This Visit          Psychiatric    Anxiety    Relevant Medications    ALPRAZolam (XANAX) 0.5 MG tablet       Cardiac/Vascular    Essential hypertension (Chronic)    Relevant Medications    metoprolol succinate (TOPROL-XL) 25 MG 24 hr tablet    lisinopriL (PRINIVIL,ZESTRIL) 40 MG tablet    amLODIPine (NORVASC) 10 MG tablet       Endocrine    Controlled type 2 diabetes mellitus without complication, without long-term current use of insulin    Current Assessment & Plan     Patient is stable.  Assess and addressed all modifiable risk factors.  Continue with appropriate management to prevent complications.            Other Visit Diagnoses       Pain of right hand    -  Primary    Relevant Medications    naproxen (NAPROSYN) 500 MG tablet    Other Relevant Orders    Ambulatory referral/consult to Orthopedics    Transient insomnia        Relevant Medications    zolpidem (AMBIEN) 10 mg Tab        Pt is currently stable on medication regimen.  Continue current therapy as scheduled.  Contact office with any questions about adjustments on medications.         Health Maintenance reviewed.    Follow-up: No follow-ups on file.    ______________________________________________________________________    Chief Complaint  No chief complaint on file.      HPI  Eyad Rodriguez is a 68 y.o. male with multiple medical diagnoses as listed in the medical history and problem list that presents for medication refills.  Pt is known to me with last appointment 8/29/2023.    Patient denies any new symptoms including chest pain, SOB, blurry vision, N/V, diarrhea.  Recent lost his dog of over 20 years.      Diabetes: The patient reports that they  do not check blood sugars at home. The patient  denies any problems with low blood sugars. The patient  reports that they have been complaint with current treatment plan (diet, exercise, medication).  Hypertension: The patient reports that they check  their blood pressures regularly and blood pressure is generally well controlled (<= 139/89).  The patient  is not enrolled in the digital hypertension program. The patient denies  cardiac chest pain, shortness of breath, lower extremity edema, headaches, and side effects of medication. The patient reports problems with  none. The patient  has been compliant with the current medication regimen.  The patient  : tries to follow a low salt diet.    Increased stress with the [facility].  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        PAST MEDICAL HISTORY:  Past Medical History:   Diagnosis Date    Angina pectoris     Anxiety     BPH (benign prostatic hypertrophy)     Chronic low back pain     followed by pain management    Disorder of kidney and ureter     DJD (degenerative joint disease) of knee     Heart failure     Hemorrhoid     HTN (hypertension)     Insomnia     Obesity     Personal history of kidney stones     Tobacco abuse     Type 2 diabetes mellitus        PAST SURGICAL HISTORY:  Past Surgical History:   Procedure Laterality Date    cyst removed from back      TOTAL KNEE ARTHROPLASTY Left 4/5/2023    Procedure: ARTHROPLASTY, KNEE, TOTAL. HUGO;  Surgeon: Shane Arreola MD;  Location: [facility];  Service: Orthopedics;  Laterality: Left;  Same Day  JENNI DE LA CRUZ [phone] TEXTED DOROTHY ON @ 2/28/23 10:34AM HE REPLIED ON 2/28/23 @ 10:34AM -SAG  1ST ASSIST MUKUL [phone] TEXTED MUKUL ON 2/28/23 @ 10:37AM. SHE REPLIED ON 2/28/23 @ 10:37AM -SAG  RN PREOP 03/22/2023, TYPE & SCREEN, --       SOCIAL HISTORY:  Social History     Socioeconomic History    Marital status:     Number of children: 1   Occupational History    Occupation:    Tobacco Use    Smoking status: Former     Types: Cigars    Smokeless tobacco: Current    Tobacco comments:     quit 10 yrs ago   Substance and Sexual Activity    Alcohol use: No     Alcohol/week: 1.7 standard drinks of alcohol     Types: 2  Standard drinks or equivalent per week    Drug use: No    Sexual activity: Not Currently       FAMILY HISTORY:  Family History   Problem Relation Age of Onset    Hypertension Mother     Anxiety disorder Mother     Diverticulosis Mother     Irritable bowel syndrome Mother     Cataracts Mother     Hypertension Father     Anxiety disorder Father     Anxiety disorder Sister     Colon polyps Brother     Colon cancer Neg Hx     Cirrhosis Neg Hx     Liver cancer Neg Hx     Celiac disease Neg Hx     Crohn's disease Neg Hx     Ulcerative colitis Neg Hx     Esophageal cancer Neg Hx     Stomach cancer Neg Hx     Rectal cancer Neg Hx        ALLERGIES AND MEDICATIONS: updated and reviewed.  Review of patient's allergies indicates:   Allergen Reactions    Cyclobenzaprine Hives and Other (See Comments)     Other reaction(s): Nausea     Current Outpatient Medications   Medication Sig Dispense Refill    acetaminophen (TYLENOL) 500 MG tablet Take 2 tablets (1,000 mg total) by mouth every 8 (eight) hours as needed for Pain. 42 tablet 0    ceFAZolin 2 g/50mL Dextrose IVPB (ANCEF) 2 gram/50 mL PgBk       oxyCODONE (ROXICODONE) 5 MG immediate release tablet Take 1 tablet (5 mg total) by mouth every 6 (six) hours as needed for Pain. 30 tablet 0    TRUE METRIX AIR GLUCOSE METER Misc use to test sugars THREE TIMES A DAY      TRUE METRIX GLUCOSE TEST STRIP Strp use to test blood sugars THREE TIMES A  strip 0    TRUEPLUS LANCETS 30 gauge Misc use to test sugars THREE TIMES A  each 2    ALPRAZolam (XANAX) 0.5 MG tablet TAKE 1 TABLET BY MOUTH THREE TIMES A DAY AS NEEDED FOR anxiety 90 tablet 2    alprostadil (MUSE) 250 MCG pellet 1 each (250 mcg total) by Transurethral route as needed for Erectile Dysfunction. use no more than 3 times per week 6 each 1    amLODIPine (NORVASC) 10 MG tablet Take 1 tablet (10 mg total) by mouth once daily. 90 tablet 3    aspirin (ECOTRIN) 81 MG EC tablet Take 1 tablet (81 mg total) by mouth 2 (two)  "times a day. 60 tablet 0    lisinopriL (PRINIVIL,ZESTRIL) 40 MG tablet Take 1 tablet (40 mg total) by mouth once daily. 90 tablet 1    metoprolol succinate (TOPROL-XL) 25 MG 24 hr tablet Take 1 tablet (25 mg total) by mouth once daily. 90 tablet 3    naproxen (NAPROSYN) 500 MG tablet Take 1 tablet (500 mg total) by mouth 2 (two) times daily with meals. 60 tablet 2    zolpidem (AMBIEN) 10 mg Tab Take 1 tablet (10 mg total) by mouth every evening. 30 tablet 2     No current facility-administered medications for this visit.         ROS  Review of Systems   Constitutional:  Negative for activity change, appetite change, fatigue, fever and unexpected weight change.   HENT:  Negative for congestion and facial swelling.    Eyes:  Negative for visual disturbance.   Respiratory:  Negative for chest tightness, shortness of breath, wheezing and stridor.    Cardiovascular:  Negative for chest pain, palpitations and leg swelling.   Gastrointestinal:  Negative for abdominal distention, abdominal pain, constipation, diarrhea, nausea and vomiting.   Endocrine: Negative for cold intolerance, heat intolerance, polydipsia and polyuria.   Musculoskeletal:  Positive for arthralgias and gait problem (improving).   Skin: Negative.    Allergic/Immunologic: Negative.    Neurological:  Negative for dizziness, light-headedness, numbness and headaches.   Psychiatric/Behavioral:  Negative for agitation and decreased concentration.          Physical Exam  Vitals:    12/15/23 0809   BP: 130/64   Pulse: 63   Temp: 98.6 °F (37 °C)   TempSrc: Oral   SpO2: 96%   Weight: 103.9 kg (229 lb 0.9 oz)   Height: 5' 9" (1.753 m)    Body mass index is 33.83 kg/m².  Weight: 103.9 kg (229 lb 0.9 oz)   Height: 5' 9" (175.3 cm)   Physical Exam  Vitals reviewed.   Constitutional:       Appearance: Normal appearance. He is well-developed.   HENT:      Head: Normocephalic and atraumatic.      Right Ear: External ear normal.      Left Ear: External ear normal.      " Nose: Nose normal.   Eyes:      Extraocular Movements: Extraocular movements intact.      Conjunctiva/sclera: Conjunctivae normal.      Pupils: Pupils are equal, round, and reactive to light.   Cardiovascular:      Rate and Rhythm: Normal rate and regular rhythm.      Heart sounds: Normal heart sounds.   Pulmonary:      Effort: Pulmonary effort is normal.      Breath sounds: Normal breath sounds.   Musculoskeletal:      Cervical back: Normal range of motion.   Skin:     General: Skin is warm and dry.   Neurological:      Mental Status: He is alert and oriented to person, place, and time.   Psychiatric:         Behavior: Behavior normal.         Health Maintenance         Date Due Completion Date    High Dose Statin Never done ---    Colorectal Cancer Screening Never done ---    Shingles Vaccine (1 of 2) Never done ---    RSV Vaccine (Age 60+ and Pregnant patients) (1 - 1-dose 60+ series) Never done ---    Foot Exam 05/21/2021 5/21/2020    Pneumococcal Vaccines (Age 65+) (3 - PPSV23 or PCV20) 05/18/2022 10/8/2015    Abdominal Aortic Aneurysm Screening 05/18/2022 7/15/2015    Diabetes Urine Screening 02/28/2023 2/28/2022    COVID-19 Vaccine (4 - 2023-24 season) 09/01/2023 11/23/2022    Eye Exam 04/18/2024 4/18/2023    Hemoglobin A1c 06/11/2024 12/11/2023    Lipid Panel 12/11/2024 12/11/2023    TETANUS VACCINE 09/25/2029 9/25/2019                Patient note was created using [employer].  Any errors in syntax or even information may not have been identified and edited on initial review prior to signing this note.

## 2023-12-21 ENCOUNTER — TELEPHONE (OUTPATIENT)
Dept: FAMILY MEDICINE | Facility: CLINIC | Age: 68
End: 2023-12-21
Payer: MEDICARE

## 2023-12-21 NOTE — TELEPHONE ENCOUNTER
Spoke with patient in regards of a recent [facility] phone call, patient was given number to the location he was inquiring about.

## 2023-12-21 NOTE — TELEPHONE ENCOUNTER
----- Message from Maritza Hernandez sent at 12/21/2023  1:25 PM CST -----  Regarding: Return Call  .Type:  Patient Returning Call    Who Called: Self     Who Left Message for Patient: not sure     Does the patient know what this is regarding?: yes- x ray for hand     Would the patient rather a call back or a response via [facility]? Call     Best Call Back Number: [phone]

## 2024-01-11 ENCOUNTER — PATIENT OUTREACH (OUTPATIENT)
Dept: ADMINISTRATIVE | Facility: HOSPITAL | Age: 69
End: 2024-01-11
Payer: MEDICARE

## 2024-01-11 NOTE — PROGRESS NOTES
Population Health Chart Review & Patient Outreach Details      Further Action Needed If Patient Returns Outreach:     [employer] Statin Attestation report: Pt is not currently on a statin        Updates Requested / Reviewed:     [x]  Care Everywhere    [x]     []  External Sources ([facility], [facility], [facility], etc.)    [] [facility]   [] [facility]   [] Other:    []  Care Team Updated   []  Removed  or Duplicate Orders   []  Immunization Reconciliation Completed / Queried    [] [place]   [] [place]   [] [place]   [] [place]      Health Maintenance Topics Addressed and Outreach Outcomes / Actions Taken:             Breast Cancer Screening []  Mammogram Order Placed    []  Mammogram Screening Scheduled    []  External Records Requested & Care Team Updated if Applicable    []  External Records Uploaded & Care Team Updated if Applicable    []  Pt Declined Scheduling Mammogram    []  Pt Will Schedule with External Provider / Order Routed & Care Team Updated if Applicable              Cervical Cancer Screening []  Pap Smear Scheduled in Primary Care or OBGYN    []  External Records Requested & Care Team Updated if Applicable       []  External Records Uploaded, Care Team Updated, & History Updated if Applicable    []  Patient Declined Scheduling Pap Smear    []  Patient Will Schedule with External Provider & Care Team Updated if Applicable                  Colorectal Cancer Screening []  Colonoscopy Case Request / Referral / Home Test Order Placed    []  External Records Requested & Care Team Updated if Applicable    []  External Records Uploaded, Care Team Updated, & History Updated if Applicable    []  Patient Declined Completing Colon Cancer Screening    []  Patient Will Schedule with External Provider & Care Team Updated if Applicable    []  Fit Kit Mailed (add the SmartPhrase under additional notes)    []  Reminded Patient to Complete Home Test                Diabetic Eye Exam []  Eye Exam Screening Order Placed     []  Eye Camera Scheduled or Optometry/Ophthalmology Referral Placed    []  External Records Requested & Care Team Updated if Applicable    []  External Records Uploaded, Care Team Updated, & History Updated if Applicable    []  Patient Declined Scheduling Eye Exam    []  Patient Will Schedule with External Provider & Care Team Updated if Applicable             Blood Pressure Control []  Primary Care Follow Up Visit Scheduled     []  Remote Blood Pressure Reading Captured    []  Patient Declined Remote Reading or Scheduling Appt - Escalated to PCP    []  Patient Will Call Back or Send Portal Message with Reading                 HbA1c & Other Labs []  Overdue Lab(s) Ordered    []  Overdue Lab(s) Scheduled    []  External Records Uploaded & Care Team Updated if Applicable    []  Primary Care Follow Up Visit Scheduled     []  Reminded Patient to Complete A1c Home Test    []  Patient Declined Scheduling Labs or Will Call Back to Schedule    []  Patient Will Schedule with External Provider / Order Routed, & Care Team Updated if Applicable           Primary Care Appointment []  Primary Care Appt Scheduled    []  Patient Declined Scheduling or Will Call Back to Schedule    []  Pt Established with External Provider, Updated Care Team, & Informed Pt to Notify Payor if Applicable           Medication Adherence /    Statin Use []  Primary Care Appointment Scheduled    []  Patient Reminded to  Prescription    []  Patient Declined, Provider Notified if Needed    []  Sent Provider Message to Review to Evaluate Pt for Statin, Add Exclusion Dx Codes, Document   Exclusion in Problem List, Change Statin Intensity Level to Moderate or High Intensity if Applicable                Osteoporosis Screening []  Dexa Order Placed    []  Dexa Appointment Scheduled    []  External Records Requested & Care Team Updated    []  External Records Uploaded, Care Team Updated, & History Updated if Applicable    []  Patient Declined Scheduling  Dexa or Will Call Back to Schedule    []  Patient Will Schedule with External Provider / Order Routed & Care Team Updated if Applicable       Additional Notes:    NO OUTREACH DONE THIS ENCOUNTER

## 2024-01-23 DIAGNOSIS — M79.641 PAIN IN BOTH HANDS: Primary | ICD-10-CM

## 2024-01-23 DIAGNOSIS — M79.642 PAIN IN BOTH HANDS: Primary | ICD-10-CM

## 2024-01-25 ENCOUNTER — OFFICE VISIT (OUTPATIENT)
Dept: ORTHOPEDICS | Facility: CLINIC | Age: 69
End: 2024-01-25
Payer: MEDICARE

## 2024-01-25 DIAGNOSIS — M79.641 PAIN OF RIGHT HAND: ICD-10-CM

## 2024-01-25 PROCEDURE — 3288F FALL RISK ASSESSMENT DOCD: CPT | Mod: HCNC,CPTII,S$GLB, | Performed by: ORTHOPAEDIC SURGERY

## 2024-01-25 PROCEDURE — 1101F PT FALLS ASSESS-DOCD LE1/YR: CPT | Mod: HCNC,CPTII,S$GLB, | Performed by: ORTHOPAEDIC SURGERY

## 2024-01-25 PROCEDURE — 1125F AMNT PAIN NOTED PAIN PRSNT: CPT | Mod: HCNC,CPTII,S$GLB, | Performed by: ORTHOPAEDIC SURGERY

## 2024-01-25 PROCEDURE — 3072F LOW RISK FOR RETINOPATHY: CPT | Mod: HCNC,CPTII,S$GLB, | Performed by: ORTHOPAEDIC SURGERY

## 2024-01-25 PROCEDURE — 99213 OFFICE O/P EST LOW 20 MIN: CPT | Mod: HCNC,S$GLB,, | Performed by: ORTHOPAEDIC SURGERY

## 2024-01-25 PROCEDURE — 99999 PR PBB SHADOW E&M-EST. PATIENT-LVL III: CPT | Mod: PBBFAC,HCNC,, | Performed by: ORTHOPAEDIC SURGERY

## 2024-01-25 PROCEDURE — 1159F MED LIST DOCD IN RCRD: CPT | Mod: HCNC,CPTII,S$GLB, | Performed by: ORTHOPAEDIC SURGERY

## 2024-01-25 PROCEDURE — 1160F RVW MEDS BY RX/DR IN RCRD: CPT | Mod: HCNC,CPTII,S$GLB, | Performed by: ORTHOPAEDIC SURGERY

## 2024-01-25 NOTE — PROGRESS NOTES
Assessment: 68 y.o. male with Bilateral CMC OA    I explained my diagnostic impression and the reasoning behind it in detail, using layman's terms.      Plan:   - Discussed bracing, injections. Declined injection today, would like to attempt bracing first   - Return to clinic PRN    All questions were answered in detail. The patient is in full agreement with the treatment plan and will proceed accordingly.    Chief Complaint   Patient presents with    Right Hand - Pain, Numbness, Swelling       Initial visit (1/25/24): Eyad Rodriguez is a 68 y.o. male who presents today complaining of Pain, Numbness, and Swelling of the Right Hand     Duration of symptoms:  over 1 year   Trauma or new activity: no  Localizes pain to base of thumb/radial wrist   Pain is constant  Aggravating factors: , motion of the hand and wrist, writing   Relieving factors: Some relief with thumb spica brace  Radicular symptoms: no    Associated symptoms:  swelling.    Prior treatment:  bracing, topical creams  without improvement in pain.   Has not tried injections  Pain does interfere with activities of daily living .      This patient was seen in consultation at the request of Dr. Nafisa Garcia    This is the extent of the patient's complaints at this time.     Hand dominance: Right     Occupation: Retired    Review of patient's allergies indicates:   Allergen Reactions    Cyclobenzaprine Hives and Other (See Comments)     Other reaction(s): Nausea         Current Outpatient Medications:     acetaminophen (TYLENOL) 500 MG tablet, Take 2 tablets (1,000 mg total) by mouth every 8 (eight) hours as needed for Pain., Disp: 42 tablet, Rfl: 0    ALPRAZolam (XANAX) 0.5 MG tablet, TAKE 1 TABLET BY MOUTH THREE TIMES A DAY AS NEEDED FOR anxiety, Disp: 90 tablet, Rfl: 2    amLODIPine (NORVASC) 10 MG tablet, Take 1 tablet (10 mg total) by mouth once daily., Disp: 90 tablet, Rfl: 3    ceFAZolin 2 g/50mL Dextrose IVPB (ANCEF) 2 gram/50 mL PgBk, ,  Disp: , Rfl:     lisinopriL (PRINIVIL,ZESTRIL) 40 MG tablet, Take 1 tablet (40 mg total) by mouth once daily., Disp: 90 tablet, Rfl: 1    metoprolol succinate (TOPROL-XL) 25 MG 24 hr tablet, Take 1 tablet (25 mg total) by mouth once daily., Disp: 90 tablet, Rfl: 3    TRUE METRIX AIR GLUCOSE METER Misc, use to test sugars THREE TIMES A DAY, Disp: , Rfl:     TRUE METRIX GLUCOSE TEST STRIP Strp, use to test blood sugars THREE TIMES A DAY, Disp: 300 strip, Rfl: 0    TRUEPLUS LANCETS 30 gauge Misc, use to test sugars THREE TIMES A DAY, Disp: 300 each, Rfl: 2    zolpidem (AMBIEN) 10 mg Tab, Take 1 tablet (10 mg total) by mouth every evening., Disp: 30 tablet, Rfl: 2    alprostadil (MUSE) 250 MCG pellet, 1 each (250 mcg total) by Transurethral route as needed for Erectile Dysfunction. use no more than 3 times per week, Disp: 6 each, Rfl: 1    aspirin (ECOTRIN) 81 MG EC tablet, Take 1 tablet (81 mg total) by mouth 2 (two) times a day., Disp: 60 tablet, Rfl: 0    naproxen (NAPROSYN) 500 MG tablet, Take 1 tablet (500 mg total) by mouth 2 (two) times daily with meals. (Patient not taking: Reported on 12/15/2023), Disp: 60 tablet, Rfl: 2    oxyCODONE (ROXICODONE) 5 MG immediate release tablet, Take 1 tablet (5 mg total) by mouth every 6 (six) hours as needed for Pain. (Patient not taking: Reported on 12/15/2023), Disp: 30 tablet, Rfl: 0    Physical Exam:   Vitals:    01/25/24 0934   PainSc: 10-Worst pain ever   PainLoc: Hand       General:  Patient is alert, awake and oriented to time, place and person. Mood and affect are appropriate.  Patient does not appear to be in any distress, denies any constitutional symptoms and appears stated age.   HEENT:  Pupils are equal and round, sclera are not injected. External examination of ears and nose reveals no abnormalities. Cranial nerves II-X are grossly intact  Skin:  no rashes, abrasions or open wounds on the affected extremity   Resp:  No respiratory distress or audible wheezing  "  CV: 2+  pulses, all extremities warm and well perfused   Bilateral Hand/Wrist Examination:    Observation/Inspection:  Swelling  none    Deformity  none  Discoloration  none     Scars   none    Atrophy  none    HAND/WRIST EXAMINATION:  Finkelstein's Test   Neg  WHAT Test    Neg  CMC grind    Pos  Circumduction test   Pos    Neurovascular Exam:  Digits WWP, brisk CR < 3s throughout  NVI motor/LTS to M/R/U nerves, radial pulse 2+  Tinel's Test - Carpal Tunnel  Neg  Tinel's Test - Cubital Tunnel  Neg  Phalen's Test    Neg  Median Nerve Compression Test Neg    ROM hand/wrist/elbow full, painless      Imaging: 3 views of the bilateral hands show degenerative changes to the bilateral CMC joints, more advanced on R than L     I personally reviewed and interpreted the patient's imaging obtained today in clinic     A note notifying Dr. Nafisa Garcia of my findings was sent via the electronic medical record     This note was created by combination of typed  and M-Modal dictation. Transcription and phonetic errors may be present.  If there are any questions, please contact me.    Past Medical History:   Diagnosis Date    Angina pectoris     Anxiety     BPH (benign prostatic hypertrophy)     Chronic low back pain     followed by pain management    Disorder of kidney and ureter     DJD (degenerative joint disease) of knee     Heart failure     Hemorrhoid     HTN (hypertension)     Insomnia     Obesity     Personal history of kidney stones     Tobacco abuse     Type 2 diabetes mellitus        Active Problem List with Overview Notes    Diagnosis Date Noted    Bradycardia 12/15/2023    Decreased ROM of left knee 05/01/2023    Decreased strength of lower extremity 05/01/2023    Decreased mobility and endurance 05/01/2023    Nuclear sclerosis of both eyes 04/18/2023    Refractive error 04/18/2023    Arthritis of left knee 04/04/2023    Atherosclerosis of aorta 05/21/2020     "The aorta is of normal caliber and tapers " "appropriately, demonstrating mild calcified atheromatous disease" CT Abdomen 7-        Controlled type 2 diabetes mellitus without complication, without long-term current use of insulin 05/23/2017    Chest pain, atypical 03/12/2015    CHF (congestive heart failure) 03/12/2015    Cardiomyopathy 03/12/2015    Angina at rest 02/26/2015    Essential hypertension 04/12/2013    Tobacco abuse     Chronic low back pain      followed by pain management        BPH (benign prostatic hypertrophy)      Dx updated per 2019 IMO Load        DJD (degenerative joint disease) of knee     Insomnia     Anxiety        Past Surgical History:   Procedure Laterality Date    cyst removed from back      TOTAL KNEE ARTHROPLASTY Left 4/5/2023    Procedure: ARTHROPLASTY, KNEE, TOTAL. HUGO;  Surgeon: Shane Arreola MD;  Location: [facility] OR;  Service: Orthopedics;  Laterality: Left;  Same Day  JENNI DE LA CRUZ [phone] TEXTED DOROTHY ON @ 2/28/23 10:34AM HE REPLIED ON 2/28/23 @ 10:34AM -SAG  1ST ASSIST MUKUL [phone] TEXTED MUKUL ON 2/28/23 @ 10:37AM. SHE REPLIED ON 2/28/23 @ 10:37AM -SAG  RN PREOP 03/22/2023, TYPE & SCREEN, --JM       Family History   Problem Relation Age of Onset    Hypertension Mother     Anxiety disorder Mother     Diverticulosis Mother     Irritable bowel syndrome Mother     Cataracts Mother     Hypertension Father     Anxiety disorder Father     Anxiety disorder Sister     Colon polyps Brother     Colon cancer Neg Hx     Cirrhosis Neg Hx     Liver cancer Neg Hx     Celiac disease Neg Hx     Crohn's disease Neg Hx     Ulcerative colitis Neg Hx     Esophageal cancer Neg Hx     Stomach cancer Neg Hx     Rectal cancer Neg Hx           "

## 2024-04-03 DIAGNOSIS — F51.02 TRANSIENT INSOMNIA: ICD-10-CM

## 2024-04-03 NOTE — TELEPHONE ENCOUNTER
No care due was identified.  [facility] Embedded Care Due Messages. Reference number: [id number].   4/03/2024 10:17:24 AM CDT

## 2024-04-04 DIAGNOSIS — F41.9 ANXIETY: ICD-10-CM

## 2024-04-04 RX ORDER — ALPRAZOLAM 0.5 MG/1
TABLET ORAL
Qty: 90 TABLET | Refills: 2 | Status: SHIPPED | OUTPATIENT
Start: 2024-04-04

## 2024-04-04 RX ORDER — ZOLPIDEM TARTRATE 10 MG/1
10 TABLET ORAL NIGHTLY
Qty: 30 TABLET | Refills: 2 | Status: SHIPPED | OUTPATIENT
Start: 2024-04-04

## 2024-04-04 NOTE — TELEPHONE ENCOUNTER
No care due was identified.  [facility] Embedded Care Due Messages. Reference number: [id number].   4/04/2024 9:46:31 AM CDT

## 2024-04-15 ENCOUNTER — TELEPHONE (OUTPATIENT)
Dept: FAMILY MEDICINE | Facility: CLINIC | Age: 69
End: 2024-04-15
Payer: MEDICARE

## 2024-04-15 NOTE — TELEPHONE ENCOUNTER
----- Message from Santa Ordonez sent at 4/15/2024  2:06 PM CDT -----  Regarding: self  Type:  Sooner Appointment Request     Patient is requesting a sooner appointment.  Patient declined first available appointment listed as well as another facility and provider .  Patient will not accept being placed on the waitlist and is requesting a message be sent to doctor.     Name of Caller:self     When is the first available appointment?aug 9     Symptoms:follow appt was missed on 4/15 and the pt doesn't want to see anyone but Dr Garcia     Would the patient rather a call back or a response via [facility]?     Best Call Back Number: [phone]       Additional Information: pt is stating he will be out of medication soon, he did allow me to schedule him with first available but really doesn't want it

## 2024-04-15 NOTE — TELEPHONE ENCOUNTER
----- Message from Santa Ordonez sent at 4/15/2024  3:28 PM CDT -----  Regarding: self  Type:  Patient Returning Call     Who Called:self     Who Left Message for Patient:Esther Reyna RN     Does the patient know what this is regarding?: unknown     Would the patient rather a call back or a response via [facility]?  Call back     Best Call Back Number: [phone]       Additional Information:     Thank you.

## 2024-05-03 DIAGNOSIS — M79.641 PAIN OF RIGHT HAND: ICD-10-CM

## 2024-05-03 NOTE — TELEPHONE ENCOUNTER
No care due was identified.  [facility] Embedded Care Due Messages. Reference number: [id number].   5/03/2024 8:39:34 AM CDT

## 2024-05-06 RX ORDER — NAPROXEN 500 MG/1
TABLET ORAL
Qty: 60 TABLET | Refills: 2 | Status: SHIPPED | OUTPATIENT
Start: 2024-05-06

## 2024-06-19 DIAGNOSIS — E11.9 TYPE 2 DIABETES MELLITUS WITHOUT COMPLICATION: ICD-10-CM

## 2024-07-03 DIAGNOSIS — E11.9 TYPE 2 DIABETES MELLITUS WITHOUT COMPLICATION: ICD-10-CM

## 2024-07-10 DIAGNOSIS — F41.9 ANXIETY: ICD-10-CM

## 2024-07-10 DIAGNOSIS — F51.02 TRANSIENT INSOMNIA: ICD-10-CM

## 2024-07-10 RX ORDER — ZOLPIDEM TARTRATE 10 MG/1
10 TABLET ORAL NIGHTLY
Qty: 30 TABLET | Refills: 0 | Status: SHIPPED | OUTPATIENT
Start: 2024-07-10

## 2024-07-10 RX ORDER — ALPRAZOLAM 0.5 MG/1
TABLET ORAL
Qty: 90 TABLET | Refills: 0 | Status: SHIPPED | OUTPATIENT
Start: 2024-07-10

## 2024-07-10 NOTE — TELEPHONE ENCOUNTER
----- Message from Fox Mai sent at 7/10/2024 12:16 PM CDT -----  Regarding: Refill on meds  Patient states he been trying to call about getting his anxiety and sleep meds refill. He states his pharmacy [facility] has been trying to call this office for his refill.

## 2024-07-10 NOTE — TELEPHONE ENCOUNTER
No care due was identified.  [facility] Embedded Care Due Messages. Reference number: [id number].   7/10/2024 2:19:17 PM CDT

## 2024-08-06 DIAGNOSIS — F41.9 ANXIETY: ICD-10-CM

## 2024-08-06 DIAGNOSIS — F51.02 TRANSIENT INSOMNIA: ICD-10-CM

## 2024-08-08 RX ORDER — ZOLPIDEM TARTRATE 10 MG/1
10 TABLET ORAL NIGHTLY
Qty: 30 TABLET | Refills: 0 | Status: SHIPPED | OUTPATIENT
Start: 2024-08-08

## 2024-08-08 RX ORDER — ALPRAZOLAM 0.5 MG/1
TABLET ORAL
Qty: 60 TABLET | Refills: 0 | Status: SHIPPED | OUTPATIENT
Start: 2024-08-08

## 2024-08-19 NOTE — TELEPHONE ENCOUNTER
----- Message from Anna Marie Atkinson sent at 12/30/2019  4:53 PM CST -----  Contact: Dr. Manley ED doctor [phone]  Type: Patient Call Back    Who called:Dr. Manley ED doctor    What is the request in detail: calling to get patient scheduled sometime this week    Can the clinic reply by [facility]? Call back    Would the patient rather a call back or a response via [facility]? Call back    Best call back number:[phone]           [Negative] : Genitourinary [FreeTextEntry4] : Seasonal allergies [FreeTextEntry5] : Pacemaker [FreeTextEntry7] : NKDA [FreeTextEntry8] : Genetic predisposition to CRC [de-identified] : Arthritis [de-identified] : Scleroderma [de-identified] : Thyroid nodules [FreeTextEntry1] : Increased risk of breast cancer

## 2024-08-20 ENCOUNTER — OFFICE VISIT (OUTPATIENT)
Dept: FAMILY MEDICINE | Facility: CLINIC | Age: 69
End: 2024-08-20
Payer: MEDICARE

## 2024-08-20 ENCOUNTER — LAB VISIT (OUTPATIENT)
Dept: LAB | Facility: HOSPITAL | Age: 69
End: 2024-08-20
Attending: FAMILY MEDICINE
Payer: MEDICARE

## 2024-08-20 VITALS
HEART RATE: 64 BPM | BODY MASS INDEX: 35.4 KG/M2 | WEIGHT: 239 LBS | TEMPERATURE: 97 F | DIASTOLIC BLOOD PRESSURE: 80 MMHG | SYSTOLIC BLOOD PRESSURE: 138 MMHG | OXYGEN SATURATION: 95 % | HEIGHT: 69 IN

## 2024-08-20 DIAGNOSIS — I70.0 ATHEROSCLEROSIS OF AORTA: ICD-10-CM

## 2024-08-20 DIAGNOSIS — F51.02 TRANSIENT INSOMNIA: ICD-10-CM

## 2024-08-20 DIAGNOSIS — E11.9 CONTROLLED TYPE 2 DIABETES MELLITUS WITHOUT COMPLICATION, WITHOUT LONG-TERM CURRENT USE OF INSULIN: ICD-10-CM

## 2024-08-20 DIAGNOSIS — I10 ESSENTIAL HYPERTENSION: Primary | Chronic | ICD-10-CM

## 2024-08-20 DIAGNOSIS — Z12.13 ENCOUNTER FOR SCREENING FOR MALIGNANT NEOPLASM OF SMALL INTESTINE: ICD-10-CM

## 2024-08-20 DIAGNOSIS — E11.36 TYPE 2 DIABETES MELLITUS WITH DIABETIC CATARACT, WITHOUT LONG-TERM CURRENT USE OF INSULIN: ICD-10-CM

## 2024-08-20 DIAGNOSIS — F41.9 ANXIETY: ICD-10-CM

## 2024-08-20 DIAGNOSIS — E66.01 SEVERE OBESITY (BMI 35.0-39.9) WITH COMORBIDITY: ICD-10-CM

## 2024-08-20 DIAGNOSIS — I20.89 ANGINA AT REST: ICD-10-CM

## 2024-08-20 DIAGNOSIS — Z23 NEED FOR VACCINATION WITH 20-POLYVALENT PNEUMOCOCCAL CONJUGATE VACCINE: ICD-10-CM

## 2024-08-20 LAB
ALBUMIN SERPL BCP-MCNC: 3.7 G/DL (ref 3.5–5.2)
ALP SERPL-CCNC: 79 U/L (ref 55–135)
ALT SERPL W/O P-5'-P-CCNC: 19 U/L (ref 10–44)
ANION GAP SERPL CALC-SCNC: 8 MMOL/L (ref 8–16)
AST SERPL-CCNC: 17 U/L (ref 10–40)
BILIRUB SERPL-MCNC: 0.7 MG/DL (ref 0.1–1)
BUN SERPL-MCNC: 11 MG/DL (ref 8–23)
CALCIUM SERPL-MCNC: 9.2 MG/DL (ref 8.7–10.5)
CHLORIDE SERPL-SCNC: 103 MMOL/L (ref 95–110)
CO2 SERPL-SCNC: 23 MMOL/L (ref 23–29)
CREAT SERPL-MCNC: 1.1 MG/DL (ref 0.5–1.4)
EST. GFR  (NO RACE VARIABLE): >60 ML/MIN/1.73 M^2
ESTIMATED AVG GLUCOSE: 134 MG/DL (ref 68–131)
GLUCOSE SERPL-MCNC: 183 MG/DL (ref 70–110)
HBA1C MFR BLD: 6.3 % (ref 4–5.6)
POTASSIUM SERPL-SCNC: 3.8 MMOL/L (ref 3.5–5.1)
PROT SERPL-MCNC: 7.4 G/DL (ref 6–8.4)
SODIUM SERPL-SCNC: 134 MMOL/L (ref 136–145)

## 2024-08-20 PROCEDURE — 3075F SYST BP GE 130 - 139MM HG: CPT | Mod: HCNC,CPTII,S$GLB, | Performed by: FAMILY MEDICINE

## 2024-08-20 PROCEDURE — 1125F AMNT PAIN NOTED PAIN PRSNT: CPT | Mod: HCNC,CPTII,S$GLB, | Performed by: FAMILY MEDICINE

## 2024-08-20 PROCEDURE — 3079F DIAST BP 80-89 MM HG: CPT | Mod: HCNC,CPTII,S$GLB, | Performed by: FAMILY MEDICINE

## 2024-08-20 PROCEDURE — 1159F MED LIST DOCD IN RCRD: CPT | Mod: HCNC,CPTII,S$GLB, | Performed by: FAMILY MEDICINE

## 2024-08-20 PROCEDURE — 3072F LOW RISK FOR RETINOPATHY: CPT | Mod: HCNC,CPTII,S$GLB, | Performed by: FAMILY MEDICINE

## 2024-08-20 PROCEDURE — 80053 COMPREHEN METABOLIC PANEL: CPT | Mod: HCNC | Performed by: FAMILY MEDICINE

## 2024-08-20 PROCEDURE — 36415 COLL VENOUS BLD VENIPUNCTURE: CPT | Mod: HCNC,PO | Performed by: FAMILY MEDICINE

## 2024-08-20 PROCEDURE — 3008F BODY MASS INDEX DOCD: CPT | Mod: HCNC,CPTII,S$GLB, | Performed by: FAMILY MEDICINE

## 2024-08-20 PROCEDURE — 1101F PT FALLS ASSESS-DOCD LE1/YR: CPT | Mod: HCNC,CPTII,S$GLB, | Performed by: FAMILY MEDICINE

## 2024-08-20 PROCEDURE — 3288F FALL RISK ASSESSMENT DOCD: CPT | Mod: HCNC,CPTII,S$GLB, | Performed by: FAMILY MEDICINE

## 2024-08-20 PROCEDURE — 99999 PR PBB SHADOW E&M-EST. PATIENT-LVL IV: CPT | Mod: PBBFAC,HCNC,, | Performed by: FAMILY MEDICINE

## 2024-08-20 PROCEDURE — 99214 OFFICE O/P EST MOD 30 MIN: CPT | Mod: HCNC,S$GLB,, | Performed by: FAMILY MEDICINE

## 2024-08-20 PROCEDURE — 3044F HG A1C LEVEL LT 7.0%: CPT | Mod: HCNC,CPTII,S$GLB, | Performed by: FAMILY MEDICINE

## 2024-08-20 PROCEDURE — 1160F RVW MEDS BY RX/DR IN RCRD: CPT | Mod: HCNC,CPTII,S$GLB, | Performed by: FAMILY MEDICINE

## 2024-08-20 PROCEDURE — 83036 HEMOGLOBIN GLYCOSYLATED A1C: CPT | Mod: HCNC | Performed by: FAMILY MEDICINE

## 2024-08-20 PROCEDURE — 4010F ACE/ARB THERAPY RXD/TAKEN: CPT | Mod: HCNC,CPTII,S$GLB, | Performed by: FAMILY MEDICINE

## 2024-08-20 RX ORDER — PNEUMOCOCCAL 20-VALENT CONJUGATE VACCINE 2.2; 2.2; 2.2; 2.2; 2.2; 2.2; 2.2; 2.2; 2.2; 2.2; 2.2; 2.2; 2.2; 2.2; 2.2; 2.2; 4.4; 2.2; 2.2; 2.2 UG/.5ML; UG/.5ML; UG/.5ML; UG/.5ML; UG/.5ML; UG/.5ML; UG/.5ML; UG/.5ML; UG/.5ML; UG/.5ML; UG/.5ML; UG/.5ML; UG/.5ML; UG/.5ML; UG/.5ML; UG/.5ML; UG/.5ML; UG/.5ML; UG/.5ML; UG/.5ML
0.5 INJECTION, SUSPENSION INTRAMUSCULAR ONCE
Qty: 0.5 ML | Refills: 0 | Status: SHIPPED | OUTPATIENT
Start: 2024-08-20 | End: 2024-08-20

## 2024-08-20 RX ORDER — ZOLPIDEM TARTRATE 10 MG/1
10 TABLET ORAL NIGHTLY
Qty: 30 TABLET | Refills: 4 | Status: SHIPPED | OUTPATIENT
Start: 2024-08-20

## 2024-08-20 RX ORDER — ALPRAZOLAM 0.5 MG/1
TABLET ORAL
Qty: 90 TABLET | Refills: 3 | Status: SHIPPED | OUTPATIENT
Start: 2024-08-20

## 2024-08-20 NOTE — PROGRESS NOTES
"  Assessment & Plan  Problem List Items Addressed This Visit          Psychiatric    Anxiety    Relevant Medications    ALPRAZolam (XANAX) 0.5 MG tablet       Cardiac/Vascular    Essential hypertension - Primary (Chronic)    Current Assessment & Plan     Continue toprol, lisinopril, and amlodipine  Bp can be better controlled.          Angina at rest    Atherosclerosis of aorta    Overview     "The aorta is of normal caliber and tapers appropriately, demonstrating mild calcified atheromatous disease" CT Abdomen 7-         Current Assessment & Plan     Noted.  Optimized             Endocrine    Type 2 diabetes mellitus with diabetic cataract, without long-term current use of insulin    Current Assessment & Plan     Patient is stable.  Assess and addressed all modifiable risk factors.  Continue with appropriate management to prevent complications.  Well controlled.  Diet controlled at this time            Severe obesity (BMI 35.0-39.9) with comorbidity    Current Assessment & Plan     The patient is asked to make an attempt to improve diet and exercise patterns to aid in medical management of this problem.            Other Visit Diagnoses       Encounter for screening for malignant neoplasm of small intestine        Relevant Orders    Ambulatory referral/consult to Endo Procedure     Need for vaccination with 20-polyvalent pneumococcal conjugate vaccine        Transient insomnia        Relevant Medications    zolpidem (AMBIEN) 10 mg Tab           Assessment & Plan  1. Chest pain.  The chest pain could potentially be due to a muscle strain or related to his previous fall. He reports that the pain comes and goes, lasting about a week each time, and sometimes prevents him from lifting his arm. His last stress test in March of last year was positive, and the subsequent echocardiogram showed no abnormalities. A repeat stress test will be ordered to further evaluate his condition. He is advised to monitor " the pain and report any changes or worsening symptoms.    2. Right knee pain.  He reports significant pain in his right knee, which is affecting his ability to kneel, especially since he needs to stay active for his boating activities. He is not ready for surgery at this time due to other commitments. He is advised to continue managing the pain and avoid activities that exacerbate it.    3. Diabetes Mellitus.  His blood sugars have been stable, and he has been adhering to his diet. Blood work will be ordered today to monitor his hemoglobin A1c levels. He is advised to continue his current diet and monitor his blood sugar levels regularly.    4. Health Maintenance.  A colonoscopy will be scheduled as it has been a long time since his last one. He is due for the Prevnar 20 pneumonia vaccine, which can be administered at the pharmacy. A prescription for the vaccine will be sent over.      Results        Health Maintenance reviewed.      ______________________________________________________________________    Chief Complaint  No chief complaint on file.      HPI  Eyad Rodriguez is a 69 y.o. male with multiple medical diagnoses as listed in the medical history and problem list that presents for fall and medication refills  Pt is known to me with last appointment 12/15/2023.    History of Present Illness  The patient presents for evaluation of multiple medical concerns.    He is due for a colonoscopy, as recommended by [employer]. His last colonoscopy was performed at [facility], during which he experienced bleeding. He was advised to wait a year before undergoing another one and has not had a colonoscopy since then.    He experiences severe pain on the left side of his chest, which he believes is muscular rather than cardiac in origin. The pain is so intense that it sometimes prevents him from lifting his arm. These episodes typically last about a week and recur every 4 days. He reports no associated dizziness.  He is uncertain if the pain is related to a fall he had a few months ago, where he landed hard on his left side. He did not seek medical attention after the fall and has since recovered, with only minor scratches that have healed. He is considering whether another heart examination is necessary.    He is experiencing significant issues with his right knee, which has previously undergone surgery. He recently purchased a boat and needs to be able to kneel, but finds this difficult due to his knee condition. He is making efforts to stay active and maintain his weight.    He has been managing his diabetes well through dietary control. He has an upcoming [facility] his disability percentage. He also has a psychiatric appointment scheduled. He has a history of a fractured wrist from his time in service, which was found to have significant calcium buildup during a previous examination. He is unsure if this is related to his current symptoms.    IMMUNIZATIONS  He has received Prevnar 13 and Pneumovax 23 vaccines.           PAST MEDICAL HISTORY:  Past Medical History:   Diagnosis Date    Angina pectoris     Anxiety     BPH (benign prostatic hypertrophy)     Chronic low back pain     followed by pain management    Disorder of kidney and ureter     DJD (degenerative joint disease) of knee     Heart failure     Hemorrhoid     HTN (hypertension)     Insomnia     Obesity     Personal history of kidney stones     Tobacco abuse     Type 2 diabetes mellitus        PAST SURGICAL HISTORY:  Past Surgical History:   Procedure Laterality Date    cyst removed from back      TOTAL KNEE ARTHROPLASTY Left 4/5/2023    Procedure: ARTHROPLASTY, KNEE, TOTAL. HUGO;  Surgeon: Shane Arreola MD;  Location: [facility];  Service: Orthopedics;  Laterality: Left;  Same Day  JENNI DE LA CRUZ [phone] TEXTED DOROTHY ON @ 2/28/23 10:34AM HE REPLIED ON 2/28/23 @ 10:34AM -[address] ASSIST MUKUL [phone]  TEXTED MUKUL ON 2/28/23 @ 10:37AM. SHE REPLIED ON 2/28/23 @ 10:37AM -[facility]  RN PREOP 03/22/2023, TYPE & SCREEN, --FREDDIE       SOCIAL HISTORY:  Social History     Socioeconomic History    Marital status:     Number of children: 1   Occupational History    Occupation:    Tobacco Use    Smoking status: Former     Types: Cigars     Passive exposure: Past    Smokeless tobacco: Current    Tobacco comments:     quit 10 yrs ago   Substance and Sexual Activity    Alcohol use: No     Alcohol/week: 1.7 standard drinks of alcohol     Types: 2 Standard drinks or equivalent per week    Drug use: No    Sexual activity: Not Currently       FAMILY HISTORY:  Family History   Problem Relation Name Age of Onset    Hypertension Mother      Anxiety disorder Mother      Diverticulosis Mother      Irritable bowel syndrome Mother      Cataracts Mother      Hypertension Father      Anxiety disorder Father      Anxiety disorder Sister      Colon polyps Brother Lambert     Colon cancer Neg Hx      Cirrhosis Neg Hx      Liver cancer Neg Hx      Celiac disease Neg Hx      Crohn's disease Neg Hx      Ulcerative colitis Neg Hx      Esophageal cancer Neg Hx      Stomach cancer Neg Hx      Rectal cancer Neg Hx         ALLERGIES AND MEDICATIONS: updated and reviewed.  Review of patient's allergies indicates:   Allergen Reactions    Cyclobenzaprine Hives and Other (See Comments)     Other reaction(s): Nausea     Current Outpatient Medications   Medication Sig Dispense Refill    acetaminophen (TYLENOL) 500 MG tablet Take 2 tablets (1,000 mg total) by mouth every 8 (eight) hours as needed for Pain. 42 tablet 0    amLODIPine (NORVASC) 10 MG tablet Take 1 tablet (10 mg total) by mouth once daily. 90 tablet 3    ceFAZolin 2 g/50mL Dextrose IVPB (ANCEF) 2 gram/50 mL PgBk       lisinopriL (PRINIVIL,ZESTRIL) 40 MG tablet Take 1 tablet (40 mg total) by mouth once daily. 90 tablet 1    metoprolol succinate (TOPROL-XL) 25 MG 24 hr tablet Take 1  tablet (25 mg total) by mouth once daily. 90 tablet 3    naproxen (NAPROSYN) 500 MG tablet TAKE 1 TABLET BY MOUTH TWICE A DAY with meals FOR pain 60 tablet 2    oxyCODONE (ROXICODONE) 5 MG immediate release tablet Take 1 tablet (5 mg total) by mouth every 6 (six) hours as needed for Pain. 30 tablet 0    TRUE METRIX AIR GLUCOSE METER Misc use to test sugars THREE TIMES A DAY      TRUE METRIX GLUCOSE TEST STRIP Strp use to test blood sugars THREE TIMES A  strip 0    TRUEPLUS LANCETS 30 gauge Misc use to test sugars THREE TIMES A  each 2    ALPRAZolam (XANAX) 0.5 MG tablet TAKE 1 TABLET BY MOUTH THREE TIMES A DAY AS NEEDED FOR anxiety 90 tablet 3    alprostadil (MUSE) 250 MCG pellet 1 each (250 mcg total) by Transurethral route as needed for Erectile Dysfunction. use no more than 3 times per week 6 each 1    aspirin (ECOTRIN) 81 MG EC tablet Take 1 tablet (81 mg total) by mouth 2 (two) times a day. 60 tablet 0    zolpidem (AMBIEN) 10 mg Tab Take 1 tablet (10 mg total) by mouth every evening. 30 tablet 4     No current facility-administered medications for this visit.         ROS  Review of Systems   Constitutional:  Negative for activity change, appetite change, fatigue, fever and unexpected weight change.   HENT:  Negative for congestion and facial swelling.    Eyes:  Negative for visual disturbance.   Respiratory:  Negative for chest tightness, shortness of breath, wheezing and stridor.    Cardiovascular:  Negative for chest pain, palpitations and leg swelling.   Gastrointestinal:  Negative for abdominal distention, abdominal pain, constipation, diarrhea, nausea and vomiting.   Endocrine: Negative for cold intolerance, heat intolerance, polydipsia and polyuria.   Musculoskeletal:  Positive for gait problem.   Skin: Negative.    Allergic/Immunologic: Negative.    Neurological:  Negative for dizziness, light-headedness, numbness and headaches.   Psychiatric/Behavioral:  Negative for agitation and  "decreased concentration.            Physical Exam  Vitals:    08/20/24 0818   BP: 138/80   Pulse: 64   Temp: 97.4 °F (36.3 °C)   TempSrc: Oral   SpO2: 95%   Weight: 108.4 kg (238 lb 15.7 oz)   Height: 5' 9" (1.753 m)    Body mass index is 35.29 kg/m².  Weight: 108.4 kg (238 lb 15.7 oz)   Height: 5' 9" (175.3 cm)   Physical Exam  Vitals reviewed.   Constitutional:       Appearance: Normal appearance. He is well-developed.   HENT:      Head: Normocephalic and atraumatic.      Right Ear: External ear normal.      Left Ear: External ear normal.      Nose: Nose normal.   Eyes:      Extraocular Movements: Extraocular movements intact.      Conjunctiva/sclera: Conjunctivae normal.      Pupils: Pupils are equal, round, and reactive to light.   Cardiovascular:      Rate and Rhythm: Normal rate and regular rhythm.      Heart sounds: Normal heart sounds.   Pulmonary:      Effort: Pulmonary effort is normal.      Breath sounds: Normal breath sounds.   Musculoskeletal:      Cervical back: Normal range of motion.   Skin:     General: Skin is warm and dry.   Neurological:      Mental Status: He is alert and oriented to person, place, and time.   Psychiatric:         Behavior: Behavior normal.        Physical Exam  Clear lung fields.  Normal heart sounds.         Health Maintenance         Date Due Completion Date    High Dose Statin Never done ---    Colorectal Cancer Screening Never done ---    Shingles Vaccine (1 of 2) Never done ---    RSV Vaccine (Age 60+ and Pregnant patients) (1 - 1-dose 60+ series) Never done ---    Foot Exam 05/21/2021 5/21/2020    Pneumococcal Vaccines (Age 65+) (3 of 3 - PPSV23 or PCV20) 05/18/2022 10/8/2015    Abdominal Aortic Aneurysm Screening 05/18/2022 7/15/2015    Diabetes Urine Screening 02/28/2023 2/28/2022    COVID-19 Vaccine (4 - 2023-24 season) 09/01/2023 11/23/2022    Eye Exam 04/18/2024 4/18/2023    Influenza Vaccine (1) 09/01/2024 10/11/2023    Lipid Panel 12/11/2024 12/11/2023    " Hemoglobin A1c 02/20/2025 8/20/2024    TETANUS VACCINE 09/25/2029 9/25/2019                Patient note was created using [employer].  Any errors in syntax or even information may not have been identified and edited on initial review prior to signing this note.

## 2024-08-23 PROBLEM — E11.36 TYPE 2 DIABETES MELLITUS WITH DIABETIC CATARACT, WITHOUT LONG-TERM CURRENT USE OF INSULIN: Status: ACTIVE | Noted: 2017-05-23

## 2024-08-23 NOTE — ASSESSMENT & PLAN NOTE
Patient is stable.  Assess and addressed all modifiable risk factors.  Continue with appropriate management to prevent complications.  Well controlled.  Diet controlled at this time

## 2024-10-21 ENCOUNTER — PATIENT OUTREACH (OUTPATIENT)
Dept: ADMINISTRATIVE | Facility: HOSPITAL | Age: 69
End: 2024-10-21
Payer: MEDICARE

## 2024-10-24 ENCOUNTER — TELEPHONE (OUTPATIENT)
Dept: FAMILY MEDICINE | Facility: CLINIC | Age: 69
End: 2024-10-24
Payer: MEDICARE

## 2024-10-24 DIAGNOSIS — E11.36 TYPE 2 DIABETES MELLITUS WITH DIABETIC CATARACT, WITHOUT LONG-TERM CURRENT USE OF INSULIN: ICD-10-CM

## 2024-10-24 DIAGNOSIS — E11.9 CONTROLLED TYPE 2 DIABETES MELLITUS WITHOUT COMPLICATION, WITHOUT LONG-TERM CURRENT USE OF INSULIN: Primary | ICD-10-CM

## 2024-10-24 RX ORDER — ROSUVASTATIN CALCIUM 5 MG/1
5 TABLET, COATED ORAL DAILY
Qty: 90 TABLET | Refills: 3 | Status: SHIPPED | OUTPATIENT
Start: 2024-10-24 | End: 2025-10-24

## 2024-10-24 NOTE — TELEPHONE ENCOUNTER
----- Message from Ishan Otto sent at 10/24/2024  9:53 AM CDT -----  Please consider prescribing an appropriate intensity statin.

## 2024-12-18 DIAGNOSIS — E11.9 TYPE 2 DIABETES MELLITUS WITHOUT COMPLICATION: ICD-10-CM

## 2024-12-30 DIAGNOSIS — F41.9 ANXIETY: ICD-10-CM

## 2024-12-30 NOTE — TELEPHONE ENCOUNTER
Care Due:                  Date            Visit Type   Department     Provider  --------------------------------------------------------------------------------                                [facility]                              PRIMARY      MED/ INTERNAL  Last Visit: 08-      CARE ([facility])   MED/ PEDS      Nafisa Vega                              [facility]                              PRIMARY      MED/ INTERNAL  Next Visit: 04-      CARE ([facility])   MED/ PEDS      Nafisa Vega                                                            Last  Test          Frequency    Reason                     Performed    Due Date  --------------------------------------------------------------------------------    CBC.........  12 months..  naproxen.................  12- 12-    Lipid Panel.  12 months..  rosuvastatin.............  12- 12-    [employer] Embedded Care Due Messages. Reference number: [id number].   12/30/2024 10:46:28 AM CST

## 2024-12-31 RX ORDER — ALPRAZOLAM 0.5 MG/1
TABLET ORAL
Qty: 90 TABLET | Refills: 3 | Status: SHIPPED | OUTPATIENT
Start: 2024-12-31

## 2025-01-24 DIAGNOSIS — I10 ESSENTIAL HYPERTENSION: ICD-10-CM

## 2025-01-24 DIAGNOSIS — F51.02 TRANSIENT INSOMNIA: ICD-10-CM

## 2025-01-24 RX ORDER — ZOLPIDEM TARTRATE 10 MG/1
10 TABLET ORAL NIGHTLY
Qty: 30 TABLET | Refills: 0 | Status: SHIPPED | OUTPATIENT
Start: 2025-01-24

## 2025-01-24 RX ORDER — AMLODIPINE BESYLATE 10 MG/1
TABLET ORAL
Qty: 90 TABLET | Refills: 1 | Status: SHIPPED | OUTPATIENT
Start: 2025-01-24

## 2025-01-24 RX ORDER — METOPROLOL SUCCINATE 25 MG/1
25 TABLET, EXTENDED RELEASE ORAL
Qty: 90 TABLET | Refills: 1 | Status: SHIPPED | OUTPATIENT
Start: 2025-01-24

## 2025-01-24 NOTE — TELEPHONE ENCOUNTER
No care due was identified.  [facility] Embedded Care Due Messages. Reference number: [id number].   1/24/2025 11:50:51 AM CST

## 2025-01-24 NOTE — TELEPHONE ENCOUNTER
Refill Routing Note   Medication(s) are not appropriate for processing by [facility] for the following reason(s):        Outside of protocol    [facility] action(s):  Approve  Route               Appointments  past 12m or future 3m with PCP    Date Provider   Last Visit   8/20/2024 Nafisa Garcia MD   Next Visit   4/4/2025 Nafisa Garcia MD   ED visits in past 90 days: 0        Note composed:3:35 PM 01/24/2025

## 2025-01-31 DIAGNOSIS — I10 ESSENTIAL HYPERTENSION: ICD-10-CM

## 2025-01-31 NOTE — TELEPHONE ENCOUNTER
No care due was identified.  [facility] Embedded Care Due Messages. Reference number: [id number].   1/31/2025 3:03:06 PM CST

## 2025-02-01 RX ORDER — LISINOPRIL 40 MG/1
40 TABLET ORAL DAILY
Qty: 90 TABLET | Refills: 1 | Status: SHIPPED | OUTPATIENT
Start: 2025-02-01

## 2025-02-01 NOTE — TELEPHONE ENCOUNTER
Refill Decision Note   Eyad Michael  is requesting a refill authorization.  Brief Assessment and Rationale for Refill:  Approve     Medication Therapy Plan:         Comments:     Note composed:12:10 AM 02/01/2025

## 2025-02-24 DIAGNOSIS — Z00.00 ENCOUNTER FOR MEDICARE ANNUAL WELLNESS EXAM: ICD-10-CM

## 2025-02-25 DIAGNOSIS — F51.02 TRANSIENT INSOMNIA: ICD-10-CM

## 2025-02-25 NOTE — TELEPHONE ENCOUNTER
No care due was identified.  [facility] Embedded Care Due Messages. Reference number: [id number].   2/25/2025 12:25:12 PM CST

## 2025-02-26 RX ORDER — ZOLPIDEM TARTRATE 10 MG/1
10 TABLET ORAL NIGHTLY
Qty: 30 TABLET | Refills: 0 | Status: SHIPPED | OUTPATIENT
Start: 2025-02-26

## 2025-03-27 DIAGNOSIS — F51.02 TRANSIENT INSOMNIA: ICD-10-CM

## 2025-03-27 NOTE — TELEPHONE ENCOUNTER
Care Due:                  Date            Visit Type   Department     Provider  --------------------------------------------------------------------------------                                [facility]                              PRIMARY      MED/ INTERNAL  Last Visit: 08-      CARE ([facility])   MED/ PEDS      Nafisa Vega                              [facility]                              PRIMARY      MED/ INTERNAL  Next Visit: 04-      CARE ([facility])   MED/ PEDS      Nafisa Vega                                                            Last  Test          Frequency    Reason                     Performed    Due Date  --------------------------------------------------------------------------------    CBC.........  12 months..  naproxen.................  12- 12-    Lipid Panel.  12 months..  rosuvastatin.............  12- 12-    [employer] Embedded Care Due Messages. Reference number: [id number].   3/27/2025 8:52:45 AM CDT

## 2025-03-31 RX ORDER — ZOLPIDEM TARTRATE 10 MG/1
10 TABLET ORAL NIGHTLY
Qty: 30 TABLET | Refills: 0 | Status: SHIPPED | OUTPATIENT
Start: 2025-03-31

## 2025-04-04 ENCOUNTER — APPOINTMENT (OUTPATIENT)
Dept: LAB | Facility: HOSPITAL | Age: 70
End: 2025-04-04
Attending: FAMILY MEDICINE
Payer: MEDICARE

## 2025-04-04 ENCOUNTER — OFFICE VISIT (OUTPATIENT)
Dept: FAMILY MEDICINE | Facility: CLINIC | Age: 70
End: 2025-04-04
Payer: MEDICARE

## 2025-04-04 VITALS
HEART RATE: 63 BPM | OXYGEN SATURATION: 95 % | DIASTOLIC BLOOD PRESSURE: 70 MMHG | HEIGHT: 69 IN | BODY MASS INDEX: 35.72 KG/M2 | SYSTOLIC BLOOD PRESSURE: 132 MMHG | WEIGHT: 241.19 LBS

## 2025-04-04 DIAGNOSIS — Z23 NEED FOR SHINGLES VACCINE: ICD-10-CM

## 2025-04-04 DIAGNOSIS — E11.9 CONTROLLED TYPE 2 DIABETES MELLITUS WITHOUT COMPLICATION, WITHOUT LONG-TERM CURRENT USE OF INSULIN: ICD-10-CM

## 2025-04-04 DIAGNOSIS — G44.211 INTRACTABLE EPISODIC TENSION-TYPE HEADACHE: ICD-10-CM

## 2025-04-04 DIAGNOSIS — E11.36 TYPE 2 DIABETES MELLITUS WITH DIABETIC CATARACT, WITHOUT LONG-TERM CURRENT USE OF INSULIN: Primary | ICD-10-CM

## 2025-04-04 DIAGNOSIS — Z12.13 ENCOUNTER FOR SCREENING FOR MALIGNANT NEOPLASM OF SMALL INTESTINE: ICD-10-CM

## 2025-04-04 DIAGNOSIS — M79.641 PAIN OF RIGHT HAND: ICD-10-CM

## 2025-04-04 DIAGNOSIS — E66.01 SEVERE OBESITY (BMI 35.0-39.9) WITH COMORBIDITY: ICD-10-CM

## 2025-04-04 DIAGNOSIS — I10 ESSENTIAL HYPERTENSION: ICD-10-CM

## 2025-04-04 PROCEDURE — 99999 PR PBB SHADOW E&M-EST. PATIENT-LVL IV: CPT | Mod: PBBFAC,HCNC,, | Performed by: FAMILY MEDICINE

## 2025-04-04 PROCEDURE — 3075F SYST BP GE 130 - 139MM HG: CPT | Mod: HCNC,CPTII,S$GLB, | Performed by: FAMILY MEDICINE

## 2025-04-04 PROCEDURE — 3078F DIAST BP <80 MM HG: CPT | Mod: HCNC,CPTII,S$GLB, | Performed by: FAMILY MEDICINE

## 2025-04-04 PROCEDURE — 3008F BODY MASS INDEX DOCD: CPT | Mod: HCNC,CPTII,S$GLB, | Performed by: FAMILY MEDICINE

## 2025-04-04 PROCEDURE — 4010F ACE/ARB THERAPY RXD/TAKEN: CPT | Mod: HCNC,CPTII,S$GLB, | Performed by: FAMILY MEDICINE

## 2025-04-04 PROCEDURE — 1159F MED LIST DOCD IN RCRD: CPT | Mod: HCNC,CPTII,S$GLB, | Performed by: FAMILY MEDICINE

## 2025-04-04 PROCEDURE — 3052F HG A1C>EQUAL 8.0%<EQUAL 9.0%: CPT | Mod: HCNC,CPTII,S$GLB, | Performed by: FAMILY MEDICINE

## 2025-04-04 PROCEDURE — 3066F NEPHROPATHY DOC TX: CPT | Mod: HCNC,CPTII,S$GLB, | Performed by: FAMILY MEDICINE

## 2025-04-04 PROCEDURE — 1160F RVW MEDS BY RX/DR IN RCRD: CPT | Mod: HCNC,CPTII,S$GLB, | Performed by: FAMILY MEDICINE

## 2025-04-04 PROCEDURE — 3288F FALL RISK ASSESSMENT DOCD: CPT | Mod: HCNC,CPTII,S$GLB, | Performed by: FAMILY MEDICINE

## 2025-04-04 PROCEDURE — 3060F POS MICROALBUMINURIA REV: CPT | Mod: HCNC,CPTII,S$GLB, | Performed by: FAMILY MEDICINE

## 2025-04-04 PROCEDURE — 1101F PT FALLS ASSESS-DOCD LE1/YR: CPT | Mod: HCNC,CPTII,S$GLB, | Performed by: FAMILY MEDICINE

## 2025-04-04 PROCEDURE — 99214 OFFICE O/P EST MOD 30 MIN: CPT | Mod: HCNC,S$GLB,, | Performed by: FAMILY MEDICINE

## 2025-04-04 RX ORDER — ZOSTER VACCINE RECOMBINANT, ADJUVANTED 50 MCG/0.5
0.5 KIT INTRAMUSCULAR ONCE
Qty: 1 EACH | Refills: 0 | Status: SHIPPED | OUTPATIENT
Start: 2025-04-04 | End: 2025-04-04

## 2025-04-04 RX ORDER — METFORMIN HYDROCHLORIDE 500 MG/1
500 TABLET, EXTENDED RELEASE ORAL
Qty: 90 TABLET | Refills: 3 | Status: SHIPPED | OUTPATIENT
Start: 2025-04-04 | End: 2026-04-04

## 2025-04-04 NOTE — PROGRESS NOTES
Assessment & Plan  Problem List Items Addressed This Visit          Cardiac/Vascular    Essential hypertension (Chronic)    Relevant Orders    Comprehensive Metabolic Panel    CBC Auto Differential    Hemoglobin A1C    Lipid Panel    TSH       Endocrine    Type 2 diabetes mellitus with diabetic cataract, without long-term current use of insulin - Primary    Relevant Medications    metFORMIN (GLUCOPHAGE-XR) 500 MG ER 24hr tablet    Other Relevant Orders    Comprehensive Metabolic Panel    CBC Auto Differential    Hemoglobin A1C    Lipid Panel    TSH     Other Visit Diagnoses         Controlled type 2 diabetes mellitus without complication, without long-term current use of insulin        Relevant Medications    metFORMIN (GLUCOPHAGE-XR) 500 MG ER 24hr tablet    Other Relevant Orders    Comprehensive Metabolic Panel    CBC Auto Differential    Hemoglobin A1C    Lipid Panel    TSH      Intractable episodic tension-type headache          Pain of right hand               Assessment & Plan  1. Diabetes mellitus.  He reported that his blood sugar levels had previously spiked to around 300 but are now controlled between 135 and 165 with metformin. He expressed concerns about the cost of insulin and mentioned previous use of Trulicity. A hemoglobin A1c test will be ordered to assess his current glycemic control. Depending on the results, adjustments to his medication regimen, including the potential addition of an affordable insulin, will be considered.    2. Hand pain.  He reported chronic hand pain and numbness, which worsens when gripping objects. He mentioned a previous fracture of the third digit and ongoing issues affecting his ability to use his hand. He is currently using a brace and [facility] before pursuing additional treatment options.    3. Headaches.  He reported daily headaches that are now more frequent and steady compared to previous intermittent episodes. The headaches  are described as being in the center of his head, sometimes requiring him to sit down. He has not tried over-the-counter medications like Advil or Tylenol for relief. Further evaluation and potential treatment options will be considered based on his symptoms and preferences.    4. Health maintenance.  A colonoscopy will be ordered as part of his routine health maintenance. He will also receive the shingles vaccine today, given his history of chickenpox and the increased risk of shingles with age.    5. Sleep issues.  He is currently on medication for sleep, which he reports as effective.    Results        Health Maintenance reviewed.      ______________________________________________________________________    Chief Complaint  Chief Complaint   Patient presents with    Headache    Diabetes       HPI  Eyad Rodriguez is a 69 y.o. male with multiple medical diagnoses as listed in the medical history and problem list that presents for headache/diabetes.  Pt is known to me with last appointment 8/20/2024 .    History of Present Illness  The patient presents for a checkup.    He expresses concern regarding his diabetes, which was previously uncontrolled with blood glucose levels peaking at 300. Currently, his blood glucose levels range between 135 and 165. He is contemplating resuming insulin therapy due to the high cost of Trulicity. He has been managing his diabetes with metformin, taking half a tablet twice daily. Approximately a year ago, he experienced a severe hyperglycemic episode with blood glucose levels reaching 700, necessitating hospitalization and intravenous treatment. Post-discharge, he was prescribed Trulicity but discontinued it due to its high cost. He also reports experiencing visual disturbances, including blindness, following a decrease in his blood glucose levels. Despite using corrective lenses, his vision remains impaired, particularly during periods of hyperglycemia. He also reports urinary  incontinence, which he attributes to bladder swelling or pancreatic issues. He reports no recent falls. He has been adhering to a sugar-free diet and has not added any sugar to his diet.    He has been experiencing persistent hand pain, which he attributes to a previous  service-related fracture of the third digit. The pain has since spread to other digits, significantly impairing his  strength. He also reports constant numbness in the affected hand. He has been using a brace for support but did not bring it to today's appointment. He has not sought radiographic evaluation or pharmacological intervention for his hand pain. He prefers a conservative approach to his hand pain management and is [facility].    He reports daily headaches, a new symptom that started around December. The headaches are localized in the center of his head and are not associated with photophobia, phonophobia, nausea, or dizziness. He also reports neck soreness during headache episodes. He has not tried over-the-counter analgesics for his headaches.    He is currently on medication for sleep, which he reports as effective.    He is interested in getting a colonoscopy done. He had chickenpox when he was 69-year-old.    MEDICATIONS  Current: Metformin.  Past: Trulicity.           PAST MEDICAL HISTORY:  Past Medical History:   Diagnosis Date    Angina pectoris     Anxiety     BPH (benign prostatic hypertrophy)     Chronic low back pain     followed by pain management    Disorder of kidney and ureter     DJD (degenerative joint disease) of knee     Heart failure     Hemorrhoid     HTN (hypertension)     Insomnia     Obesity     Personal history of kidney stones     Tobacco abuse     Type 2 diabetes mellitus        PAST SURGICAL HISTORY:  Past Surgical History:   Procedure Laterality Date    cyst removed from back      TOTAL KNEE ARTHROPLASTY Left 4/5/2023    Procedure: ARTHROPLASTY, KNEE, TOTAL. KARISSA   Surgeon: Shane Arreola MD;  Location: [facility];  Service: Orthopedics;  Laterality: Left;  Same Day  JENNI DE LA CRUZ [phone] TEXTED DOROTHY ON @ 2/28/23 10:34AM HE REPLIED ON 2/28/23 @ 10:34AM -[facility]  1ST ASSIST MUKUL [phone] TEXTED MUKUL ON 2/28/23 @ 10:37AM. SHE REPLIED ON 2/28/23 @ 10:37AM -[facility]  RN PREOP 03/22/2023, TYPE & SCREEN, --       SOCIAL HISTORY:  Social History[1]    FAMILY HISTORY:  Family History   Problem Relation Name Age of Onset    Hypertension Mother      Anxiety disorder Mother      Diverticulosis Mother      Irritable bowel syndrome Mother      Cataracts Mother      Hypertension Father      Anxiety disorder Father      Anxiety disorder Sister      Colon polyps Brother Lambert     Colon cancer Neg Hx      Cirrhosis Neg Hx      Liver cancer Neg Hx      Celiac disease Neg Hx      Crohn's disease Neg Hx      Ulcerative colitis Neg Hx      Esophageal cancer Neg Hx      Stomach cancer Neg Hx      Rectal cancer Neg Hx         ALLERGIES AND MEDICATIONS: updated and reviewed.  Review of patient's allergies indicates:   Allergen Reactions    Cyclobenzaprine Hives and Other (See Comments)     Other reaction(s): Nausea     Current Medications[2]      ROS  Review of Systems   Constitutional:  Negative for activity change, appetite change, fatigue, fever and unexpected weight change.   HENT:  Negative for congestion and facial swelling.    Eyes:  Negative for visual disturbance.   Respiratory:  Negative for chest tightness, shortness of breath, wheezing and stridor.    Cardiovascular:  Negative for chest pain, palpitations and leg swelling.   Gastrointestinal:  Negative for abdominal distention, abdominal pain, constipation, diarrhea, nausea and vomiting.   Endocrine: Negative for cold intolerance, heat intolerance, polydipsia and polyuria.   Musculoskeletal:  Positive for arthralgias and myalgias.        Hand pain   Skin: Negative.    Allergic/Immunologic: Negative.    Neurological:  Positive for  "headaches. Negative for dizziness, light-headedness and numbness.   Psychiatric/Behavioral:  Negative for agitation and decreased concentration.            Physical Exam  Vitals:    04/04/25 0858   BP: 132/70   Pulse: 63   SpO2: 95%   Weight: 109.4 kg (241 lb 2.9 oz)   Height: 5' 9" (1.753 m)    Body mass index is 35.62 kg/m².  Weight: 109.4 kg (241 lb 2.9 oz)   Height: 5' 9" (175.3 cm)   Physical Exam  Vitals reviewed.   Constitutional:       Appearance: Normal appearance. He is well-developed.   HENT:      Head: Normocephalic and atraumatic.      Right Ear: External ear normal.      Left Ear: External ear normal.      Nose: Nose normal.   Eyes:      Extraocular Movements: Extraocular movements intact.      Conjunctiva/sclera: Conjunctivae normal.      Pupils: Pupils are equal, round, and reactive to light.   Cardiovascular:      Rate and Rhythm: Normal rate and regular rhythm.      Heart sounds: Normal heart sounds.   Pulmonary:      Effort: Pulmonary effort is normal.      Breath sounds: Normal breath sounds.   Musculoskeletal:      Cervical back: Normal range of motion.   Skin:     General: Skin is warm and dry.   Neurological:      Mental Status: He is alert and oriented to person, place, and time.   Psychiatric:         Behavior: Behavior normal.        Physical Exam  Lungs are clear.  Heart sounds are normal.    Vital Signs  Blood pressure is 132/70.         Health Maintenance         Date Due Completion Date    Colorectal Cancer Screening Never done ---    Shingles Vaccine (1 of 2) Never done ---    RSV Vaccine (Age 60+ and Pregnant patients) (1 - Risk 60-74 years 1-dose series) Never done ---    Foot Exam 05/21/2021 5/21/2020    Abdominal Aortic Aneurysm Screening 05/18/2022 7/15/2015    Diabetic Eye Exam 04/18/2024 4/18/2023    COVID-19 Vaccine (4 - 2024-25 season) 09/01/2024 11/23/2022    Lipid Panel 12/11/2024 12/11/2023    Hemoglobin A1c 02/20/2025 8/20/2024    Diabetes Urine Screening 05/21/2025 " (Originally 2/28/2023) 2/28/2022    High Dose Statin 04/04/2026 4/4/2025    TETANUS VACCINE 09/25/2029 9/25/2019                Patient note was created using [employer].  Any errors in syntax or even information may not have been identified and edited on initial review prior to signing this note.         [1]   Social History  Socioeconomic History    Marital status:     Number of children: 1   Occupational History    Occupation:    Tobacco Use    Smoking status: Former     Types: Cigars     Passive exposure: Past    Smokeless tobacco: Current    Tobacco comments:     quit 10 yrs ago   Substance and Sexual Activity    Alcohol use: No     Alcohol/week: 1.7 standard drinks of alcohol     Types: 2 Standard drinks or equivalent per week    Drug use: No    Sexual activity: Not Currently   [2]   Current Outpatient Medications   Medication Sig Dispense Refill    acetaminophen (TYLENOL) 500 MG tablet Take 2 tablets (1,000 mg total) by mouth every 8 (eight) hours as needed for Pain. 42 tablet 0    ALPRAZolam (XANAX) 0.5 MG tablet TAKE 1 TABLET BY MOUTH THREE TIMES A DAY AS NEEDED FOR anxiety 90 tablet 3    amLODIPine (NORVASC) 10 MG tablet TAKE 1 TABLET BY MOUTH ONCE A DAY FOR high blood pressure 90 tablet 1    ceFAZolin 2 g/50mL Dextrose IVPB (ANCEF) 2 gram/50 mL PgBk       lisinopriL (PRINIVIL,ZESTRIL) 40 MG tablet Take 1 tablet (40 mg total) by mouth once daily. 90 tablet 1    metoprolol succinate (TOPROL-XL) 25 MG 24 hr tablet TAKE 1 TABLET BY MOUTH ONCE A DAY FOR blood pressure 90 tablet 1    naproxen (NAPROSYN) 500 MG tablet TAKE 1 TABLET BY MOUTH TWICE A DAY with meals FOR pain 60 tablet 2    rosuvastatin (CRESTOR) 5 MG tablet Take 1 tablet (5 mg total) by mouth once daily. 90 tablet 3    TRUE METRIX AIR GLUCOSE METER Misc use to test sugars THREE TIMES A DAY      TRUE METRIX GLUCOSE TEST STRIP Strp use to test blood sugars THREE TIMES A  strip 0    TRUEPLUS LANCETS 30 gauge Misc use to test  sugars THREE TIMES A  each 2    zolpidem (AMBIEN) 10 mg Tab Take ONE tablet BY MOUTH EVERY EVENING 30 tablet 0    alprostadil (MUSE) 250 MCG pellet 1 each (250 mcg total) by Transurethral route as needed for Erectile Dysfunction. use no more than 3 times per week 6 each 1    aspirin (ECOTRIN) 81 MG EC tablet Take 1 tablet (81 mg total) by mouth 2 (two) times a day. 60 tablet 0    metFORMIN (GLUCOPHAGE-XR) 500 MG ER 24hr tablet Take 1 tablet (500 mg total) by mouth daily with breakfast. 90 tablet 3     No current facility-administered medications for this visit.

## 2025-04-23 DIAGNOSIS — F51.02 TRANSIENT INSOMNIA: ICD-10-CM

## 2025-04-23 NOTE — TELEPHONE ENCOUNTER
No care due was identified.  [facility] Embedded Care Due Messages. Reference number: [id number].   4/23/2025 9:43:23 AM CDT

## 2025-04-29 DIAGNOSIS — F41.9 ANXIETY: ICD-10-CM

## 2025-04-29 NOTE — TELEPHONE ENCOUNTER
No care due was identified.  [facility] Embedded Care Due Messages. Reference number: [id number].   4/29/2025 3:52:14 PM CDT

## 2025-04-29 NOTE — TELEPHONE ENCOUNTER
----- Message from Amira sent at 4/29/2025  1:49 PM CDT -----  .Type: RX Refill RequestWho Called: SelfHave you contacted your pharmacy: Yes Refill or New Rx:Refill RX Name and Strength:ALPRAZolam (XANAX) 0.5 MG tabletzolpidem (AMBIEN) 10 mg TabPreferred Pharmacy with phone number:.[facility] [place] [id number]  [id number] [facility] [address] [address]: [phone] Fax: [phone] or Mail Order: LocalOrdering Provider: Nafisa Parnell the patient rather a call back or a response via [facility]? Call Back Best Call Back Number: .[phone] (home) Additional Information:

## 2025-04-30 RX ORDER — ALPRAZOLAM 0.5 MG/1
TABLET ORAL
Qty: 90 TABLET | Refills: 3 | Status: SHIPPED | OUTPATIENT
Start: 2025-04-30

## 2025-05-01 RX ORDER — ZOLPIDEM TARTRATE 10 MG/1
10 TABLET ORAL NIGHTLY
Qty: 30 TABLET | Refills: 0 | Status: SHIPPED | OUTPATIENT
Start: 2025-05-01

## 2025-05-28 DIAGNOSIS — F51.02 TRANSIENT INSOMNIA: ICD-10-CM

## 2025-05-28 NOTE — TELEPHONE ENCOUNTER
No care due was identified.  [facility] Embedded Care Due Messages. Reference number: [id number].   5/28/2025 5:20:23 PM CDT

## 2025-05-29 RX ORDER — ZOLPIDEM TARTRATE 10 MG/1
10 TABLET ORAL NIGHTLY
Qty: 30 TABLET | Refills: 0 | Status: SHIPPED | OUTPATIENT
Start: 2025-05-29

## 2025-06-25 ENCOUNTER — CLINICAL SUPPORT (OUTPATIENT)
Dept: ENDOSCOPY | Facility: HOSPITAL | Age: 70
End: 2025-06-25
Attending: FAMILY MEDICINE
Payer: MEDICARE

## 2025-06-25 ENCOUNTER — TELEPHONE (OUTPATIENT)
Dept: ENDOSCOPY | Facility: HOSPITAL | Age: 70
End: 2025-06-25

## 2025-06-25 VITALS — WEIGHT: 241 LBS | HEIGHT: 69 IN | BODY MASS INDEX: 35.7 KG/M2

## 2025-06-25 DIAGNOSIS — Z12.13 ENCOUNTER FOR SCREENING FOR MALIGNANT NEOPLASM OF SMALL INTESTINE: ICD-10-CM

## 2025-06-25 DIAGNOSIS — Z12.11 SPECIAL SCREENING FOR MALIGNANT NEOPLASMS, COLON: Primary | ICD-10-CM

## 2025-06-25 NOTE — PATIENT INSTRUCTIONS
Colonoscopy Procedure Prep Instructions    Date of procedure: 08/14/2025 Arrive at: 12:10 PM    Location of Department:   [facility] [place] [address]  Take the Elevators to 2nd Floor Endoscopy Procedural Area    As soon as possible:   your prep from pharmacy and over the counter DULCOLAX LAXATIVE TABLETS            On the day before your procedure   What You CAN do:   You may have clear liquids ONLY-see below for list.     Liquids That Are OK to Drink:   Water  Sports drinks (Gatorade, Power-Aid)  Coffee or tea (no cream or nondairy creamer)  Clear juices without pulp (apple, white grape)  Gelatin desserts (no fruit or toppings)  Clear soda (sprite, coke, ginger ale)  Chicken broth (until 12 midnight the night before procedure)    What You CANNOT do:   Do not EAT solid food, drink milk or anything   colored red.  Do not drink alcohol.  Do not take oral medications within 1 hour of starting   each dose of prep.  No gum chewing or candy morning of procedure                       Note:   (Please disregard the insert instructions from pharmacy).  PEG Bowel Prep is indicated for cleansing of the colon as a preparation for colonoscopy in adults.   Be sure to tell your doctor about all the medicines you take, including prescription and non-prescription medicines, vitamins, and herbal supplements. PEG Bowel Prep may affect how other medicines work.  Medication taken by mouth may not be absorbed properly when taken within 1 hour before the start of each dose of PEG Bowel Prep.    It is not uncommon to experience some abdominal cramping, nausea and/or vomiting when taking the prep. If you have nausea and/or vomiting while taking the prep, stop drinking for 20 to 30 minutes then continue.      How to take prep:    PEG Bowel Prep is a (2-day) prep.    One (1) bottle of prep are required for a complete preparation for colonoscopy. Dilute the solution concentrate as directed prior  to use. You must drink water with each dose of prep, and additional water after each dose.    DOSE 1--Day Before Colonoscopy 08/13/2025     Drink at least 6 to 8 glasses of clear liquids from time you wake up until you begin your prep and then continue until bedtime to avoid dehydration.     12:00 pm (NOON) Mix your entire container of prep with lukewarm water and refrigerate. Take four (4) Dulcolax (Bisacodyl) tablets with at least 8 ounces or more of clear liquids.       6:00 pm:    You must complete Steps 1 and 2 below before going to bed:    Step 1-Drink half the liquid in the container within one (1) hour.   Step 2-Refrigerate the remaining half of the liquid for dose 2. See below when to begin this step.                       IMPORTANT: If you experience preparation-related symptoms (for example, nausea, bloating, or cramping), stop, or slow the rate of drinking the additional water until your symptoms decrease.    DOSE 2--Day of the Colonoscopy 08/14/2025 at 2-3 AM.    For this dose, repeat Step 1 shown above using the remaining half of the liquid prep.   You may continue drinking water/clear liquids until   4 hours before your colonoscopy or as directed by the scheduling nurse  09:10 AM.      For information about your procedure, two (2) things to view prior to colonoscopy:  Please watch this informational video. It is important to watch this animated consent video prior to your arrival. If you haven't watched the video prior to arriving, you are required to watch it during admission which can causes delays.    Options for viewing:   Using a keyboard:  press and hold the control tab (Ctrl) and left mouse click to follow links.           Colonoscopy Instructional Video                                                                                   OR    Type link address into your web browser's address bar:  [email or web address]      Educational Booklet with  pictures:      Colonoscopy Prep - Liquid      Comments:        IMPORTANT INFORMATION TO KNOW BEFORE YOUR PROCEDURE    [facility] 2nd Floor       When you arrive:  If your procedure is Monday - Friday 5am - 7pm - Please enter through the front door near [facility]. Please proceed up the first set of elevators to the 2nd floor where you will check in at the endo registration desk.     If your procedure is on a Saturday (weekend), enter through the back Outpatient entrance. Please note this entrance is diagonal from the Emergency Department entrance.              If your procedure requires the administration of anesthesia, it is necessary for a responsible adult to drive you home. (Medical Transportation, Uber, Lyft, Taxi, etc. may ONLY be used if a responsible adult is present to accompany you home.  The responsible adult CAN'T be the  of the service).      person must be available to return to pick you up within 15 minutes of being notified of discharge.       Please bring a picture ID, insurance card, & copayment      Take Medications as directed below:        If you begin taking any blood thinning medications, injectable weight loss/diabetes medications (other than insulin) , Adipex (Phentermine) , please contact the endoscopy scheduling department listed below as soon as possible.    If you are diabetic see the attached instruction sheet regarding your medication.     If you take HEART, BLOOD PRESSURE, SEIZURE, PAIN, LUNG (including inhalers/nebulizers), ANTI-REJECTION (transplant patients), or PSYCHIATRIC medications, please take at your regular times with a sip of water or as directed by the scheduling nurse.     Important contact information:    Endoscopy [phone] Hours of operation Monday-Friday 8:00-4:30pm.    Questions about insurance or financial obligations call [phone] or [phone].    If you have questions regarding the prep or need  to reschedule, please call [phone]. After hours questions requiring immediate assistance, contact [facility] nurse line at [phone] or [phone].   NOTE:     On occasion, unforeseen circumstances may cause a delay in your procedure start time. We respect your time and appreciate your patience during these circumstances.      Comments:       If you are unsure about any of these instructions, call [facility] at [phone].                                 Oral Medicine Week of Procedure Day of Prep   Day of Procedure            Glyburide       Do NOT take morning of procedure. If you        Glucotrol (Glipizide)   Do NOT take.   take twice daily, take with dinner.        Amaryl (Glimepiride)                                     Glucophage       Do NOT take morning of procedure. Take        Glumetza   Take as   when you start eating again.          Fortamet (Metformin)   prescribed.                                 Januvia (Sitaglipitin)       Do NOT take morning of procedure. Take        Nesina (Aloglipitin)       when you start eating again.          Onglyza (Saxaglipitin)   Take as                  Tradjenta (Linaglipitin)   prescribed.                                 Invokana (Canagliflozin)                      Farxiga (Dapagliflozin)       Do NOT take morning of procedure. Take        Jardiance(Empagliflozin) STOP 2 days before you start prep Do NOT take   when you start eating again.                         Actos (Pioglitazone)   Take as   Do NOT take morning of procedure. Take            prescribed.   when you start eating again.                          Injectable & Combination   Daily Dose   Weekly Dose            Medicine                      Adlyxin (Lixisenatide)   If you take these   For weekly dose, hold dose at least 8 days prior      Byetta (Exenatide)   medications daily   to appointment. You may take the dose after your      Bydureon (Exenatide XL)   on the day of your    procedure.            Ozempic (Semaglutide)   appointment hold                   Trulicity (Dulaglutide)   that dose until                   Victoza (Liraglutide)   after your                  Mounjaro (Tirzepatide)   procedure.                  Sy Holland                      It is important to monitor your blood sugar while doing the bowel preparation. On the day of your bowel prep, when you are on a clear liquid diet, you may drink beverages with sugar as your source of glucose. Be sure to mix the prep with water or sugar free liquid only. Below are instructions on how to adjust your diabetic medications prior to your scheduled procedure. Call the healthcare provider who manages your diabetes if you have questions.      Insulin for Type 1 Diabetes Mellitus   Day of Prep                                          Day of procedure            Basaglar If you use in the morning, take as prescribed.        If you take in the morning,          Lantus If you use in the evening, inject 70% of dose.       inject 80% of dose. If you take         Levemir           in the evenings, inject usual          Toujeo           dose.              Tamara Muse Count carbs and adjust dose        Do NOT take morining of procedure.          Apidra accordingly. If not carb counting,        Take when you start eating again.          Humalog 100 take 25% of usual meal dose.                       Humalog 200 May use correction dose every                        Novolog 4 hours. Do NOT use once sugar-free                         liquid prep is started.                                         Insulin Pump SEE SEPARATE PUMP GUIDANCE                                                                                                                       Insulin for Type 2 Diabetes Mellitus   Day of Prep                                          Day  of procedure            Basaglar If you use in the morning, take as prescribed.        If you take in the morning,          Lantus If you use in the evening, inject 70% of dose.       inject 80% of dose. If you take         Levemir           in the evenings, inject usual          Toujeo           dose.              Tresiba                                                                     Afrezza Inject 50 % of dose with clear liquid diet.        Do NOT take morning of          Apidra Do NOT use once sugar-free clear liquid prep       procedure. Take when you          Fiasp is started. If you are using a correction scale,        start eating meals again.          Humalog 100 take dose every 4 hours as needed.                        Humalog 200                         Novolog                                           NPH  Inject 50% of breakfast and dinner          Do NOT take morning of           doses with clear liquid diet.          procedure. Take usual dose                     when you eat dinner.                            Regular  Inject 50% of breakfast dose and do NOT take       Do NOT take morning of            dinner dose once sugar-free liquid prep has        procedure. Take usual dose            started. If using correction scale, may take dose       when you eat dinner.            every 6 hours as needed.                                          Novolog Mix 70/30 Inject 50% of breakfast dose. Inject 25%       Do NOT take breakfast dose.          Humolog Mix 75/25 of dinner dose.          Take usual dose when you eat          Humolog Mix 50/50           dinner.                                U500 Take 50% of AM or breakfast unit cynthia dose.       Do NOT take mornining of            Take 25% of lunch or dinner or PM unit cynthia dose.        procedure. Take when you                      start eating again.                              V-Go  Continue to wear your V-Go device. Do NOT click        Coninue to  wear your V-Go           once sugar-free clear liquid prep is started.        device. Resume clicks with                      meals.                                                  Revised 3.4.24

## 2025-06-25 NOTE — PLAN OF CARE
Patient is scheduled for a Colonoscopy on 08/14/2025 with Dr. BEENA Herrera  Referral for procedure from PAT appointment

## 2025-06-27 DIAGNOSIS — I10 ESSENTIAL HYPERTENSION: ICD-10-CM

## 2025-06-27 RX ORDER — AMLODIPINE BESYLATE 10 MG/1
TABLET ORAL
Qty: 90 TABLET | Refills: 3 | Status: SHIPPED | OUTPATIENT
Start: 2025-06-27

## 2025-06-27 NOTE — TELEPHONE ENCOUNTER
Refill Routing Note   Medication(s) are not appropriate for processing by [facility] for the following reason(s):        Drug-disease interaction    [facility] action(s):  Defer  Approve        Medication Therapy Plan: Drug-Disease: metoprolol succinate and Bradycardia      Appointments  past 12m or future 3m with PCP    Date Provider   Last Visit   4/4/2025 Nafisa Garcia MD   Next Visit   7/14/2025 Nafisa Garcia MD   ED visits in past 90 days: 0        Note composed:6:46 PM 06/27/2025

## 2025-06-27 NOTE — TELEPHONE ENCOUNTER
Unable to retrieve patient chart and identify care due.  [facility] Embedded Care Due Messages. Reference number: [id number].   6/27/2025 9:25:17 AM CDT

## 2025-06-30 RX ORDER — METOPROLOL SUCCINATE 25 MG/1
25 TABLET, EXTENDED RELEASE ORAL
Qty: 90 TABLET | Refills: 3 | Status: SHIPPED | OUTPATIENT
Start: 2025-06-30

## 2025-07-08 ENCOUNTER — PATIENT OUTREACH (OUTPATIENT)
Dept: ADMINISTRATIVE | Facility: HOSPITAL | Age: 70
End: 2025-07-08
Payer: MEDICARE

## 2025-07-11 ENCOUNTER — TELEPHONE (OUTPATIENT)
Dept: ENDOSCOPY | Facility: HOSPITAL | Age: 70
End: 2025-07-11
Payer: MEDICARE

## 2025-07-14 ENCOUNTER — OFFICE VISIT (OUTPATIENT)
Dept: FAMILY MEDICINE | Facility: CLINIC | Age: 70
End: 2025-07-14
Payer: MEDICARE

## 2025-07-14 VITALS
SYSTOLIC BLOOD PRESSURE: 130 MMHG | OXYGEN SATURATION: 93 % | BODY MASS INDEX: 36.9 KG/M2 | HEART RATE: 69 BPM | WEIGHT: 249.13 LBS | TEMPERATURE: 98 F | HEIGHT: 69 IN | DIASTOLIC BLOOD PRESSURE: 70 MMHG

## 2025-07-14 DIAGNOSIS — E11.36 TYPE 2 DIABETES MELLITUS WITH DIABETIC CATARACT, WITHOUT LONG-TERM CURRENT USE OF INSULIN: ICD-10-CM

## 2025-07-14 DIAGNOSIS — F41.9 ANXIETY: ICD-10-CM

## 2025-07-14 DIAGNOSIS — I10 ESSENTIAL HYPERTENSION: Primary | ICD-10-CM

## 2025-07-14 DIAGNOSIS — E11.65 TYPE 2 DIABETES MELLITUS WITH HYPERGLYCEMIA, WITHOUT LONG-TERM CURRENT USE OF INSULIN: ICD-10-CM

## 2025-07-14 DIAGNOSIS — R07.89 OTHER CHEST PAIN: ICD-10-CM

## 2025-07-14 DIAGNOSIS — F51.02 TRANSIENT INSOMNIA: ICD-10-CM

## 2025-07-14 PROCEDURE — 3008F BODY MASS INDEX DOCD: CPT | Mod: CPTII,HCNC,S$GLB, | Performed by: FAMILY MEDICINE

## 2025-07-14 PROCEDURE — 3075F SYST BP GE 130 - 139MM HG: CPT | Mod: CPTII,HCNC,S$GLB, | Performed by: FAMILY MEDICINE

## 2025-07-14 PROCEDURE — 99999 PR PBB SHADOW E&M-EST. PATIENT-LVL IV: CPT | Mod: PBBFAC,HCNC,, | Performed by: FAMILY MEDICINE

## 2025-07-14 PROCEDURE — 99214 OFFICE O/P EST MOD 30 MIN: CPT | Mod: HCNC,S$GLB,, | Performed by: FAMILY MEDICINE

## 2025-07-14 PROCEDURE — 3052F HG A1C>EQUAL 8.0%<EQUAL 9.0%: CPT | Mod: CPTII,HCNC,S$GLB, | Performed by: FAMILY MEDICINE

## 2025-07-14 PROCEDURE — 3066F NEPHROPATHY DOC TX: CPT | Mod: CPTII,HCNC,S$GLB, | Performed by: FAMILY MEDICINE

## 2025-07-14 PROCEDURE — 3060F POS MICROALBUMINURIA REV: CPT | Mod: CPTII,HCNC,S$GLB, | Performed by: FAMILY MEDICINE

## 2025-07-14 PROCEDURE — 1125F AMNT PAIN NOTED PAIN PRSNT: CPT | Mod: CPTII,HCNC,S$GLB, | Performed by: FAMILY MEDICINE

## 2025-07-14 PROCEDURE — 4010F ACE/ARB THERAPY RXD/TAKEN: CPT | Mod: CPTII,HCNC,S$GLB, | Performed by: FAMILY MEDICINE

## 2025-07-14 PROCEDURE — 1160F RVW MEDS BY RX/DR IN RCRD: CPT | Mod: CPTII,HCNC,S$GLB, | Performed by: FAMILY MEDICINE

## 2025-07-14 PROCEDURE — 3078F DIAST BP <80 MM HG: CPT | Mod: CPTII,HCNC,S$GLB, | Performed by: FAMILY MEDICINE

## 2025-07-14 PROCEDURE — 3288F FALL RISK ASSESSMENT DOCD: CPT | Mod: CPTII,HCNC,S$GLB, | Performed by: FAMILY MEDICINE

## 2025-07-14 PROCEDURE — 1101F PT FALLS ASSESS-DOCD LE1/YR: CPT | Mod: CPTII,HCNC,S$GLB, | Performed by: FAMILY MEDICINE

## 2025-07-14 PROCEDURE — 1159F MED LIST DOCD IN RCRD: CPT | Mod: CPTII,HCNC,S$GLB, | Performed by: FAMILY MEDICINE

## 2025-07-14 RX ORDER — METFORMIN HYDROCHLORIDE 500 MG/1
500 TABLET, EXTENDED RELEASE ORAL
Qty: 90 TABLET | Refills: 3 | Status: SHIPPED | OUTPATIENT
Start: 2025-07-14 | End: 2026-07-14

## 2025-07-14 RX ORDER — ALPRAZOLAM 0.5 MG/1
TABLET ORAL
Qty: 90 TABLET | Refills: 3 | Status: SHIPPED | OUTPATIENT
Start: 2025-07-14

## 2025-07-14 RX ORDER — LISINOPRIL 40 MG/1
40 TABLET ORAL DAILY
Qty: 90 TABLET | Refills: 1 | Status: SHIPPED | OUTPATIENT
Start: 2025-07-14

## 2025-07-14 RX ORDER — METOPROLOL SUCCINATE 25 MG/1
25 TABLET, EXTENDED RELEASE ORAL DAILY
Qty: 90 TABLET | Refills: 3 | Status: SHIPPED | OUTPATIENT
Start: 2025-07-14

## 2025-07-14 RX ORDER — AMLODIPINE BESYLATE 10 MG/1
10 TABLET ORAL DAILY
Qty: 90 TABLET | Refills: 3 | Status: SHIPPED | OUTPATIENT
Start: 2025-07-14

## 2025-07-14 RX ORDER — ZOLPIDEM TARTRATE 10 MG/1
10 TABLET ORAL NIGHTLY
Qty: 30 TABLET | Refills: 3 | Status: SHIPPED | OUTPATIENT
Start: 2025-07-14

## 2025-07-14 NOTE — PROGRESS NOTES
Routine Office Visit     Patient Name: Eyad Rodriguez    : 1955  MRN: [id number]      Assessment     Assessment & Plan    E11.36 Type 2 diabetes mellitus with diabetic cataract, without long-term current use of insulin  E11.65 Type 2 diabetes mellitus with hyperglycemia, without long-term current use of insulin  I10 Essential hypertension  F41.9 Anxiety  F51.02 Transient insomnia  R07.89 Other chest pain    IMPRESSION:  - Concerned about chest discomfort and numbness; considering cardiac evaluation.  - Suggested stress test to rule out cardiac issues, given diabetes and potential for atypical symptoms.    PLAN SUMMARY:  - Referred to Dr. Gomez for stress test and cardiac evaluation  - Enrolled in digital medicine program for remote BP and blood sugar monitoring  - Recommend saline nasal sprays and neti pots for nasal congestion    TYPE 2 DIABETES MELLITUS WITH DIABETIC CATARACT, WITHOUT LONG-TERM CURRENT USE OF INSULIN:  - Explained that diabetes can cause atypical heart symptoms, emphasizing the importance of cardiac evaluation.    CONTROLLED TYPE 2 DIABETES MELLITUS WITH HYPERGLYCEMIA, WITHOUT LONG-TERM CURRENT USE OF INSULIN:  - Recommend digital medicine program to monitor BP and glucose levels remotely; enrolled in program for remote monitoring of BP and blood sugar.    ESSENTIAL HYPERTENSION:  - Will monitor blood pressure through the digital medicine program as noted above.    ANXIETY:  - No specific interventions at this time.    TRANSIENT INSOMNIA:  - No specific interventions at this time.    OTHER CHEST PAIN:  - Referred to Dr. Gomez for stress test to evaluate cardiac function and cardiac evaluation.    LIFESTYLE CHANGES:  - Discussed natural methods for clearing nasal congestion, including saline nasal sprays and neti pots.         Problem List Items Addressed This Visit          Psychiatric    Anxiety    Relevant Medications    ALPRAZolam (XANAX) 0.5 MG tablet       Cardiac/Vascular     Essential hypertension - Primary (Chronic)    Relevant Medications    amLODIPine (NORVASC) 10 MG tablet    lisinopriL (PRINIVIL,ZESTRIL) 40 MG tablet    metoprolol succinate (TOPROL-XL) 25 MG 24 hr tablet    Other Relevant Orders    Comprehensive Metabolic Panel    CBC Auto Differential    Hemoglobin A1C    Lipid Panel    TSH    NURSING COMMUNICATION: [facility]    Hypertension Digital Medicine (HDMP) Enrollment Order (Completed)       Endocrine    Type 2 diabetes mellitus with diabetic cataract, without long-term current use of insulin    Relevant Medications    metFORMIN (GLUCOPHAGE-XR) 500 MG ER 24hr tablet    Other Relevant Orders    Comprehensive Metabolic Panel    CBC Auto Differential    Hemoglobin A1C    Lipid Panel    TSH    Create [facility]    Diabetes Digital Medicine (DDMP) Enrollment Order (Completed)    Type 2 diabetes mellitus with hyperglycemia, without long-term current use of insulin    Relevant Medications    metFORMIN (GLUCOPHAGE-XR) 500 MG ER 24hr tablet     Other Visit Diagnoses         Transient insomnia        Relevant Medications    zolpidem (AMBIEN) 10 mg Tab      Other chest pain        Relevant Orders    Ambulatory referral/consult to Cardiology            Health Maintenance reviewed.    ______________________________________________________________________    Chief Complaint  Chief Complaint   Patient presents with    Diabetes       Eyad Rodriguez is a 70 y.o. male with multiple medical diagnoses as listed in the medical history and problem list that presents for diabetes.  Pt is known to me with last appointment 4/4/2025 .        Subjective     History of Present Illness    CHIEF COMPLAINT:  Patient presents today for follow up of multiple medical concerns including migraines, neck pain, and diabetes management.    DIABETES:  He reports persistently elevated glucose levels over the past three months, with readings reaching as high as 300. He checks his blood  sugar 3 times daily. He is currently taking Metformin but experiencing significant GI side effects causing discomfort. He believes his diabetes may be related to potential Agent Orange exposure during  service. Diet review reveals recent meals including breakfast of steak with bell peppers, mushrooms, onions, grits, and half a biscuit; lunch typically consisting of sandwiches; and dinner of crawfish pasta with three strips of fried fish. He acknowledges awareness that certain foods raise his sugar levels. He expresses concern about managing his diabetes and is interested in exploring alternative medication options to mitigate current Metformin side effects. He is open to potential insulin therapy.    MIGRAINES:  He reports ongoing migraine headaches that appear to be associated with his neck pain. He describes migraines as intermittent, potentially linked to his PTSD condition. He denies any changes in migraine pattern or intensity since last visit.    HERNIA:  He reports experiencing pain from an abdominal hernia that radiates to the kidney area with significant intensity. The pain increases with movement and limits his physical activity due to fear of exacerbating symptoms.    NEUROLOGIC:  He reports left-sided numbness affecting the chest and arm, described as constant numbness and tingling involving approximately half of his chest and extending down his arm. He denies associated chest pain or discomfort with walking. He expresses uncertainty about the origin of symptoms, wondering if the issue stems from neck problems or vertebral issues.    GASTROINTESTINAL:  He reports a history of hemorrhoids with significant bleeding, describing an episode where the toilet was notably discolored with blood. He has a colonoscopy scheduled for August. [facility], which was prompted by bleeding symptoms.    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 He had an eye exam last year during which a comprehensive evaluation was performed, including posterior segment exam.    EXERCISE AND LIFESTYLE:  He engages in yard work approximately 3 times per week, including lawn mowing and weed eating, which he describes as physically challenging. He has a swimming pool in his backyard and utilizes it for exercise and recreation.    SUBSTANCE USE:  He currently holds a medical marijuana prescription. He denies current cigarette and alcohol use. He has a history of cigar use but is not currently smoking.      ROS:  General: -fever, -chills, -fatigue, -weight gain, -weight loss  Eyes: -vision changes, -redness, -discharge, +spots, specks or flashing lights  ENT: -ear pain, +nasal congestion, -sore throat  Cardiovascular: -chest pain, -palpitations, -lower extremity edema  Respiratory: -cough, -shortness of breath  Gastrointestinal: -abdominal pain, -nausea, -vomiting, -diarrhea, -constipation, -blood in stool, +hernias, +rectal bleeding, +bright red blood per rectum  Genitourinary: -dysuria, -hematuria, -frequency  Musculoskeletal: -joint pain, -muscle pain, +neck pain, +limb pain  Skin: -rash, -lesion  Neurological: -headache, -dizziness, +numbness, -tingling, +migraines, +memory loss  Psychiatric: -anxiety, -depression, -sleep difficulty           Objective     /70   Pulse 69   Temp 98.4 °F (36.9 °C) (Oral)   Ht 5' 9" (1.753 m)   Wt 113 kg (249 lb 1.9 oz)   SpO2 (!) 93%   BMI 36.79 kg/m²   Physical Exam  Vitals reviewed.   Constitutional:       Appearance: Normal appearance. He is well-developed.   HENT:      Head: Normocephalic and atraumatic.      Right Ear: External ear normal.      Left Ear: External ear normal.      Nose: Nose normal.   Eyes:      Extraocular Movements: Extraocular movements intact.      Conjunctiva/sclera: Conjunctivae normal.      Pupils: Pupils are equal, round, and reactive to light.   Cardiovascular: "      Rate and Rhythm: Normal rate and regular rhythm.      Heart sounds: Normal heart sounds.   Pulmonary:      Effort: Pulmonary effort is normal.      Breath sounds: Normal breath sounds.   Musculoskeletal:      Cervical back: Normal range of motion.   Skin:     General: Skin is warm and dry.   Neurological:      Mental Status: He is alert and oriented to person, place, and time.   Psychiatric:         Behavior: Behavior normal.             This note was generated with the assistance of ambient listening technology. Verbal consent was obtained by the patient and accompanying visitor(s) for the recording of patient appointment to facilitate this note. I attest to having reviewed and edited the generated note for accuracy, though some syntax or spelling errors may persist. Please contact the author of this note for any clarification.

## 2025-07-18 PROBLEM — E11.65 TYPE 2 DIABETES MELLITUS WITH HYPERGLYCEMIA, WITHOUT LONG-TERM CURRENT USE OF INSULIN: Status: ACTIVE | Noted: 2025-07-18

## 2025-08-11 ENCOUNTER — TELEPHONE (OUTPATIENT)
Dept: ENDOSCOPY | Facility: HOSPITAL | Age: 70
End: 2025-08-11
Payer: MEDICARE

## 2025-08-13 ENCOUNTER — TELEPHONE (OUTPATIENT)
Dept: ENDOSCOPY | Facility: HOSPITAL | Age: 70
End: 2025-08-13
Payer: MEDICARE

## 2025-08-13 ENCOUNTER — ANESTHESIA EVENT (OUTPATIENT)
Dept: ENDOSCOPY | Facility: HOSPITAL | Age: 70
End: 2025-08-13
Payer: MEDICARE

## 2025-08-13 RX ORDER — LIDOCAINE HYDROCHLORIDE 10 MG/ML
1 INJECTION, SOLUTION EPIDURAL; INFILTRATION; INTRACAUDAL; PERINEURAL ONCE
OUTPATIENT
Start: 2025-08-13 | End: 2025-08-13

## 2025-08-14 ENCOUNTER — HOSPITAL ENCOUNTER (OUTPATIENT)
Facility: HOSPITAL | Age: 70
Discharge: HOME | End: 2025-08-14
Attending: INTERNAL MEDICINE | Admitting: INTERNAL MEDICINE
Payer: MEDICARE

## 2025-08-14 ENCOUNTER — ANESTHESIA (OUTPATIENT)
Dept: ENDOSCOPY | Facility: HOSPITAL | Age: 70
End: 2025-08-14
Payer: MEDICARE

## 2025-08-14 VITALS
RESPIRATION RATE: 18 BRPM | SYSTOLIC BLOOD PRESSURE: 150 MMHG | OXYGEN SATURATION: 95 % | DIASTOLIC BLOOD PRESSURE: 80 MMHG | HEART RATE: 58 BPM | TEMPERATURE: 98 F

## 2025-08-14 DIAGNOSIS — Z12.11 COLON CANCER SCREENING: Primary | ICD-10-CM

## 2025-08-14 DIAGNOSIS — Z12.13 ENCOUNTER FOR SCREENING FOR MALIGNANT NEOPLASM OF SMALL INTESTINE: ICD-10-CM

## 2025-08-14 PROCEDURE — 45385 COLONOSCOPY W/LESION REMOVAL: CPT | Mod: PT,HCNC | Performed by: INTERNAL MEDICINE

## 2025-08-14 PROCEDURE — 45380 COLONOSCOPY AND BIOPSY: CPT | Mod: PT,HCNC,, | Performed by: INTERNAL MEDICINE

## 2025-08-14 PROCEDURE — 63600175 PHARM REV CODE 636 W HCPCS: Mod: HCNC

## 2025-08-14 PROCEDURE — 27201012 HC FORCEPS, HOT/COLD, DISP: Mod: HCNC | Performed by: INTERNAL MEDICINE

## 2025-08-14 PROCEDURE — 25000003 PHARM REV CODE 250: Mod: HCNC

## 2025-08-14 PROCEDURE — 88305 TISSUE EXAM BY PATHOLOGIST: CPT | Mod: TC,HCNC | Performed by: INTERNAL MEDICINE

## 2025-08-14 PROCEDURE — 37000008 HC ANESTHESIA 1ST 15 MINUTES: Mod: HCNC | Performed by: INTERNAL MEDICINE

## 2025-08-14 PROCEDURE — 37000009 HC ANESTHESIA EA ADD 15 MINS: Mod: HCNC | Performed by: INTERNAL MEDICINE

## 2025-08-14 RX ORDER — PROPOFOL 10 MG/ML
VIAL (ML) INTRAVENOUS
Status: DISCONTINUED | OUTPATIENT
Start: 2025-08-14 | End: 2025-08-14

## 2025-08-14 RX ORDER — LIDOCAINE HYDROCHLORIDE 20 MG/ML
INJECTION, SOLUTION EPIDURAL; INFILTRATION; INTRACAUDAL; PERINEURAL
Status: DISCONTINUED
Start: 2025-08-14 | End: 2025-08-14 | Stop reason: HOSPADM

## 2025-08-14 RX ORDER — LIDOCAINE HYDROCHLORIDE 20 MG/ML
INJECTION INTRAVENOUS
Status: DISCONTINUED | OUTPATIENT
Start: 2025-08-14 | End: 2025-08-14

## 2025-08-14 RX ORDER — PROPOFOL 10 MG/ML
VIAL (ML) INTRAVENOUS
Status: DISCONTINUED
Start: 2025-08-14 | End: 2025-08-14 | Stop reason: HOSPADM

## 2025-08-14 RX ORDER — SODIUM CHLORIDE 9 MG/ML
INJECTION, SOLUTION INTRAVENOUS CONTINUOUS
Status: DISCONTINUED | OUTPATIENT
Start: 2025-08-14 | End: 2025-08-14 | Stop reason: HOSPADM

## 2025-08-14 RX ADMIN — PROPOFOL 20 MG: 10 INJECTION, EMULSION INTRAVENOUS at 02:08

## 2025-08-14 RX ADMIN — PROPOFOL 80 MG: 10 INJECTION, EMULSION INTRAVENOUS at 02:08

## 2025-08-14 RX ADMIN — PROPOFOL 40 MG: 10 INJECTION, EMULSION INTRAVENOUS at 02:08

## 2025-08-14 RX ADMIN — LIDOCAINE HYDROCHLORIDE 50 MG: 20 INJECTION, SOLUTION INTRAVENOUS at 02:08

## 2025-08-14 RX ADMIN — SODIUM CHLORIDE: 0.9 INJECTION, SOLUTION INTRAVENOUS at 02:08

## 2025-08-15 LAB — POCT GLUCOSE: 111 MG/DL (ref 70–110)

## 2025-08-19 LAB
ESTROGEN SERPL-MCNC: NORMAL PG/ML
INSULIN SERPL-ACNC: NORMAL U[IU]/ML
LAB AP CLINICAL INFORMATION: NORMAL
LAB AP GROSS DESCRIPTION: NORMAL
LAB AP PERFORMING LOCATION(S): NORMAL
LAB AP REPORT FOOTNOTES: NORMAL

## 2025-08-20 ENCOUNTER — TELEPHONE (OUTPATIENT)
Dept: GASTROENTEROLOGY | Facility: CLINIC | Age: 70
End: 2025-08-20
Payer: MEDICARE

## 2025-08-21 ENCOUNTER — TELEPHONE (OUTPATIENT)
Dept: ENDOSCOPY | Facility: HOSPITAL | Age: 70
End: 2025-08-21
Payer: MEDICARE

## 2025-08-27 ENCOUNTER — TELEPHONE (OUTPATIENT)
Dept: ENDOSCOPY | Facility: HOSPITAL | Age: 70
End: 2025-08-27
Payer: MEDICARE

## 2025-09-03 ENCOUNTER — TELEPHONE (OUTPATIENT)
Dept: ENDOSCOPY | Facility: HOSPITAL | Age: 70
End: 2025-09-03
Payer: MEDICARE

## 2025-09-03 VITALS — BODY MASS INDEX: 33.6 KG/M2 | HEIGHT: 71 IN | WEIGHT: 240 LBS

## 2025-09-03 DIAGNOSIS — Z12.11 COLON CANCER SCREENING: Primary | ICD-10-CM

## 2025-09-03 DIAGNOSIS — Z80.0 ENCOUNTER FOR COLONOSCOPY IN PATIENT WITH FAMILY HISTORY OF COLON CANCER: ICD-10-CM

## 2025-09-03 DIAGNOSIS — Z12.11 ENCOUNTER FOR COLONOSCOPY IN PATIENT WITH FAMILY HISTORY OF COLON CANCER: ICD-10-CM

## 2025-09-03 RX ORDER — POLYETHYLENE GLYCOL 3350, SODIUM SULFATE ANHYDROUS, SODIUM BICARBONATE, SODIUM CHLORIDE, POTASSIUM CHLORIDE 236; 22.74; 6.74; 5.86; 2.97 G/4L; G/4L; G/4L; G/4L; G/4L
POWDER, FOR SOLUTION ORAL
Qty: 8000 ML | Refills: 0 | Status: SHIPPED | OUTPATIENT
Start: 2025-09-03

## (undated) DEVICE — UNDERGLOVES BIOGEL PI SZ 7 LF

## (undated) DEVICE — BLADE SAW OSC 25.4X90X1.27 ST

## (undated) DEVICE — NDL 18GA X1 1/2 REG BEVEL

## (undated) DEVICE — PULSAVAC ZIMMER

## (undated) DEVICE — SOL NACL IRR 3000ML

## (undated) DEVICE — PAD ICEMAN UNIV WRAP ON REG

## (undated) DEVICE — TOURNIQUET SB QC DP 34X4IN

## (undated) DEVICE — SUT STRATAFIX PDS PLS 45CM

## (undated) DEVICE — TIP YANKAUERS BULB NO VENT

## (undated) DEVICE — SOL IRR SOD CHL .9% POUR

## (undated) DEVICE — SEE MEDLINE ITEM 157150

## (undated) DEVICE — COOLER ICEMAN COLD THERAPY

## (undated) DEVICE — BETADINE OPTHALMIC SOL 5% 30ML

## (undated) DEVICE — Device

## (undated) DEVICE — HOOD FLYTE PEELWY STERISHIELD

## (undated) DEVICE — KIT DRAPE RIO ONE PIECE W/POCK

## (undated) DEVICE — SUT STRATAFIX PDS 2-0 CT-1 9IN

## (undated) DEVICE — COVER OVERHEAD SURG LT BLUE

## (undated) DEVICE — GLOVE SURGICAL LATEX SZ 6.5

## (undated) DEVICE — BANDAGE MATRIX HK LOOP 6IN 5YD

## (undated) DEVICE — DRAPE STERI U-SHAPED 47X51IN

## (undated) DEVICE — NDL SPINAL 20GX3.5 HUB

## (undated) DEVICE — KIT VIZADISC KNEE TRACKING

## (undated) DEVICE — SYS CLSR DERMABOND PRINEO 22CM

## (undated) DEVICE — BANDAGE ACE ELASTIC 6"

## (undated) DEVICE — ELECTRODE REM PLYHSV RETURN 9

## (undated) DEVICE — SUT VICRYL+ 1 CT1 18IN

## (undated) DEVICE — SYR 50CC LL

## (undated) DEVICE — GLOVE SURGICAL LATEX SZ 8

## (undated) DEVICE — SUT VICRYL PLUS 2-0 CT1 18

## (undated) DEVICE — GAUZE SPONGE 4X4 12PLY

## (undated) DEVICE — KIT TRIATHLON CR TIB PREP SZ5

## (undated) DEVICE — TOWEL OR DISP STRL BLUE 4/PK

## (undated) DEVICE — SEE MEDLINE ITEM 157131

## (undated) DEVICE — UNDERGLOVES BIOGEL PI SIZE 8

## (undated) DEVICE — PAD COLD THERAPY KNEE WRAP ON

## (undated) DEVICE — BLANKET UPPER BODY 78.7X29.9IN

## (undated) DEVICE — APPLICATOR CHLORAPREP ORN 26ML